# Patient Record
Sex: FEMALE | Race: WHITE | NOT HISPANIC OR LATINO | Employment: OTHER | URBAN - METROPOLITAN AREA
[De-identification: names, ages, dates, MRNs, and addresses within clinical notes are randomized per-mention and may not be internally consistent; named-entity substitution may affect disease eponyms.]

---

## 2017-01-16 DIAGNOSIS — E03.4 HYPOTHYROIDISM DUE TO ACQUIRED ATROPHY OF THYROID: ICD-10-CM

## 2017-01-16 DIAGNOSIS — F42.9 OCD (OBSESSIVE COMPULSIVE DISORDER): Primary | ICD-10-CM

## 2017-01-16 NOTE — TELEPHONE ENCOUNTER
Clomipramine      Last Written Prescription Date:  11-28-16  Last Fill Quantity: 60,   # refills: 1  Last Office Visit with [facility], [facility] or [facility] prescribing provider: 11-28-16 with Dr. Agarwal  Future Office visit:       Routing refill request to provider for review/approval because:  Drug not on the [facility], [facility] or [facility] refill protocol or controlled substance    Levothyroxine     Last Written Prescription Date: unknow  Last Quantity: 0, # refills: 0  Last Office Visit with [facility], [facility] or [facility] prescribing provider: 11-28-16 with        TSH   Date Value Ref Range Status   11/08/2016 2.21 0.40 - 4.00 mU/L Final       Nina Buck [facility]

## 2017-01-17 RX ORDER — CLOMIPRAMINE HYDROCHLORIDE 50 MG/1
50 CAPSULE ORAL 2 TIMES DAILY
Qty: 60 CAPSULE | Refills: 1 | Status: CANCELLED
Start: 2017-01-17

## 2017-01-17 RX ORDER — LEVOTHYROXINE SODIUM 25 UG/1
75 TABLET ORAL AT BEDTIME
Qty: 30 TABLET | Status: CANCELLED
Start: 2017-01-17

## 2017-01-17 NOTE — TELEPHONE ENCOUNTER
Routing refill request to provider for review/approval because:  Clomipramine not on the [facility] refill protocol and levothyroxine is reported historical  Vamsi Zhao RN

## 2017-01-17 NOTE — TELEPHONE ENCOUNTER
Can you please ask why she takes 3, 25 mcg synthroid instead of 1 75 mcg tablet? It is fine to refill the anafranil and the thyroid med. She needs labs unless she is seeing the psychiatrist and they are doing them. Cbc and cmp. Rima Agarwal M.D.

## 2017-01-18 RX ORDER — CLOMIPRAMINE HYDROCHLORIDE 50 MG/1
50 CAPSULE ORAL 2 TIMES DAILY
Qty: 180 CAPSULE | Refills: 1 | Status: SHIPPED | OUTPATIENT
Start: 2017-01-18 | End: 2017-02-10

## 2017-01-18 RX ORDER — LEVOTHYROXINE SODIUM 75 UG/1
75 TABLET ORAL DAILY
Qty: 90 TABLET | Refills: 1 | Status: SHIPPED | OUTPATIENT
Start: 2017-01-18 | End: 2017-06-01 | Stop reason: DRUGHIGH

## 2017-01-18 NOTE — TELEPHONE ENCOUNTER
"Patient said that she does not need refills for a month. She requests that 90 day be sent to [facility]. Refills ordered to [facility]. She said that she is feeling way better. \"It is like a miracle . I have not needed to take anything for my anxiety.\"  Vamsi Zhao RN    "

## 2017-02-10 DIAGNOSIS — F42.9 OCD (OBSESSIVE COMPULSIVE DISORDER): Primary | ICD-10-CM

## 2017-02-10 NOTE — TELEPHONE ENCOUNTER
Clomipramine 50mg      Last Written Prescription Date:  1/18/17  Last Fill Quantity: 180,   # refills: 1  Last Office Visit with [facility], [facility] or  [facility] prescribing provider: 11/28/16  Future Office visit:       Routing refill request to provider for review/approval because:  Drug not on the [facility], [facility] or  [facility] refill protocol or controlled substance

## 2017-02-13 RX ORDER — CLOMIPRAMINE HYDROCHLORIDE 50 MG/1
50 CAPSULE ORAL 2 TIMES DAILY
Qty: 180 CAPSULE | Refills: 1 | Status: SHIPPED | OUTPATIENT
Start: 2017-02-13 | End: 2017-06-06 | Stop reason: DRUGHIGH

## 2017-04-19 DIAGNOSIS — F42.9 OCD (OBSESSIVE COMPULSIVE DISORDER): ICD-10-CM

## 2017-04-19 NOTE — TELEPHONE ENCOUNTER
CLOMIPRAMINE 50MG CAP        Last Written Prescription Date:  2/13/17  Last Fill Quantity: 180,   # refills: 1  Last Office Visit with [facility] or [facility] prescribing provider: 11/28/16  Future Office visit:       Routing refill request to provider for review/approval because:  Drug not on the [facility], [facility] or  [facility] refill protocol or controlled substance

## 2017-04-19 NOTE — TELEPHONE ENCOUNTER
Please call patient /. I have not seen her in a long time and she needs to be seen regularly while on this medication. Rima Agarwal M.D.

## 2017-05-16 RX ORDER — CLOMIPRAMINE HYDROCHLORIDE 50 MG/1
CAPSULE ORAL
Qty: 180 CAPSULE | OUTPATIENT
Start: 2017-05-16

## 2017-05-30 ENCOUNTER — OFFICE VISIT (OUTPATIENT)
Dept: FAMILY MEDICINE | Facility: CLINIC | Age: 67
End: 2017-05-30
Payer: MEDICARE

## 2017-05-30 VITALS
SYSTOLIC BLOOD PRESSURE: 118 MMHG | DIASTOLIC BLOOD PRESSURE: 80 MMHG | WEIGHT: 174 LBS | HEIGHT: 65 IN | BODY MASS INDEX: 28.99 KG/M2 | HEART RATE: 72 BPM | TEMPERATURE: 97.7 F

## 2017-05-30 DIAGNOSIS — Z51.81 ENCOUNTER FOR THERAPEUTIC DRUG MONITORING: Primary | ICD-10-CM

## 2017-05-30 DIAGNOSIS — E03.4 HYPOTHYROIDISM DUE TO ACQUIRED ATROPHY OF THYROID: ICD-10-CM

## 2017-05-30 DIAGNOSIS — K21.9 GASTROESOPHAGEAL REFLUX DISEASE WITHOUT ESOPHAGITIS: ICD-10-CM

## 2017-05-30 DIAGNOSIS — F33.41 MAJOR DEPRESSIVE DISORDER, RECURRENT EPISODE, IN PARTIAL REMISSION (H): ICD-10-CM

## 2017-05-30 DIAGNOSIS — F42.9 OCD (OBSESSIVE COMPULSIVE DISORDER): ICD-10-CM

## 2017-05-30 LAB
ALBUMIN SERPL-MCNC: 3.5 G/DL (ref 3.4–5)
ALP SERPL-CCNC: 71 U/L (ref 40–150)
ALT SERPL W P-5'-P-CCNC: 29 U/L (ref 0–50)
ANION GAP SERPL CALCULATED.3IONS-SCNC: 6 MMOL/L (ref 3–14)
AST SERPL W P-5'-P-CCNC: 27 U/L (ref 0–45)
BILIRUB SERPL-MCNC: 0.2 MG/DL (ref 0.2–1.3)
BUN SERPL-MCNC: 13 MG/DL (ref 7–30)
CALCIUM SERPL-MCNC: 9 MG/DL (ref 8.5–10.1)
CHLORIDE SERPL-SCNC: 99 MMOL/L (ref 94–109)
CO2 SERPL-SCNC: 30 MMOL/L (ref 20–32)
CREAT SERPL-MCNC: 0.67 MG/DL (ref 0.52–1.04)
GFR SERPL CREATININE-BSD FRML MDRD: 88 ML/MIN/1.7M2
GLUCOSE SERPL-MCNC: 83 MG/DL (ref 70–99)
POTASSIUM SERPL-SCNC: 4 MMOL/L (ref 3.4–5.3)
PROT SERPL-MCNC: 7.2 G/DL (ref 6.8–8.8)
SODIUM SERPL-SCNC: 135 MMOL/L (ref 133–144)
T4 FREE SERPL-MCNC: 0.86 NG/DL (ref 0.76–1.46)
TSH SERPL DL<=0.005 MIU/L-ACNC: 4.16 MU/L (ref 0.4–4)

## 2017-05-30 PROCEDURE — 36415 COLL VENOUS BLD VENIPUNCTURE: CPT | Performed by: FAMILY MEDICINE

## 2017-05-30 PROCEDURE — 84443 ASSAY THYROID STIM HORMONE: CPT | Performed by: FAMILY MEDICINE

## 2017-05-30 PROCEDURE — 99214 OFFICE O/P EST MOD 30 MIN: CPT | Performed by: FAMILY MEDICINE

## 2017-05-30 PROCEDURE — 84439 ASSAY OF FREE THYROXINE: CPT | Performed by: FAMILY MEDICINE

## 2017-05-30 PROCEDURE — 80053 COMPREHEN METABOLIC PANEL: CPT | Performed by: FAMILY MEDICINE

## 2017-05-30 PROCEDURE — 93000 ELECTROCARDIOGRAM COMPLETE: CPT | Performed by: FAMILY MEDICINE

## 2017-05-30 RX ORDER — CLOMIPRAMINE HYDROCHLORIDE 25 MG/1
75 CAPSULE ORAL AT BEDTIME
Qty: 90 CAPSULE | Refills: 1 | Status: SHIPPED | OUTPATIENT
Start: 2017-05-30 | End: 2017-06-06 | Stop reason: ALTCHOICE

## 2017-05-30 NOTE — MR AVS SNAPSHOT
After Visit Summary   5/30/2017    Ellie Lewis    MRN: [id number]           Patient Information     Date Of Birth          1950        Visit Information        Provider Department      5/30/2017 3:00 PM Rima Agarwal MD [facility]        Today's Diagnoses     Encounter for therapeutic drug monitoring    -  1    OCD (obsessive compulsive disorder)        Major depressive disorder, recurrent episode, in partial remission (H)        Hypothyroidism due to acquired atrophy of thyroid        Gastroesophageal reflux disease without esophagitis          Care Instructions    Take the omeprazole daily 30-60 minutes before a meal for 4 weeks. After this take zantac 150 mg 2 times per day for 2-4 weeks then decrease to zantac 150 mg daily   If you are not improving over the next 3-4 weeks please let me know and we can order further testing.  Make the appointment with the psychiatric provider   Try decreasing to 75 mg of the clomipramine            Follow-ups after your visit        Additional Services     MENTAL HEALTH REFERRAL       Your provider has referred you to: [facility]: [facility] - [facility]  [phone]   [email or web address]   *Referral from [facility] Primary Care Provider required - Consultation Model - medication management & future refills will be returned to [facility] PCP upon completion of evaluation  *Please call to schedule an appointment.    All scheduling is subject to the client's specific insurance plan & benefits, provider/location availability, and provider clinical specialities.  Please arrive 15 minutes early for your first appointment and bring your completed paperwork.    Please be aware that coverage of these services is subject to the terms and limitations of your health insurance plan.  Call member services at your health plan with any benefit or coverage questions.          "         Who to contact     Normal or non-critical lab and imaging results will be communicated to you by MyChart, letter or phone within 4 business days after the clinic has received the results. If you do not hear from us within 7 days, please contact the clinic through [facility] or phone. If you have a critical or abnormal lab result, we will notify you by phone as soon as possible.  Submit refill requests through [employer] or call your pharmacy and they will forward the refill request to us. Please allow 3 business days for your refill to be completed.          If you need to speak with a  for additional information , please call: [phone]             Additional Information About Your Visit        [facility] Information     [facility] lets you send messages to your doctor, view your test results, renew your prescriptions, schedule appointments and more. To sign up, go to [email or web address] . Click on \"Log in\" on the left side of the screen, which will take you to the Welcome page. Then click on \"Sign up Now\" on the right side of the page.     You will be asked to enter the access code listed below, as well as some personal information. Please follow the directions to create your username and password.     Your access code is: [email or web address]  Expires: 2017  3:51 PM     Your access code will  in 90 days. If you need help or a new code, please call your [place] clinic or [phone].        Care EveryWhere ID     This is your Care EveryWhere ID. This could be used by other organizations to access your [place] medical records  [id number]        Your Vitals Were     Pulse Temperature Height BMI (Body Mass Index)          72 97.7  F (36.5  C) (Tympanic) 5' 5\" (1.651 m) 28.96 kg/m2         Blood Pressure from Last 3 Encounters:   17 118/80   16 99/64   16 121/73    Weight from Last 3 Encounters:   17 174 lb (78.9 kg)   16 143 lb (64.9 kg)   16 134 lb " (60.8 kg)              We Performed the Following     Comprehensive metabolic panel     EKG 12-lead complete w/read - Clinics     MENTAL HEALTH REFERRAL     TSH with free T4 reflex          Today's Medication Changes          These changes are accurate as of: 5/30/17  3:54 PM.  If you have any questions, ask your nurse or doctor.               Start taking these medicines.        Dose/Directions    omeprazole 20 MG CR capsule   Commonly known as:  priLOSEC   Used for:  Gastroesophageal reflux disease without esophagitis        Dose:  20 mg   Take 1 capsule (20 mg) by mouth daily   Quantity:  30 capsule   Refills:  1         These medicines have changed or have updated prescriptions.        Dose/Directions    * clomiPRAMINE 50 MG capsule   Commonly known as:  ANAFRANIL   This may have changed:  Another medication with the same name was added. Make sure you understand how and when to take each.   Used for:  OCD (obsessive compulsive disorder)        Dose:  50 mg   Take 1 capsule (50 mg) by mouth 2 times daily   Quantity:  180 capsule   Refills:  1       * clomiPRAMINE 25 MG capsule   Commonly known as:  ANAFRANIL   This may have changed:  You were already taking a medication with the same name, and this prescription was added. Make sure you understand how and when to take each.   Used for:  OCD (obsessive compulsive disorder), Major depressive disorder, recurrent episode, in partial remission (H)        Dose:  75 mg   Take 3 capsules (75 mg) by mouth At Bedtime   Quantity:  90 capsule   Refills:  1       * Notice:  This list has 2 medication(s) that are the same as other medications prescribed for you. Read the directions carefully, and ask your doctor or other care provider to review them with you.         Where to get your medicines      These medications were sent to [facility] [address] [place], [place] [address] [facility] OF [address] [place] [address]  [address] [facility] [address]      Phone:  [phone]     clomiPRAMINE 25 MG capsule    omeprazole 20 MG CR capsule                Primary Care Provider Office Phone # Fax #    Rima Agarwal [phone]       [facility] [address] [facility] [id number]        Thank you!     Thank you for choosing [facility]  for your care. Our goal is always to provide you with excellent care. Hearing back from our patients is one way we can continue to improve our services. Please take a few minutes to complete the written survey that you may receive in the mail after your visit with us. Thank you!             Your Updated Medication List - Protect others around you: Learn how to safely use, store and throw away your medicines at www.disposemymeds.org.          This list is accurate as of: 5/30/17  3:54 PM.  Always use your most recent med list.                   Brand Name Dispense Instructions for use    calcium carb 1250 mg (500 mg Pala)/vitamin D 200 units 500-200 MG-UNIT per tablet    OSCAL with D     Take 1 tablet by mouth daily       * clomiPRAMINE 50 MG capsule    ANAFRANIL    180 capsule    Take 1 capsule (50 mg) by mouth 2 times daily       * clomiPRAMINE 25 MG capsule    ANAFRANIL    90 capsule    Take 3 capsules (75 mg) by mouth At Bedtime       levothyroxine 75 MCG tablet    SYNTHROID/LEVOTHROID    90 tablet    Take 1 tablet (75 mcg) by mouth daily       omeprazole 20 MG CR capsule    priLOSEC    30 capsule    Take 1 capsule (20 mg) by mouth daily       VITAMIN C PO      Take 500 mg by mouth daily       * Notice:  This list has 2 medication(s) that are the same as other medications prescribed for you. Read the directions carefully, and ask your doctor or other care provider to review them with you.

## 2017-05-30 NOTE — PROGRESS NOTES
SUBJECTIVE:                                                    Ellie Lewis is a 67 year old female who presents to clinic today for the following health issues:    Medication Followup of Clomipramine    Taking Medication as prescribed: yes    Side Effects:  Weight gain, ringing in ears, fatigue     Medication Helping Symptoms:  Yes, they have been, feeling some depression coming back a little.        Problem list and histories reviewed & adjusted, as indicated.  Additional history: she is here for medication monitoriung  She also has been having some trouble with swallowing foods  The capsules sometimes feels like they are getting stuck dry foods , not water   Has had some acid reflux she is not really taking anything for this it has been getting more consistent recently .   She does not eat late at night and has been avoiding spicy foods and those that bother it  As for her mood, the ocd is good but her depression is a bit worse she has slight decrease in interest in doing things, sleep is good, she is less positive in her outlook , is more irritable, she is very frustrated but the excessive weight gain [place] the clomipramine has caused,   This was not to be a long term med.   We discussed that she was to be seeing psychiatry for management of this but she had not ever made a follow up appointment.   At this point she really needs to f/u with psychiatry   She feels like she has some ringing in the ears at times not constant it has been there a while.   No hearing loss       Patient Active Problem List   Diagnosis     Hypothyroidism due to acquired atrophy of thyroid     Recurrent major depression resistant to treatment (H)     OCD (obsessive compulsive disorder)     Depression     Status post total replacement of right hip     No past surgical history on file.    Social History   Substance Use Topics     Smoking status: Never Smoker     Smokeless tobacco: Not on file     Alcohol use No     No family history on  "file.      Wt Readings from Last 5 Encounters:   05/30/17 174 lb (78.9 kg)   11/28/16 143 lb (64.9 kg)   11/13/16 134 lb (60.8 kg)   10/20/16 137 lb (62.1 kg)   09/01/16 137 lb (62.1 kg)       ROS:  Constitutional, HEENT, cardiovascular, pulmonary, GI, , musculoskeletal, neuro, skin, endocrine and psych systems are negative, except as otherwise noted.    OBJECTIVE:                                                    /80 (BP Location: Right arm, Patient Position: Chair, Cuff Size: Adult Regular)  Pulse 72  Temp 97.7  F (36.5  C) (Tympanic)  Ht 5' 5\" (1.651 m)  Wt 174 lb (78.9 kg)  BMI 28.96 kg/m2 Body mass index is 28.96 kg/(m^2).   GENERAL APPEARANCE: healthy, alert and no distress  HENT: ear canals and TM's normal and nose and mouth without ulcers or lesions hearing grossly normal to finger rub  NECK: no adenopathy, no asymmetry, masses, or scars and thyroid normal to palpation  RESP: lungs clear to auscultation - no rales, rhonchi or wheezes  CV: regular rates and rhythm, normal S1 S2, no S3 or S4 and no murmur, click or rub  ABDOMEN: soft, nontender, without hepatosplenomegaly or masses and bowel sounds normal  ekg with normal qtc  MENTAL STATUS EXAM:    1. Clinical observations: Ellie was clean and well-groomed and was well groomed. Ellie's emotional presentation was open and cooperative. She spoke clear and articulate. She maintained good eye contact and she was cooperative in answering questions.   2. She appeared to be well-oriented in all spheres with coherent, logical, goal directed and relevent thinking.   3. Thought content: She denies no abnormal thought process.   4. Affect and mood: Skys affect is described as normal/appropriate and her emotional attitude was open and cooperative. She reports the following sypmtoms: sad feeling or depressed, gain of weight, lack of interest or enjoyment, feeling negative about the future, less energy than usual, feeling fatiqued and fear of becoming " fat.    5. Sensorium and cognition: She was in contact with reality and oriented to time, place and person.  She demonstrated no impairment in immediate, recent, or remote memory. Her insight was adequate and her  intelligence appeared to be average.         ASSESSMENT/PLAN:                                                    1. Encounter for therapeutic drug monitoring  Has been taking the clomipramine for the last 6 + months she had not followed up before she has gained a lot of weight. Will started weaning her slowly off med and refer to psychiatry.   - EKG 12-lead complete w/read - Clinics  - Comprehensive metabolic panel  - T4 free    2. OCD (obsessive compulsive disorder)    - clomiPRAMINE (ANAFRANIL) 25 MG capsule; Take 3 capsules (75 mg) by mouth At Bedtime  Dispense: 90 capsule; Refill: 1  - Comprehensive metabolic panel  - MENTAL HEALTH REFERRAL    3. Major depressive disorder, recurrent episode, in partial remission (H)  Having an increase in depressive symptoms. I discussed with Ellie that I am not comfortable prescribing medications with the clomipramine and that I would like her to see psych. I would however like to check her thyroid and would be willing to augment her clomipramine with thyroid after testing   - clomiPRAMINE (ANAFRANIL) 25 MG capsule; Take 3 capsules (75 mg) by mouth At Bedtime  Dispense: 90 capsule; Refill: 1    4. Hypothyroidism due to acquired atrophy of thyroid  Low today will increase her dosing. Recheck 6 weeks this may also help with her mood.   - TSH with free T4 reflex  - levothyroxine (SYNTHROID/LEVOTHROID) 100 MCG tablet; Take 1 tablet (100 mcg) by mouth daily  Dispense: 90 tablet; Refill: 1    5. Gastroesophageal reflux disease without esophagitis    - omeprazole (PRILOSEC) 20 MG CR capsule; Take 1 capsule (20 mg) by mouth daily  Dispense: 30 capsule; Refill: 1  Patient Instructions   Take the omeprazole daily 30-60 minutes before a meal for 4 weeks. After this take zantac  150 mg 2 times per day for 2-4 weeks then decrease to zantac 150 mg daily   If you are not improving over the next 3-4 weeks please let me know and we can order further testing.  Make the appointment with the psychiatric provider   Try decreasing to 75 mg of the clomipramine      '     reports that she has never smoked. She does not have any smokeless tobacco history on file.      Weight management plan: Discussed healthy diet and exercise guidelines and patient will follow up in 3 months in clinic to re-evaluate.    Rima Agarwal M.D.  [facility]

## 2017-05-30 NOTE — PATIENT INSTRUCTIONS
Take the omeprazole daily 30-60 minutes before a meal for 4 weeks. After this take zantac 150 mg 2 times per day for 2-4 weeks then decrease to zantac 150 mg daily   If you are not improving over the next 3-4 weeks please let me know and we can order further testing.  Make the appointment with the psychiatric provider   Try decreasing to 75 mg of the clomipramine

## 2017-06-01 RX ORDER — LEVOTHYROXINE SODIUM 100 UG/1
100 TABLET ORAL DAILY
Qty: 90 TABLET | Refills: 1 | Status: SHIPPED | OUTPATIENT
Start: 2017-06-01 | End: 2017-08-07

## 2017-06-03 ENCOUNTER — NURSE TRIAGE (OUTPATIENT)
Dept: NURSING | Facility: CLINIC | Age: 67
End: 2017-06-03

## 2017-06-03 NOTE — TELEPHONE ENCOUNTER
"Patient calling to confirm her Synthroid instructions. Reviewed EPIC notes from recent visit. Patient was prescribed new dose of 100mcg daily of Synthroid and  and advised to stop the previous dose   ( 75mcg)  Patient verbalizes understanding.   Yoly Duarte RN   [facility]    Additional Information    Negative: Drug overdose and nurse unable to answer question    Negative: Caller requesting information not related to medicine    Negative: Caller requesting a prescription for Strep throat and has a positive culture result    Negative: Rash while taking a medication or within 3 days of stopping it    Negative: Immunization reaction suspected    Negative: [1] Asthma and [2] having symptoms of asthma (cough, wheezing, etc)    Negative: MORE THAN A DOUBLE DOSE of a prescription or over-the-counter (OTC) drug    Negative: [1] DOUBLE DOSE (an extra dose or lesser amount) of over-the-counter (OTC) drug AND [2] any symptoms (e.g., dizziness, nausea, pain, sleepiness)    Negative: [1] DOUBLE DOSE (an extra dose or lesser amount) of prescription drug AND [2] any symptoms (e.g., dizziness, nausea, pain, sleepiness)    Negative: Took another person's prescription drug    Negative: [1] DOUBLE DOSE (an extra dose or lesser amount) of prescription drug AND [2] NO symptoms (Exception: a double dose of antibiotics)    Negative: Diabetes drug error or overdose (e.g., insulin or extra dose)    Negative: [1] Request for URGENT new prescription or refill of \"essential\" medication (i.e., likelihood of harm to patient if not taken) AND [2] triager unable to fill per unit policy    Negative: [1] Prescription not at pharmacy AND [2] was prescribed today by PCP    Negative: Pharmacy calling with prescription questions and triager unable to answer question    Negative: Caller has URGENT medication question about med that PCP prescribed and triager unable to answer question    Negative: Caller has NON-URGENT medication question " about med that PCP prescribed and triager unable to answer question    Negative: Caller requesting a NON-URGENT new prescription or refill and triager unable to refill per unit policy    Negative: Caller has medication question about med not prescribed by PCP and triager unable to answer question (e.g., compatibility with other med, storage)    Negative: [1] DOUBLE DOSE (an extra dose or lesser amount) of over-the-counter (OTC) drug AND [2] NO symptoms (all triage questions negative)    Negative: [1] DOUBLE DOSE (an extra dose or lesser amount) of antibiotic drug AND [2] NO symptoms (all triage questions negative)    Caller has medication question only, adult not sick, and triager answers question    Protocols used: [facility]ST[facility]

## 2017-06-06 ENCOUNTER — OFFICE VISIT (OUTPATIENT)
Dept: PSYCHIATRY | Facility: CLINIC | Age: 67
End: 2017-06-06
Attending: FAMILY MEDICINE
Payer: MEDICARE

## 2017-06-06 ENCOUNTER — TELEPHONE (OUTPATIENT)
Dept: FAMILY MEDICINE | Facility: CLINIC | Age: 67
End: 2017-06-06

## 2017-06-06 VITALS
SYSTOLIC BLOOD PRESSURE: 113 MMHG | DIASTOLIC BLOOD PRESSURE: 76 MMHG | HEART RATE: 76 BPM | TEMPERATURE: 96.8 F | WEIGHT: 173 LBS | BODY MASS INDEX: 28.79 KG/M2

## 2017-06-06 DIAGNOSIS — F42.9 OCD (OBSESSIVE COMPULSIVE DISORDER): Primary | ICD-10-CM

## 2017-06-06 DIAGNOSIS — E03.4 HYPOTHYROIDISM DUE TO ACQUIRED ATROPHY OF THYROID: Primary | ICD-10-CM

## 2017-06-06 PROCEDURE — 90792 PSYCH DIAG EVAL W/MED SRVCS: CPT | Performed by: CLINICAL NURSE SPECIALIST

## 2017-06-06 ASSESSMENT — ANXIETY QUESTIONNAIRES
1. FEELING NERVOUS, ANXIOUS, OR ON EDGE: SEVERAL DAYS
IF YOU CHECKED OFF ANY PROBLEMS ON THIS QUESTIONNAIRE, HOW DIFFICULT HAVE THESE PROBLEMS MADE IT FOR YOU TO DO YOUR WORK, TAKE CARE OF THINGS AT HOME, OR GET ALONG WITH OTHER PEOPLE: SOMEWHAT DIFFICULT
4. TROUBLE RELAXING: SEVERAL DAYS
5. BEING SO RESTLESS THAT IT IS HARD TO SIT STILL: NOT AT ALL
2. NOT BEING ABLE TO STOP OR CONTROL WORRYING: SEVERAL DAYS
7. FEELING AFRAID AS IF SOMETHING AWFUL MIGHT HAPPEN: NOT AT ALL
3. WORRYING TOO MUCH ABOUT DIFFERENT THINGS: SEVERAL DAYS
GAD7 TOTAL SCORE: 5
6. BECOMING EASILY ANNOYED OR IRRITABLE: SEVERAL DAYS

## 2017-06-06 NOTE — PATIENT INSTRUCTIONS
Treatment Plan:    Begin taper to discontinue the clomipramine (Anafranil) by reducing by 25 mg every three days until discontinued.     When taking clomipramine 25 mg daily, start fluoxetine (Prozac) 20 mg daily.     Follow up in 3 weeks.     Continue individual therapy.     Begin Vitamin D 1000 IU per day.     - Recommend patient discuss medications with their pharmacist. Risks and benefits of medications discussed, including side effect profile.   - Safety plan was reviewed; to the ER as needed or call after hours crisis line; [phone]  - Education and counseling was done regarding use of medications, psychotherapy options  - Call [phone] for appointment or to speak to a nurse.   -Office hours: Monday through Thursday 8:00 am to 4:30 pm; Friday 8:00 am to Noon.   - Patient was given a copy of this Treatment Plan today.

## 2017-06-06 NOTE — PROGRESS NOTES
"                                                         Outpatient Psychiatric Evaluation-Standard    Name: Ellie Lewis  : 1950  Date: 2017    Source of Referral:  Primary Care Physician: Rima Agarwal  Current Psychotherapist: patient cannot recall therapist's name, meeting once per month.     Identifying Data:  Patient is a 67 year old,  female who presents for initial visit with me.  Patient is currently retired, . Patient attended the session alone.   60 minutes were spent on evaluation with 40 minutes CC time.    HPI:  Patient reports OCD symptoms started at age 23 and was diagnosed at age 29. Patient took Luvox for many years which was very effective. Patient reports in  \"everything started going haywire\" because the Luvox stopped working or there was a different  of the Luvox.Patient has not been able to identify previous  even though she has contacted the pharmacy.  Patient reports one day she suddenly felt a flush which started the \"feeling terrible\". Patient was prescribed clomipramine (Anafranil) while hospitalized for OCD in . Patient reports the Anafranil is helpful, but patient has gained 40 pounds. Patient reports she tried a healthy diet and exercise to manage which was ineffective.     Patient reports the OCD started with fear getting of cancer from another person.  If she knew someone who was being treated for cancer or saw someone who may be getting chemotherapy, she would leave the area. Patient reports she would then go home and start excessively cleaning and washing surfaces and clothing. Patient reports she is now focused on leukemia, such as at [facility] if there is an announcement that someone has leukemia or sees someone in public places who may be getting treatment for leukemia, stating \"I just have to stay away\".     We discussed medication options to include continuing with the clomipramine and try to " "manage the weight issues through diet and exercise, rechallenge the Luvox, or try fluoxetine (Prozac) which has indication for OCD as well. After discussing risks and benefits, patient would like to proceed with a trial of fluoxetine (Prozac).     Psychiatric Review of Symptoms:  Depression: Sleep: Increase  Appetite: Increase  Depressed Mood Energy: Decrease    Last PHQ-9 score = No Value exists for the : HP#PHQ9; 9  [place]olis:  No symptoms  Mood Disorder Questionnaire:     Anxiety: Feeling nervous or on edge  Uncontrolled worrying  Trouble relaxing  Easily annoyed or irritable    GAD7 score: 5  Panic:  No symptoms  Agoraphobia:  No  OCD:  cancer worries which has now focused on leukemia which patient thinks she could sprend it to others  Psychosis: No symptoms  ADD / ADHD: No symptoms  Gambling or shoplifting: No  Eating Disorder:  No symptoms  Suicide attempts:  No  Current SI risk:  No patient denies plan or intent. Yarsani beliefs \"I wouldn't do it because I don't want to go to hel. That's scary\". Family.     Significant Losses / Trauma / Abuse / Neglect Issues:  There are no indications or report of: significant losses, trauma, abuse or neglect.    PTSD:  No symptoms    Issues of possible neglect are not present.    A safety and risk management plan has not been developed at this time, however client was given the after-hours number / 911 should there be a change in any of these risk factors.      Psychiatric History:   Hospitalizations: [facility] 2016, due to OCD and problems with medication  Past psychotherapy: counseling and medication(s) from physician / PCP    Past medication trials: (patient was presented with a list to review all currently available antidepressants, mood stabilizers, tranquilizers, hypnotics and antipsychotics)  Older Antidepressants: Anafranil (clomipramine)  New Antidepressants:  Effexor (venlafaxine), Lexapro (escitalopram), Luvox (fluvoxamine) and Zoloft " "(sertraline)  Mood Stabilizers:  Lithobid/Eskalith/Lithium Carbonate   Tranquilizers:  Ativan (lorazepam)      Chemical Use History:  Patient has not received chemical dependency treatment in the past.  Patient reports no problems as a result of their drinking / drug use.  Current use of drugs or alcohol: N/A  CAGE: None of the patient's responses to the CAGE screening were positive / Negative CAGE score   Based on the negative Cage-Aid score and clinical interview there  are not indications of drug or alcohol abuse.  Tobacco use: No  Ready to quit?  No  NRT tried: NA    Past Medical History:  Surgery: History reviewed. No pertinent surgical history.  Allergies:    Allergies   Allergen Reactions     Sulfa Drugs Rash     Primary MD: Rima Agarwal  Seizures or head injury: No  Diet: \"Normal\"  Exercise: moderate regular exercise program which includes walking 1-2 times per week, weight training  Supplements: none    Current Medications:  Current Outpatient Prescriptions   Medication Sig     levothyroxine (SYNTHROID/LEVOTHROID) 100 MCG tablet Take 1 tablet (100 mcg) by mouth daily     clomiPRAMINE (ANAFRANIL) 25 MG capsule Take 3 capsules (75 mg) by mouth At Bedtime     omeprazole (PRILOSEC) 20 MG CR capsule Take 1 capsule (20 mg) by mouth daily     [DISCONTINUED] clomiPRAMINE (ANAFRANIL) 50 MG capsule Take 1 capsule (50 mg) by mouth 2 times daily (Patient not taking: Reported on 6/6/2017)     calcium carb 1250 mg, 500 mg [place],/vitamin D 200 units (OSCAL WITH D) 500-200 MG-UNIT per tablet Take 1 tablet by mouth daily     Ascorbic Acid (VITAMIN C PO) Take 500 mg by mouth daily      No current facility-administered medications for this visit.        Vital Signs:  /76 (BP Location: Left arm, Patient Position: Chair, Cuff Size: Adult Regular)  Pulse 76  Temp 96.8  F (36  C) (Tympanic)  Wt 173 lb (78.5 kg)  BMI 28.79 kg/m2      Review of Systems:  (constitutional, HEENT, Neuro, Cardiac, Pulmonary, GI, , Heme / " "Lymph, Endocrine, Skin / Breast, MSK reviewed)  10 point ROS was negative except for the following: GERD    Family History:   (with focus on psychiatric and substance abuse)  Chemical use problems None  Mental health history: Mother and son - Anxiety  Patient reports family history is not on file.    Social History:   Patient grew up in [place], [place]    Siblings: 2 brothers  Intact family growing up?; Yes  Highest education level was college graduate.   Marital status and living situation: Lives with   three children.   she has not been involved with the legal system.      Mental Status Assessment:     Appearance:  Well groomed      Behavior/relationship to examiner/demeanor:  Cooperative, engaged and pleasant  Motor activity:  Normal  Gait:  Normal   Speech:  Normal in volume, articulation, coherence   Mood (subjective report):  \"Okay\"  Affect (objective appearance):  Mood congruent  Thought Process (Associations):  Logical, linear and goal directed  Thought content:  No evidence of suicidal or homicidal ideation,          no overt psychosis and                    patient does not appear to be responding to internal stimuli  Oriented to person, place, date/time   Attention Span and concentration: Intact   Memory:  Short-term memory intact and Long-term memory; Intact  Language:  Fluent   Fund of Knowledge/Intelligence:  Average  Use of language: Intact   Abstraction:  Normal  Insight:  Adequate  Judgment:  Adequate for safety    DSM5  Diagnosis:    300.3 (F42) Obsessive Compulsive Disorder  Psychosocial & Contextual Factors: recent move from [place]    Strengths and Liabilities:   Patient identified the following strengths or resources that will help her  succeed in counseling: Mosque, enrrique / spirituality, friends / good social support and family support.  Things that may interfere with the patient's success include:denies.    WHODAS 2.0 TOTAL SCORES 6/6/2017   Total Score 22         Impression:  Patient is " relatively stable in regards to OCD symptoms, although she does struggle with ongoing thoughts of acquiring leukemia from other people. Luvox was helpful for many years, but either stopped working or the  changed, the latter being the most likely. Patient has not been able to identify previous  even though she has contacted the pharmacy. Fluoxetine (Prozac) has indication for OCD as well and will try it.     Medication side effects and alternatives reviewed.     Treatment Plan:    Begin taper to discontinue the clomipramine (Anafranil) by reducing by 25 mg every three days until discontinued.     When taking clomipramine 25 mg daily, start fluoxetine (Prozac) 20 mg daily.     Follow up in 3 weeks.     Continue individual therapy.     Begin Vitamin D 1000 IU per day.     - Recommend patient discuss medications with their pharmacist. Risks and benefits of medications discussed, including side effect profile.   - Safety plan was reviewed; to the ER as needed or call after hours crisis line; [phone]  - Education and counseling was done regarding use of medications, psychotherapy options  - Call [phone] for appointment or to speak to a nurse.   -Office hours: Monday through Thursday 8:00 am to 4:30 pm; Friday 8:00 am to Noon.   - Patient was given a copy of this Treatment Plan today.     My Practice Policy was reviewed and signed: YES       Patient will continue to be seen for ongoing consultation and stabilization.      Signed: Nany Payne, RN, MS, APRN                 Psychiatry

## 2017-06-06 NOTE — MR AVS SNAPSHOT
After Visit Summary   6/6/2017    Ellie Lewis    MRN: [id number]           Patient Information     Date Of Birth          1950        Visit Information        Provider Department      6/6/2017 9:45 AM Nany Payne APRN [facility]        Today's Diagnoses     OCD (obsessive compulsive disorder)    -  1      Care Instructions    Treatment Plan:    Begin taper to discontinue the clomipramine (Anafranil) by reducing by 25 mg every three days until discontinued.     When taking clomipramine 25 mg daily, start fluoxetine (Prozac) 20 mg daily.     Follow up in 3 weeks.     Continue individual therapy.     Begin Vitamin D 1000 IU per day.     - Recommend patient discuss medications with their pharmacist. Risks and benefits of medications discussed, including side effect profile.   - Safety plan was reviewed; to the ER as needed or call after hours crisis line; [phone]  - Education and counseling was done regarding use of medications, psychotherapy options  - Call [phone] for appointment or to speak to a nurse.   -Office hours: Monday through Thursday 8:00 am to 4:30 pm; Friday 8:00 am to Noon.   - Patient was given a copy of this Treatment Plan today.           Follow-ups after your visit        Who to contact     If you have questions or need follow up information about today's clinic visit or your schedule please contact [facility] directly at [phone].  Normal or non-critical lab and imaging results will be communicated to you by MyChart, letter or phone within 4 business days after the clinic has received the results. If you do not hear from us within 7 days, please contact the clinic through "[employer]hart or phone. If you have a critical or abnormal lab result, we will notify you by phone as soon as possible.  Submit refill requests through [employer] or call your pharmacy and they will forward the refill request to us. Please allow 3 business  "days for your refill to be completed.          Additional Information About Your Visit        [employer] Information     [employer] lets you send messages to your doctor, view your test results, renew your prescriptions, schedule appointments and more. To sign up, go to [email or web address] . Click on \"Log in\" on the left side of the screen, which will take you to the Welcome page. Then click on \"Sign up Now\" on the right side of the page.     You will be asked to enter the access code listed below, as well as some personal information. Please follow the directions to create your username and password.     Your access code is: [email or web address]  Expires: 2017  3:51 PM     Your access code will  in 90 days. If you need help or a new code, please call your [place] clinic or [phone].        Care EveryWhere ID     This is your Care EveryWhere ID. This could be used by other organizations to access your [facility] medical records  [id number]        Your Vitals Were     Pulse Temperature BMI (Body Mass Index)             76 96.8  F (36  C) (Tympanic) 28.79 kg/m2          Blood Pressure from Last 3 Encounters:   17 113/76   17 118/80   16 99/64    Weight from Last 3 Encounters:   17 173 lb (78.5 kg)   17 174 lb (78.9 kg)   16 143 lb (64.9 kg)              Today, you had the following     No orders found for display         Today's Medication Changes          These changes are accurate as of: 17 11:00 AM.  If you have any questions, ask your nurse or doctor.               Start taking these medicines.        Dose/Directions    FLUoxetine 20 MG capsule   Commonly known as:  PROzac   Used for:  OCD (obsessive compulsive disorder)   Started by:  Nany Payne APRN CNS        Dose:  20 mg   Take 1 capsule (20 mg) by mouth daily   Quantity:  30 capsule   Refills:  1         Stop taking these medicines if you haven't already. Please contact your care team if you have " questions.     clomiPRAMINE 25 MG capsule   Commonly known as:  ANAFRANIL   Stopped by:  Nany Payne APRN CNS           clomiPRAMINE 50 MG capsule   Commonly known as:  ANAFRANIL   Stopped by:  Nany Payne APRN CNS                Where to get your medicines      These medications were sent to [id number] - [place], [place] - [address]  [address], [facility] [address]     Phone:  [phone]     FLUoxetine 20 MG capsule                Primary Care Provider Office Phone # Fax #    Rima Agarwal [phone]       [facility] [address] [facility] [address]        Thank you!     Thank you for choosing [facility]  for your care. Our goal is always to provide you with excellent care. Hearing back from our patients is one way we can continue to improve our services. Please take a few minutes to complete the written survey that you may receive in the mail after your visit with us. Thank you!             Your Updated Medication List - Protect others around you: Learn how to safely use, store and throw away your medicines at www.disposemymeds.org.          This list is accurate as of: 6/6/17 11:00 AM.  Always use your most recent med list.                   Brand Name Dispense Instructions for use    calcium carb 1250 mg (500 mg [place])/vitamin D 200 units 500-200 MG-UNIT per tablet    OSCAL with D     Take 1 tablet by mouth daily       FLUoxetine 20 MG capsule    PROzac    30 capsule    Take 1 capsule (20 mg) by mouth daily       levothyroxine 100 MCG tablet    SYNTHROID/LEVOTHROID    90 tablet    Take 1 tablet (100 mcg) by mouth daily       omeprazole 20 MG CR capsule    priLOSEC    30 capsule    Take 1 capsule (20 mg) by mouth daily       VITAMIN C PO      Take 500 mg by mouth daily

## 2017-06-07 ASSESSMENT — ANXIETY QUESTIONNAIRES: GAD7 TOTAL SCORE: 5

## 2017-06-07 ASSESSMENT — PATIENT HEALTH QUESTIONNAIRE - PHQ9: SUM OF ALL RESPONSES TO PHQ QUESTIONS 1-9: 9

## 2017-06-27 ENCOUNTER — OFFICE VISIT (OUTPATIENT)
Dept: PSYCHIATRY | Facility: CLINIC | Age: 67
End: 2017-06-27
Payer: MEDICARE

## 2017-06-27 VITALS
DIASTOLIC BLOOD PRESSURE: 77 MMHG | HEART RATE: 72 BPM | BODY MASS INDEX: 29.05 KG/M2 | TEMPERATURE: 96.1 F | SYSTOLIC BLOOD PRESSURE: 118 MMHG | WEIGHT: 174.6 LBS

## 2017-06-27 DIAGNOSIS — F42.8 OTHER OBSESSIVE-COMPULSIVE DISORDERS: Primary | ICD-10-CM

## 2017-06-27 PROCEDURE — 99214 OFFICE O/P EST MOD 30 MIN: CPT | Performed by: CLINICAL NURSE SPECIALIST

## 2017-06-27 RX ORDER — FLUOXETINE 10 MG/1
10 CAPSULE ORAL DAILY
Qty: 30 CAPSULE | Refills: 1 | Status: SHIPPED | OUTPATIENT
Start: 2017-06-27 | End: 2017-08-14

## 2017-06-27 ASSESSMENT — PATIENT HEALTH QUESTIONNAIRE - PHQ9: 5. POOR APPETITE OR OVEREATING: NOT AT ALL

## 2017-06-27 ASSESSMENT — ANXIETY QUESTIONNAIRES
6. BECOMING EASILY ANNOYED OR IRRITABLE: NOT AT ALL
7. FEELING AFRAID AS IF SOMETHING AWFUL MIGHT HAPPEN: NOT AT ALL
3. WORRYING TOO MUCH ABOUT DIFFERENT THINGS: SEVERAL DAYS
GAD7 TOTAL SCORE: 2
1. FEELING NERVOUS, ANXIOUS, OR ON EDGE: NOT AT ALL
IF YOU CHECKED OFF ANY PROBLEMS ON THIS QUESTIONNAIRE, HOW DIFFICULT HAVE THESE PROBLEMS MADE IT FOR YOU TO DO YOUR WORK, TAKE CARE OF THINGS AT HOME, OR GET ALONG WITH OTHER PEOPLE: SOMEWHAT DIFFICULT
5. BEING SO RESTLESS THAT IT IS HARD TO SIT STILL: NOT AT ALL
2. NOT BEING ABLE TO STOP OR CONTROL WORRYING: SEVERAL DAYS

## 2017-06-27 NOTE — MR AVS SNAPSHOT
After Visit Summary   6/27/2017    Ellie Lewis    MRN: [id number]           Patient Information     Date Of Birth          1950        Visit Information        Provider Department      6/27/2017 8:45 AM Nany Payne APRN [facility]        Today's Diagnoses     Other obsessive-compulsive disorders    -  1      Care Instructions    Treatment Plan:  Increase to fluoxetine (Prozac) 30 mg daily.     Schedule individual therapy.     Follow up one month.     - Recommend patient discuss medications with their pharmacist. Risks and benefits for medications were discussed including, but not limited to, side effects.   - Safety plan was reviewed; to the ER as needed or call after hours crisis line; [phone]  - Education and counseling was done regarding use of medications, psychotherapy options  - Call [phone] for appointment or to speak to a nurse.    -Office hours: Monday through Thursday 8:00 am to 4:30 pm; Friday 8:00 am to Noon.   - Patient received a copy of this Treatment Plan today.            Follow-ups after your visit        Who to contact     If you have questions or need follow up information about today's clinic visit or your schedule please contact [facility] directly at [phone].  Normal or non-critical lab and imaging results will be communicated to you by [employer], letter or phone within 4 business days after the clinic has received the results. If you do not hear from us within 7 days, please contact the clinic through [employer] or phone. If you have a critical or abnormal lab result, we will notify you by phone as soon as possible.  Submit refill requests through [email or web address] or call your pharmacy and they will forward the refill request to us. Please allow 3 business days for your refill to be completed.          Additional Information About Your Visit        [email or web address] Information     [email or web address] lets you send messages to your doctor, view  "your test results, renew your prescriptions, schedule appointments and more. To sign up, go to [email or web address] . Click on \"Log in\" on the left side of the screen, which will take you to the Welcome page. Then click on \"Sign up Now\" on the right side of the page.     You will be asked to enter the access code listed below, as well as some personal information. Please follow the directions to create your username and password.     Your access code is: [email or web address]  Expires: 2017  3:51 PM     Your access code will  in 90 days. If you need help or a new code, please call your [place] clinic or [phone].        Care EveryWhere ID     This is your Care EveryWhere ID. This could be used by other organizations to access your [place] medical records  [id number]        Your Vitals Were     Pulse Temperature BMI (Body Mass Index)             72 96.1  F (35.6  C) (Tympanic) 29.05 kg/m2          Blood Pressure from Last 3 Encounters:   17 118/77   17 113/76   17 118/80    Weight from Last 3 Encounters:   17 174 lb 9.6 oz (79.2 kg)   17 173 lb (78.5 kg)   17 174 lb (78.9 kg)              Today, you had the following     No orders found for display         Today's Medication Changes          These changes are accurate as of: 17  9:09 AM.  If you have any questions, ask your nurse or doctor.               These medicines have changed or have updated prescriptions.        Dose/Directions    * FLUoxetine 10 MG capsule   Commonly known as:  PROzac   This may have changed:    - medication strength  - how much to take  - additional instructions   Used for:  Other obsessive-compulsive disorders   Changed by:  Nany Payen APRN CNS        Dose:  10 mg   Take 1 capsule (10 mg) by mouth daily Take with 20 mg daily for total 30 mg daily dose.   Quantity:  30 capsule   Refills:  1       * FLUoxetine 20 MG capsule   Commonly known as:  PROzac   This may have " changed:  You were already taking a medication with the same name, and this prescription was added. Make sure you understand how and when to take each.   Used for:  Other obsessive-compulsive disorders   Changed by:  Nany Payne APRN CNS        Dose:  20 mg   Take 1 capsule (20 mg) by mouth daily Take with 10 mg daily for total 30 mg daily dose.   Quantity:  30 capsule   Refills:  1       * Notice:  This list has 2 medication(s) that are the same as other medications prescribed for you. Read the directions carefully, and ask your doctor or other care provider to review them with you.         Where to get your medicines      These medications were sent to [email or web address] [address] [address] AT [address]  [address] [facility] [address]     Phone:  [phone]     FLUoxetine 10 MG capsule    FLUoxetine 20 MG capsule                Primary Care Provider Office Phone # Fax #    Rima Agarwal [phone]       [facility] [address] [facility] [address]        Equal Access to Services     MAGGIE DE LEON AH: Hadii aad ku hadasho Soomaali, waaxda luqadaha, qaybta kaalmada adeegyacarlitos, waxdoretha mina . So [facility] [phone].    ATENCIÓN: Si habla español, tiene a terrazas disposición servicios gratuitos de asistencia lingüística. Llame al [phone].    We comply with applicable federal civil rights laws and [place] laws. We do not discriminate on the basis of race, color, national origin, age, disability sex, sexual orientation or gender identity.            Thank you!     Thank you for choosing [facility]  for your care. Our goal is always to provide you with excellent care. Hearing back from our patients is one way we can continue to improve our services. Please take a few minutes to complete the written survey that you may receive in the mail after your visit with us. Thank you!              Your Updated Medication List - Protect others around you: Learn how to safely use, store and throw away your medicines at www.disposemymeds.org.          This list is accurate as of: 6/27/17  9:09 AM.  Always use your most recent med list.                   Brand Name Dispense Instructions for use Diagnosis    calcium carb 1250 mg (500 mg [place])/vitamin D 200 units 500-200 MG-UNIT per tablet    OSCAL with D     Take 1 tablet by mouth daily        * FLUoxetine 10 MG capsule    PROzac    30 capsule    Take 1 capsule (10 mg) by mouth daily Take with 20 mg daily for total 30 mg daily dose.    Other obsessive-compulsive disorders       * FLUoxetine 20 MG capsule    PROzac    30 capsule    Take 1 capsule (20 mg) by mouth daily Take with 10 mg daily for total 30 mg daily dose.    Other obsessive-compulsive disorders       levothyroxine 100 MCG tablet    SYNTHROID/LEVOTHROID    90 tablet    Take 1 tablet (100 mcg) by mouth daily    Hypothyroidism due to acquired atrophy of thyroid       omeprazole 20 MG CR capsule    priLOSEC    30 capsule    Take 1 capsule (20 mg) by mouth daily    Gastroesophageal reflux disease without esophagitis       VITAMIN C PO      Take 500 mg by mouth daily    Recurrent major depression resistant to treatment (H), OCD (obsessive compulsive disorder), Hypothyroidism due to acquired atrophy of thyroid, Encounter for therapeutic drug monitoring       * Notice:  This list has 2 medication(s) that are the same as other medications prescribed for you. Read the directions carefully, and ask your doctor or other care provider to review them with you.

## 2017-06-27 NOTE — PROGRESS NOTES
"      Psychiatric Progress Note    Name: Ellie Lewis  Date: 6/27/2017  Length of Visit: 30 minutes  MRN: [id number]      Current Outpatient Prescriptions   Medication Sig     FLUoxetine (PROZAC) 20 MG capsule Take 1 capsule (20 mg) by mouth daily     levothyroxine (SYNTHROID/LEVOTHROID) 100 MCG tablet Take 1 tablet (100 mcg) by mouth daily     omeprazole (PRILOSEC) 20 MG CR capsule Take 1 capsule (20 mg) by mouth daily     calcium carb 1250 mg, 500 mg [place],/vitamin D 200 units (OSCAL WITH D) 500-200 MG-UNIT per tablet Take 1 tablet by mouth daily     Ascorbic Acid (VITAMIN C PO) Take 500 mg by mouth daily      No current facility-administered medications for this visit.        Therapist:  Meets with therapist monthly.    PHQ-9:  PHQ-9 score:    PHQ-9 SCORE 6/6/2017   Total Score 9       OZ-7:  OZ-7 SCORE 10/20/2016 11/28/2016 6/6/2017   Total Score 15 6 5           Interim History:  Patient returns for follow up from initial appointment 6-6-2017. Clomipramine (Anadranil) was tapered to discontinue concurrently starting fluoxetine (Prozac) for OCD.     Today, patient reports \"No problems\" with the fluoxetine (Prozac) 20 mg daily. Patient reports the ringing in ears is less and she no longer has muscle twitches have resolved since discontinuing the Anafranil. Patient reports less OCD symptoms related to worry about cancer issues and feels \"more peaceful\". Patient reports some difficulty remains in managing thoughts and would like to see a bit more improvement. Patient is working on weight loss after gaining while taking Anafranil. Patient would like to work with another therapist and information was provided.       History reviewed. No pertinent past medical history.    10 point ROS is negative except for those listed above.     Vital Signs:   /77 (BP Location: Left arm, Patient Position: Chair, Cuff Size: Adult Regular)  Pulse 72  Temp 96.1  F (35.6  C) (Tympanic)  Wt 174 lb 9.6 oz (79.2 kg)  BMI " "29.05 kg/m2      Mental Status Assessment:  Appearance:  Well groomed      Behavior/relationship to examiner/demeanor:  Cooperative, engaged and pleasant  Motor activity:  Normal  Gait:  Normal   Speech:  Normal in volume, articulation, coherence   Mood (subjective report):  \"Good\"  Affect (objective appearance):  Mood congruent  Thought Process (Associations):  Logical, linear and goal directed  Thought content:  No evidence of suicidal or homicidal ideation,          no overt psychosis and                    patient does not appear to be responding to internal stimuli  Oriented to person, place, date/time   Attention Span and concentration: Intact   Memory:  Short-term memory intact and Long-term memory; Intact  Language:  Fluent   Fund of Knowledge/Intelligence:  Average  Use of language: Intact   Abstraction:  Normal  Insight:  Adequate  Judgment:  Adequate for safety    DSM DIAGNOSIS:  300.3 (F42) Obsessive Compulsive Disorder    Assessment:  Patient is doing pretty well, but would like some additional improvement. Increasing fluoxetine (Prozac) is likely to be helpful. Patient will likely return to PCP after next appointment.     Medication side effects and alternatives were reviewed.     Treatment Plan:  Increase to fluoxetine (Prozac) 30 mg daily.     Schedule individual therapy. Information provided for [facility] and scheduling information.     Follow up one month.     - Recommend patient discuss medications with their pharmacist. Risks and benefits for medications were discussed including, but not limited to, side effects.   - Safety plan was reviewed; to the ER as needed or call after hours crisis line; [phone]  - Education and counseling was done regarding use of medications, psychotherapy options  - Call [phone] for appointment or to speak to a nurse.    -Office hours: Monday through Thursday 8:00 am to 4:30 pm; Friday 8:00 am to Noon.   - Patient received a copy of this Treatment Plan " today.    Patient will continue to be seen for ongoing consultation and stabilization.      Signed:  Nany Payne RN, MS, CNS-BC

## 2017-06-27 NOTE — PATIENT INSTRUCTIONS
Treatment Plan:  Increase to fluoxetine (Prozac) 30 mg daily.     Schedule individual therapy.     Follow up one month.     - Recommend patient discuss medications with their pharmacist. Risks and benefits for medications were discussed including, but not limited to, side effects.   - Safety plan was reviewed; to the ER as needed or call after hours crisis line; [phone]  - Education and counseling was done regarding use of medications, psychotherapy options  - Call [phone] for appointment or to speak to a nurse.    -Office hours: Monday through Thursday 8:00 am to 4:30 pm; Friday 8:00 am to Noon.   - Patient received a copy of this Treatment Plan today.

## 2017-06-28 ASSESSMENT — ANXIETY QUESTIONNAIRES: GAD7 TOTAL SCORE: 2

## 2017-06-28 ASSESSMENT — PATIENT HEALTH QUESTIONNAIRE - PHQ9: SUM OF ALL RESPONSES TO PHQ QUESTIONS 1-9: 4

## 2017-08-07 DIAGNOSIS — E03.4 HYPOTHYROIDISM DUE TO ACQUIRED ATROPHY OF THYROID: ICD-10-CM

## 2017-08-07 NOTE — TELEPHONE ENCOUNTER
levothyroxine (SYNTHROID/LEVOTHROID) 100 MCG tablet     Last Written Prescription Date: 6/1/17  Last Quantity: 90, # refills: 1  Last Office Visit with [facility], [facility] or [facility] prescribing provider: 5/30/17   Next 5 appointments (look out 90 days)     Aug 14, 2017  9:30 AM CDT   [facility]    [address] [facility] [address] [phone]                   TSH   Date Value Ref Range Status   05/30/2017 4.16 (H) 0.40 - 4.00 mU/L Final

## 2017-08-09 RX ORDER — LEVOTHYROXINE SODIUM 100 UG/1
100 TABLET ORAL DAILY
Qty: 90 TABLET | Refills: 0 | Status: SHIPPED | OUTPATIENT
Start: 2017-08-09 | End: 2017-12-14

## 2017-08-09 NOTE — TELEPHONE ENCOUNTER
Medication is being filled for 1 time refill only due to:  Patient needs labs thyroid. Future labs ordered yes.   Vamsi Zhao RN

## 2017-08-14 ENCOUNTER — OFFICE VISIT (OUTPATIENT)
Dept: FAMILY MEDICINE | Facility: CLINIC | Age: 67
End: 2017-08-14
Payer: MEDICARE

## 2017-08-14 VITALS
DIASTOLIC BLOOD PRESSURE: 70 MMHG | WEIGHT: 174 LBS | TEMPERATURE: 97 F | HEIGHT: 65 IN | BODY MASS INDEX: 28.99 KG/M2 | SYSTOLIC BLOOD PRESSURE: 117 MMHG | HEART RATE: 58 BPM

## 2017-08-14 DIAGNOSIS — H02.825 CYST OF LEFT LOWER EYELID: ICD-10-CM

## 2017-08-14 DIAGNOSIS — E03.4 HYPOTHYROIDISM DUE TO ACQUIRED ATROPHY OF THYROID: Primary | ICD-10-CM

## 2017-08-14 DIAGNOSIS — F42.8 OTHER OBSESSIVE-COMPULSIVE DISORDERS: ICD-10-CM

## 2017-08-14 LAB
T4 FREE SERPL-MCNC: 1.17 NG/DL (ref 0.76–1.46)
TSH SERPL DL<=0.005 MIU/L-ACNC: 0.71 MU/L (ref 0.4–4)

## 2017-08-14 PROCEDURE — 84439 ASSAY OF FREE THYROXINE: CPT | Performed by: FAMILY MEDICINE

## 2017-08-14 PROCEDURE — 10060 I&D ABSCESS SIMPLE/SINGLE: CPT | Performed by: FAMILY MEDICINE

## 2017-08-14 PROCEDURE — 36415 COLL VENOUS BLD VENIPUNCTURE: CPT | Performed by: FAMILY MEDICINE

## 2017-08-14 PROCEDURE — 99213 OFFICE O/P EST LOW 20 MIN: CPT | Mod: 25 | Performed by: FAMILY MEDICINE

## 2017-08-14 PROCEDURE — 84443 ASSAY THYROID STIM HORMONE: CPT | Performed by: FAMILY MEDICINE

## 2017-08-14 RX ORDER — FLUOXETINE 10 MG/1
10 CAPSULE ORAL DAILY
Qty: 90 CAPSULE | Refills: 1 | Status: SHIPPED | OUTPATIENT
Start: 2017-08-14 | End: 2017-10-27

## 2017-08-14 NOTE — PROGRESS NOTES
"SUBJECTIVE:                                                    Ellie Lewis is a 67 year old female who presents to clinic today for the following health issues:    Patient is here today with concerns of a skin tag on her left eyelid. She would like it removed today if possible.     Problem list and histories reviewed & adjusted, as indicated.  Additional history: she also will not longer be seeing Nany Payne but will be getting her refills through this office. She has been doing very well on the prozac alone she has not had any of the reaction she had the last time she was on the prozac. She has also been able to lose some weight she had gained a lot of weight due to the clomipramine  She has been feeling just a bit more depressed lately. She is due fro her thyroid recheck as she was not fully corrected last time. If this is not high enough this may help improve her mood also     Patient Active Problem List   Diagnosis     Hypothyroidism due to acquired atrophy of thyroid     Recurrent major depression resistant to treatment (H)     OCD (obsessive compulsive disorder)     Depression     Status post total replacement of right hip     History reviewed. No pertinent surgical history.    Social History   Substance Use Topics     Smoking status: Never Smoker     Smokeless tobacco: Never Used     Alcohol use No     History reviewed. No pertinent family history.          ROS:  Constitutional, HEENT, cardiovascular, pulmonary, gi and gu systems are negative, except as otherwise noted.      OBJECTIVE:                                                    /70 (BP Location: Right arm, Patient Position: Chair, Cuff Size: Adult Regular)  Pulse 58  Temp 97  F (36.1  C)  Ht 5' 5\" (1.651 m)  Wt 174 lb (78.9 kg)  BMI 28.96 kg/m2 There is no height or weight on file to calculate BMI.   GENERAL APPEARANCE: healthy, alert and no distress  EYES: PERRL, conjunctivae and sclerae normal and eyelids- cyst left lower medial " edge of lid white 2 mm does not interfere with visual field   HENT: ear canals and TM's normal and nose and mouth without ulcers or lesions  NECK: no adenopathy, no asymmetry, masses, or scars and thyroid normal to palpation  RESP: lungs clear to auscultation - no rales, rhonchi or wheezes  CV: regular rates and rhythm, normal S1 S2, no S3 or S4 and no murmur, click or rub  ABDOMEN: soft, nontender, without hepatosplenomegaly or masses and bowel sounds normal  MENTAL STATUS EXAM:    1. Clinical observations: Ellie was clean and was adequately groomed. Ellie's emotional presentation was open and cooperative. She spoke clear and articulate. She maintained good eye contact and she was cooperative in answering questions.   2. She appeared to be well-oriented in all spheres with coherent, logical, goal directed and relevent thinking.   3. Thought content: She denies no abnormal thought process.   4. Affect and mood: Skys affect is described as normal/appropriate and her emotional attitude was open and cooperative. She reports the following sypmtoms: sad feeling or depressed, lack of interest or enjoyment and less energy than usual.    5. Sensorium and cognition: She was in contact with reality and oriented to time, place and person.  She demonstrated no impairment in immediate, recent, or remote memory. Her insight was adequate and her  intelligence appeared to be average.       Procedure note after informed consent and ensuring that patient really wanted me to try doing this the patient was placed supine the area was carefully cleansed with alcohol a 25 gauge need was used to open the area by puncturing the cyst in 2 directions sterile cotton was used to gently remove the contents for the cyst until serosanguinous fluid was obtained. Slight pressure was held to the area until the bleeding stopped and the cyst resolved. Patient tolerated procedure well .   ASSESSMENT/PLAN:                                                     1. Hypothyroidism due to acquired atrophy of thyroid  Will increase medication if underreplaced  - TSH  - T4, free    2. Other obsessive-compulsive disorders  Cont on the 30 mg for now  - FLUoxetine (PROZAC) 20 MG capsule; Take 1 capsule (20 mg) by mouth daily Take with 10 mg daily for total 30 mg daily dose.  Dispense: 90 capsule; Refill: 1  - FLUoxetine (PROZAC) 10 MG capsule; Take 1 capsule (10 mg) by mouth daily Take with 20 mg daily for total 30 mg daily dose.  Dispense: 90 capsule; Refill: 1          3. Cyst of left lower eyelid  Drained if recurrent or complications please call   - PUNCTURE ASPIRATION ABSCESS/HEMATOMA/CYST     reports that she has never smoked. She does not have any smokeless tobacco history on file.        Weight management plan: Discussed healthy diet and exercise guidelines and patient will follow up in 6 months in clinic to re-evaluate.      Rima Agarwal M.D.    [facility]

## 2017-08-14 NOTE — MR AVS SNAPSHOT
"              After Visit Summary   2017    Ellie Lewis    MRN: [id number]           Patient Information     Date Of Birth          1950        Visit Information        Provider Department      2017 9:30 AM [facility]        Today's Diagnoses     Hypothyroidism due to acquired atrophy of thyroid    -  1    Other obsessive-compulsive disorders           Follow-ups after your visit        Who to contact     Normal or non-critical lab and imaging results will be communicated to you by [employer], letter or phone within 4 business days after the clinic has received the results. If you do not hear from us within 7 days, please contact the clinic through [employer] or phone. If you have a critical or abnormal lab result, we will notify you by phone as soon as possible.  Submit refill requests through [employer] or call your pharmacy and they will forward the refill request to us. Please allow 3 business days for your refill to be completed.          If you need to speak with a  for additional information , please call: [phone]             Additional Information About Your Visit        [employer] Information     [employer] lets you send messages to your doctor, view your test results, renew your prescriptions, schedule appointments and more. To sign up, go to [email or web address] . Click on \"Log in\" on the left side of the screen, which will take you to the Welcome page. Then click on \"Sign up Now\" on the right side of the page.     You will be asked to enter the access code listed below, as well as some personal information. Please follow the directions to create your username and password.     Your access code is: [email or web address]  Expires: 2017  3:51 PM     Your access code will  in 90 days. If you need help or a new code, please call your [place] clinic or [phone].        Care EveryWhere ID     This is your Care EveryWhere ID. This could be used " "by other organizations to access your [facility] medical records  [id number]        Your Vitals Were     Pulse Temperature Height BMI (Body Mass Index)          58 97  F (36.1  C) 5' 5\" (1.651 m) 28.96 kg/m2         Blood Pressure from Last 3 Encounters:   08/14/17 117/70   06/27/17 118/77   06/06/17 113/76    Weight from Last 3 Encounters:   08/14/17 174 lb (78.9 kg)   06/27/17 174 lb 9.6 oz (79.2 kg)   06/06/17 173 lb (78.5 kg)              We Performed the Following     T4, free     TSH          Where to get your medicines      These medications were sent to [facility] - [address], [facility] [address]     Phone:  [phone]     FLUoxetine 10 MG capsule    FLUoxetine 20 MG capsule          Primary Care Provider Office Phone # Fax #    Rima Agarwal [phone] [address] [facility] [address]        Equal Access to Services     [facility]: Hadii aad ku hadasho Soomaali, waaxda luqadaha, qaybta kaalmada adeegyada, chico mina . So [facility] [phone].    ATENCIÓN: Si habla español, tiene a terrazas disposición servicios gratuitos de asistencia lingüística. [facility] [phone].    We comply with applicable federal civil rights laws and [place] laws. We do not discriminate on the basis of race, color, national origin, age, disability sex, sexual orientation or gender identity.            Thank you!     Thank you for choosing [facility]  for your care. Our goal is always to provide you with excellent care. Hearing back from our patients is one way we can continue to improve our services. Please take a few minutes to complete the written survey that you may receive in the mail after your visit with us. Thank you!             Your Updated Medication List - Protect others around you: Learn how to safely use, store and throw away your medicines at www.disposemymeds.org.        "   This list is accurate as of: 8/14/17 11:00 AM.  Always use your most recent med list.                   Brand Name Dispense Instructions for use Diagnosis    calcium carb 1250 mg (500 mg [place])/vitamin D 200 units 500-200 MG-UNIT per tablet    OSCAL with D     Take 1 tablet by mouth daily        * FLUoxetine 20 MG capsule    PROzac    90 capsule    Take 1 capsule (20 mg) by mouth daily Take with 10 mg daily for total 30 mg daily dose.    Other obsessive-compulsive disorders       * FLUoxetine 10 MG capsule    PROzac    90 capsule    Take 1 capsule (10 mg) by mouth daily Take with 20 mg daily for total 30 mg daily dose.    Other obsessive-compulsive disorders       levothyroxine 100 MCG tablet    SYNTHROID/LEVOTHROID    90 tablet    Take 1 tablet (100 mcg) by mouth daily    Hypothyroidism due to acquired atrophy of thyroid       omeprazole 20 MG CR capsule    priLOSEC    30 capsule    Take 1 capsule (20 mg) by mouth daily    Gastroesophageal reflux disease without esophagitis       VITAMIN C PO      Take 500 mg by mouth daily    Recurrent major depression resistant to treatment (H), OCD (obsessive compulsive disorder), Hypothyroidism due to acquired atrophy of thyroid, Encounter for therapeutic drug monitoring       * Notice:  This list has 2 medication(s) that are the same as other medications prescribed for you. Read the directions carefully, and ask your doctor or other care provider to review them with you.

## 2017-08-14 NOTE — NURSING NOTE
"Chief Complaint   Patient presents with     Growth on Eyelid       Initial /70 (BP Location: Right arm, Patient Position: Chair, Cuff Size: Adult Regular)  Pulse 58  Ht 5' 5\" (1.651 m)  Wt 174 lb (78.9 kg)  BMI 28.96 kg/m2 Estimated body mass index is 28.96 kg/(m^2) as calculated from the following:    Height as of this encounter: 5' 5\" (1.651 m).    Weight as of this encounter: 174 lb (78.9 kg).  Medication Reconciliation: complete     Jelani Avendaño CMA    "

## 2017-10-27 DIAGNOSIS — F42.8 OTHER OBSESSIVE-COMPULSIVE DISORDERS: ICD-10-CM

## 2017-10-30 RX ORDER — FLUOXETINE 10 MG/1
CAPSULE ORAL
Qty: 90 CAPSULE | Refills: 2 | Status: SHIPPED | OUTPATIENT
Start: 2017-10-30 | End: 2017-11-21 | Stop reason: DRUGHIGH

## 2017-10-30 NOTE — TELEPHONE ENCOUNTER
Prescription approved per [facility] Refill Protocol.  Vamsi Zhao RN

## 2017-11-21 ENCOUNTER — RADIANT APPOINTMENT (OUTPATIENT)
Dept: GENERAL RADIOLOGY | Facility: CLINIC | Age: 67
End: 2017-11-21
Attending: FAMILY MEDICINE
Payer: MEDICARE

## 2017-11-21 ENCOUNTER — OFFICE VISIT (OUTPATIENT)
Dept: FAMILY MEDICINE | Facility: CLINIC | Age: 67
End: 2017-11-21
Payer: MEDICARE

## 2017-11-21 VITALS
OXYGEN SATURATION: 96 % | HEIGHT: 65 IN | WEIGHT: 174.3 LBS | DIASTOLIC BLOOD PRESSURE: 66 MMHG | HEART RATE: 64 BPM | BODY MASS INDEX: 29.04 KG/M2 | TEMPERATURE: 98 F | SYSTOLIC BLOOD PRESSURE: 114 MMHG

## 2017-11-21 DIAGNOSIS — Z23 NEED FOR TETANUS BOOSTER: ICD-10-CM

## 2017-11-21 DIAGNOSIS — M54.50 LEFT-SIDED LOW BACK PAIN WITHOUT SCIATICA, UNSPECIFIED CHRONICITY: ICD-10-CM

## 2017-11-21 DIAGNOSIS — F33.41 RECURRENT MAJOR DEPRESSIVE DISORDER, IN PARTIAL REMISSION (H): ICD-10-CM

## 2017-11-21 DIAGNOSIS — E03.9 ACQUIRED HYPOTHYROIDISM: ICD-10-CM

## 2017-11-21 DIAGNOSIS — Z00.00 MEDICARE ANNUAL WELLNESS VISIT, SUBSEQUENT: Primary | ICD-10-CM

## 2017-11-21 DIAGNOSIS — F33.9 RECURRENT MAJOR DEPRESSION RESISTANT TO TREATMENT (H): ICD-10-CM

## 2017-11-21 DIAGNOSIS — M17.12 ARTHRITIS OF LEFT KNEE: ICD-10-CM

## 2017-11-21 DIAGNOSIS — G56.03 BILATERAL CARPAL TUNNEL SYNDROME: ICD-10-CM

## 2017-11-21 DIAGNOSIS — Z23 NEED FOR VACCINATION: ICD-10-CM

## 2017-11-21 DIAGNOSIS — Z12.11 SPECIAL SCREENING FOR MALIGNANT NEOPLASMS, COLON: ICD-10-CM

## 2017-11-21 PROBLEM — Z71.89 ADVANCED CARE PLANNING/COUNSELING DISCUSSION: Status: ACTIVE | Noted: 2017-11-21

## 2017-11-21 PROCEDURE — 99213 OFFICE O/P EST LOW 20 MIN: CPT | Mod: 25 | Performed by: FAMILY MEDICINE

## 2017-11-21 PROCEDURE — 90714 TD VACC NO PRESV 7 YRS+ IM: CPT | Performed by: FAMILY MEDICINE

## 2017-11-21 PROCEDURE — G0009 ADMIN PNEUMOCOCCAL VACCINE: HCPCS | Mod: 59 | Performed by: FAMILY MEDICINE

## 2017-11-21 PROCEDURE — 72100 X-RAY EXAM L-S SPINE 2/3 VWS: CPT

## 2017-11-21 PROCEDURE — 90471 IMMUNIZATION ADMIN: CPT | Performed by: FAMILY MEDICINE

## 2017-11-21 PROCEDURE — 90670 PCV13 VACCINE IM: CPT | Performed by: FAMILY MEDICINE

## 2017-11-21 PROCEDURE — G0439 PPPS, SUBSEQ VISIT: HCPCS | Performed by: FAMILY MEDICINE

## 2017-11-21 RX ORDER — LEVOTHYROXINE SODIUM 100 UG/1
100 TABLET ORAL DAILY
Qty: 90 TABLET | Refills: 1 | Status: SHIPPED | OUTPATIENT
Start: 2017-11-21 | End: 2018-04-06

## 2017-11-21 RX ORDER — FLUOXETINE 40 MG/1
40 CAPSULE ORAL DAILY
Qty: 90 CAPSULE | Refills: 1 | Status: SHIPPED | OUTPATIENT
Start: 2017-11-21 | End: 2018-04-30

## 2017-11-21 ASSESSMENT — PATIENT HEALTH QUESTIONNAIRE - PHQ9: SUM OF ALL RESPONSES TO PHQ QUESTIONS 1-9: 12

## 2017-11-21 NOTE — PROGRESS NOTES
SUBJECTIVE:   Ellie Lewis is a 67 year old female who presents for Preventive Visit.  Are you in the first 12 months of your Medicare Part B coverage?  No    Healthy Habits:    Do you get at least three servings of calcium containing foods daily (dairy, green leafy vegetables, etc.)? yes    Amount of exercise or daily activities, outside of work: 1 1.5 miles 2x weekly     Problems taking medications regularly No    Medication side effects: No    Have you had an eye exam in the past two years? yes    Do you see a dentist twice per year? yes    Do you have sleep apnea, excessive snoring or daytime drowsiness?no    COGNITIVE SCREEN  1) Repeat 3 items (Banana, Sunrise, Chair)    2) Clock draw: NORMAL  3) 3 item recall: Recalls 2 objects   Results: NORMAL clock, 1-2 items recalled: COGNITIVE IMPAIRMENT LESS LIKELY    Mini-CogTM Copyright S Anu. Licensed by the author for use in [facility]; reprinted with permission (kiki@[email or web address]). All rights reserved.      Reviewed and updated as needed this visit by clinical staff  Tobacco  Allergies  Med Hx  Surg Hx  Fam Hx  Soc Hx        Reviewed and updated as needed this visit by Provider        Social History   Substance Use Topics     Smoking status: Never Smoker     Smokeless tobacco: Never Used     Alcohol use No       The patient does not drink >3 drinks per day nor >7 drinks per week.     Today's PHQ-2 Score:   PHQ-2 ( 1999 [employer]) 11/21/2017 10/20/2016   Q1: Little interest or pleasure in doing things 2 3   Q2: Feeling down, depressed or hopeless 3 3   PHQ-2 Score 5 6         Do you feel safe in your environment - Yes    Do you have a Health Care Directive?: No: Advance care planning reviewed with patient; information given to patient to review.      Current providers sharing in care for this patient include: Patient Care Team:  Rima Agarwal MD as PCP - General (Family Practice)      Hearing impairment: No    Ability to successfully perform  activities of daily living: Yes, no assistance needed     Fall risk:         Home safety:  lack of grab bars in the bathroom      The following health maintenance items are reviewed in Epic and correct as of today:Health Maintenance   Topic Date Due     TETANUS IMMUNIZATION (SYSTEM ASSIGNED)  02/10/1967     COLON CANCER SCREEN (SYSTEM ASSIGNED)  02/10/1999     ADVANCE DIRECTIVE PLANNING Q5 YRS  02/10/2004     DEXA SCAN SCREENING (SYSTEM ASSIGNED)  02/10/2014     PNEUMOCOCCAL (2 of 2 - PCV13) 11/03/2016     INFLUENZA VACCINE (SYSTEM ASSIGNED)  09/01/2017     FALL RISK ASSESSMENT  09/01/2017     OZ QUESTIONNAIRE 6 MONTHS  12/27/2017     PHQ-9 Q6 MONTHS  12/27/2017     MAMMO SCREEN Q2 YR (SYSTEM ASSIGNED)  12/05/2018     LIPID SCREEN Q5 YR FEMALE (SYSTEM ASSIGNED)  10/03/2021     HEPATITIS C SCREENING  Completed     BP Readings from Last 3 Encounters:   11/21/17 114/66   08/14/17 117/70   06/27/17 118/77    Wt Readings from Last 3 Encounters:   11/21/17 174 lb 4.8 oz (79.1 kg)   08/14/17 174 lb (78.9 kg)   06/27/17 174 lb 9.6 oz (79.2 kg)                  Patient Active Problem List   Diagnosis     Hypothyroidism due to acquired atrophy of thyroid     Recurrent major depression resistant to treatment (H)     OCD (obsessive compulsive disorder)     Depression     Status post total replacement of right hip     Advanced care planning/counseling discussion     History reviewed. No pertinent surgical history.    Social History   Substance Use Topics     Smoking status: Never Smoker     Smokeless tobacco: Never Used     Alcohol use No     History reviewed. No pertinent family history.      Has had some lower back pain constant   Feels better lying down   More uncomfortable sitting pain in the left knee   Walking is ok   Pain with transition s  Getting in and out of the car   Has been bad for 3 month s no injury   Has not had in the past   Left lateral back pain can be better with postional change   Uses heat has not tried  "ibuprofen or tylenol   Has gone to the gym and is walking no weakness has left knee pain also       She also has numbness in the hands at night when she changes positions it gets better   She shakes out the hands and it is better   Hasn't tried any braces          Pneumonia Vaccine:Adults age 65+ who have not received previous Pneumovax (PPSV23) or PCV13 as an adult: Should first be given PCV13 AND then should be given PPSV23 6-12 months after PCV13  Mammogram Screening: Mammo discussed, not appropriate for or declined by this patient.    ROS:  Constitutional, HEENT, cardiovascular, pulmonary, gi and gu systems are negative, except as otherwise noted.      OBJECTIVE:   /66  Pulse 64  Temp 98  F (36.7  C) (Tympanic)  Ht 5' 5\" (1.651 m)  Wt 174 lb 4.8 oz (79.1 kg)  SpO2 96%  BMI 29.01 kg/m2 Estimated body mass index is 29.01 kg/(m^2) as calculated from the following:    Height as of this encounter: 5' 5\" (1.651 m).    Weight as of this encounter: 174 lb 4.8 oz (79.1 kg).  EXAM:   GENERAL APPEARANCE: healthy, alert and no distress  EYES: Eyes grossly normal to inspection, PERRL and conjunctivae and sclerae normal  HENT: ear canals and TM's normal, nose and mouth without ulcers or lesions, oropharynx clear and oral mucous membranes moist  NECK: no adenopathy, no asymmetry, masses, or scars and thyroid normal to palpation  RESP: lungs clear to auscultation - no rales, rhonchi or wheezes  BREAST: normal without masses, tenderness or nipple discharge and no palpable axillary masses or adenopathy  CV: regular rate and rhythm, normal S1 S2, no S3 or S4, no murmur, click or rub, no peripheral edema and peripheral pulses strong  ABDOMEN: soft, nontender, no hepatosplenomegaly, no masses and bowel sounds normal  MS: gait is age appropriate without ataxia, LLE exam reveals joint mobility unable to completely extend crepitance with flexion and extension mild effusion medial left knee , spine/back exam reveals ROM is " normal and tender left pars with some muscle triggering and also over let paraspinal muscle lumbar and some bony tenderness over l3-s1  Bilateral hands normal muscle mass no wasting normal  strength neg tinel   SKIN: no suspicious lesions or rashes  NEURO: Normal strength and tone, sensory exam grossly normal, mentation intact and speech normal  PSYCH: mentation appears normal and affect normal/bright    ASSESSMENT / PLAN:   1. Medicare annual wellness visit, subsequent      2. Recurrent major depression resistant to treatment (H)      3. Recurrent major depressive disorder, in partial remission (H)  Will increase to 40 mg daily she will send an update in 3-4 weeks   - FLUoxetine (PROZAC) 40 MG capsule; Take 1 capsule (40 mg) by mouth daily  Dispense: 90 capsule; Refill: 1    4. Bilateral carpal tunnel syndrome  Wear splints at night     5. Left-sided low back pain without sciatica, unspecified chronicity  thre is spondylo and extensive arthritis with loss of disc height and ? Old fracture await radiology   - XR Lumbar Spine 2/3 Views; Future    6. Need for vaccination    - PNEUMOCOCCAL CONJ VACCINE 13 VALENT IM    7. Arthritis of left knee  Ice and elevate     8. Special screening for malignant neoplasms, colon    - Fecal colorectal cancer screen (FIT); Future    9. Need for tetanus booster    - TD (ADULT, 7+) PRESERVE FREE    10. Acquired hypothyroidism  Checked recently   - levothyroxine (SYNTHROID/LEVOTHROID) 100 MCG tablet; Take 1 tablet (100 mcg) by mouth daily  Dispense: 90 tablet; Refill: 1    End of Life Planning:  Patient currently has an advanced directive: No.  I have verified the patient's ablity to prepare an advanced directive/make health care decisions.  Literature was provided to assist patient in preparing an advanced directive.    COUNSELING:  Reviewed preventive health counseling, as reflected in patient instructions        Estimated body mass index is 28.96 kg/(m^2) as calculated from the  "following:    Height as of 8/14/17: 5' 5\" (1.651 m).    Weight as of 8/14/17: 174 lb (78.9 kg).     reports that she has never smoked. She has never used smokeless tobacco.        Appropriate preventive services were discussed with this patient, including applicable screening as appropriate for cardiovascular disease, diabetes, osteopenia/osteoporosis, and glaucoma.  As appropriate for age/gender, discussed screening for colorectal cancer, prostate cancer, breast cancer, and cervical cancer. Checklist reviewing preventive services available has been given to the patient.    Reviewed patients plan of care and provided an AVS. The Intermediate Care Plan ( asthma action plan, low back pain action plan, and migraine action plan) for Ellie meets the Care Plan requirement. This Care Plan has been established and reviewed with the Patient.    Counseling Resources:  ATP IV Guidelines  Pooled Cohorts Equation Calculator  Breast Cancer Risk Calculator  FRAX Risk Assessment  ICSI Preventive Guidelines  Dietary Guidelines for Americans, 2010  [employer]'s MyPlate  ASA Prophylaxis  Lung CA Screening    Rima Agarwal MD  [facility]  "

## 2017-11-21 NOTE — MR AVS SNAPSHOT
After Visit Summary   11/21/2017    Ellie Lewis    MRN: [id number]           Patient Information     Date Of Birth          1950        Visit Information        Provider Department      11/21/2017 9:00 AM Rima Agarwal MD [facility]        Today's Diagnoses     Need for vaccination    -  1    Special screening for malignant neoplasms, colon        Need for tetanus booster        Recurrent major depression resistant to treatment (H)        Recurrent major depressive disorder, in partial remission (H)        Acquired hypothyroidism        Left-sided low back pain without sciatica, unspecified chronicity        Arthritis of left knee        Bilateral carpal tunnel syndrome          Care Instructions      Preventive Health Recommendations    Female Ages 65 +    Yearly exam:     See your health care provider every year in order to  o Review health changes.   o Discuss preventive care.    o Review your medicines if your doctor has prescribed any.      You no longer need a yearly Pap test unless you've had an abnormal Pap test in the past 10 years. If you have vaginal symptoms, such as bleeding or discharge, be sure to talk with your provider about a Pap test.      Every 1 to 2 years, have a mammogram.  If you are over 69, talk with your health care provider about whether or not you want to continue having screening mammograms.      Every 10 years, have a colonoscopy. Or, have a yearly FIT test (stool test). These exams will check for colon cancer.       Have a cholesterol test every 5 years, or more often if your doctor advises it.       Have a diabetes test (fasting glucose) every three years. If you are at risk for diabetes, you should have this test more often.       At age 65, have a bone density scan (DEXA) to check for osteoporosis (brittle bone disease).    Shots:    Get a flu shot each year.    Get a tetanus shot every 10 years.    Talk to your doctor about your pneumonia  vaccines. There are now two you should receive - Pneumovax (PPSV 23) and Prevnar (PCV 13).    Talk to your doctor about the shingles vaccine.    Talk to your doctor about the hepatitis B vaccine.    Nutrition:     Eat at least 5 servings of fruits and vegetables each day.      Eat whole-grain bread, whole-wheat pasta and brown rice instead of white grains and rice.      Talk to your provider about Calcium and Vitamin D.     Lifestyle    Exercise at least 150 minutes a week (30 minutes a day, 5 days a week). This will help you control your weight and prevent disease.      Limit alcohol to one drink per day.      No smoking.       Wear sunscreen to prevent skin cancer.       See your dentist twice a year for an exam and cleaning.      See your eye doctor every 1 to 2 years to screen for conditions such as glaucoma, macular degeneration and cataracts.        3m futuro energizing wrist splints              Low Back Pain            What is low back pain?   Low back pain is pain and stiffness in the lower back. It is one of the most common reasons people miss work.   How does it occur?   Your lower back is called your lumbar spine. It is made up of 5 bones called lumbar vertebrae. In between the vertebrae are shock absorbers called disks. Back pain can occur from an injury to the vertebrae or when a disk bulges or herniates.   Low back pain is usually caused when a ligament or muscle holding a vertebra in its proper position is strained. Vertebrae are bones that make up the spinal column through which the spinal cord passes. When these muscles or ligaments become weak or strained, the spine loses its stability, resulting in pain.   Low back pain can occur if your job involves lifting and carrying heavy objects, or if you spend a lot of time sitting or standing in one position or bending over. It can be caused by a fall or by unusually strenuous exercise. It can be brought on by the tension and stress that cause headaches  in some people. It can even be brought on by violent sneezing or coughing.   People who are overweight may have low back pain because of the added stress on their back.   Back pain may occur when the muscles, joints, bones, and connective tissues of the back become inflamed as a result of an infection or an immune system problem. Arthritic disorders as well as some congenital and degenerative conditions may cause back pain.   Back pain accompanied by loss of bladder or bowel control, trouble moving your legs, or numbness or tingling in your arms or legs requires immediate medical treatment.   What are the symptoms?   Symptoms include:   pain in the back or legs   stiffness, spasm, or limited motion   The pain may be constant or may happen only in certain positions. It may get worse when you cough, sneeze, bend, twist, or strain during a bowel movement. The pain may be in only one spot or may spread to other areas, most commonly down the buttocks and into the back of the thigh.   A low back strain typically does not produce pain past the knee into the calf or foot. Tingling or numbness in the calf or foot may indicate a herniated disk or pinched nerve.   Be sure to see your healthcare provider if:   You have weakness in your leg, especially if you cannot lift your foot, because this may be a sign of nerve damage.   You have new bowel or bladder problems as well as back pain, which may be a sign of severe injury to your spinal cord.   You have pain that gets worse despite treatment.   How is it diagnosed?   Your healthcare provider will review your medical history and examine you. You may have X-rays, an MRI, CT scan, or a bone scan.   How is it treated?   To treat this condition:   Put an ice pack, gel pack, or package of frozen vegetables, wrapped in a cloth on the area every 3 to 4 hours, for up to 20 minutes at a time for the first 2 or 3 days.   Use a heating pad or hot water bottle. Don't let the heating pad get  too hot, and don't fall asleep with it. You could get a burn.   Rest in bed on a firm mattress. Often it helps to lie on your back with your knees raised on a pillow. However, some people prefer to lie on their side with their knees bent. It's best to try to stay active, so try not to rest in bed longer than 1 to 2 days.   Take muscle relaxants as recommended by your healthcare provider.   Take an anti-inflammatory such as ibuprofen, or other medicine as directed by your provider. Nonsteroidal anti-inflammatory medicines (NSAIDs) may cause stomach bleeding and other problems. These risks increase with age. Read the label and take as directed. Unless recommended by your healthcare provider, do not take for more than 10 days.   Get a back massage by a trained person.   Wear a belt or corset to support your back.   Do the exercises recommended by your provider. Your provider may also prescribe physical therapy.   Talk with a counselor, if your back pain is related to tension caused by emotional problems.   When the pain is gone, ask your healthcare provider about starting an exercise program such as the following:   Exercise moderately every day, using stretching and warm-up exercises suggested by your provider or physical therapist.   Exercise vigorously for about 30 minutes 3 times a week by walking, swimming, using a stationary bicycle, or doing low-impact aerobics.   Exercising regularly will not only help your back, it will also help keep you healthier overall.   How long will the effects last?   The effects of back pain last as long as the cause exists or until your body recovers from the strain, usually a day or two but sometimes weeks.   How can I take care of myself?   In addition to the treatment described above, keep in mind these suggestions:   Practice good posture. Stand with your head up, shoulders straight, chest forward, weight balanced evenly on both feet, and pelvis tucked in.   Lose weight if you  are overweight   Keep your core muscles strong. These are your abdominal and back muscles.   Sleep without a pillow under your head.   Pain is the best way to  the pace you should set in increasing your activity and exercise. Minor discomfort, stiffness, soreness, and mild aches need not interfere with activity. However, limit your activities temporarily if:   Your symptoms return.   The pain increases when you are more active.   The pain increases within 24 hours after a new or higher level of activity.   When can I return to my normal activities?   Everyone recovers from an injury at a different rate. Return to your activities depends on how soon your back recovers, not by how many days or weeks it has been since your injury has occurred. In general, the longer you have symptoms before you start treatment, the longer it will take to get better. The goal is to return to your normal activities as soon as is safely possible. If you return too soon you may worsen your injury.   It is important that you have fully recovered from your low back pain before you return to any strenuous activity. You must be able to have the same range of motion that you had before your injury. You must be able to walk and twist without pain.   What can I do to help prevent low back pain?   You can reduce the strain on your back by doing the following:   Don't push with your arms when you move a heavy object. Turn around and push backwards so the strain is taken by your legs.   Whenever you sit, sit in a straight-backed chair and hold your spine against the back of the chair.   Bend your knees and hips and keep your back straight when you lift a heavy object.   Avoid lifting heavy objects higher than your waist.   Hold packages you carry close to your body, with your arms bent.   Use a footrest for one foot when you stand or sit in one spot for a long time. This keeps your back straight.   Bend your knees when you bend over.   Sit  close to the pedals when you drive and use your seat belt and a hard backrest or pillow.   Lie on your side with your knees bent when you sleep or rest. It may help to put a pillow between your knees.   Put a pillow under your knees when you sleep on your back.   Raise the foot of the bed 8 inches to discourage sleeping on your stomach unless you have other problems that require that you keep your head elevated.   To rest your back, hold each of these positions for 5?minutes or longer:   Lie on your back, bend your knees, and put pillows under your knees.   Lie on your back on the floor with a pillow under your neck. Bend your knees to a 90-degree angle, and put your lower legs and feet on a chair.   Lie on your back, bend your knees, and bring one knee up to your chest and hold it there. Repeat with the other knee, then bring both knees to your chest. When holding your knee to your chest, grab your thigh rather than your lower leg to avoid over flexing your knee.     Published by [employer].  This content is reviewed periodically and is subject to change as new health information becomes available. The information is intended to inform and educate and is not a replacement for medical evaluation, advice, diagnosis or treatment by a healthcare professional.   Developed by Chyna Meade RN, [place], and [employer] [facility].   ? 2010 [employer] [facility] and/or its affiliates. All Rights Reserved.           Low Back Pain Exercise          Standing hamstring stretch: Put the heel of one leg on a stool about 15 inches high. Keep your leg straight. Lean forward, bending at the hips until you feel a mild stretch in the back of your thigh. Make sure you do not roll your shoulders or bend at the waist when doing this. You want to stretch your leg, not your lower back. Hold the stretch for 15 to 30 seconds. Repeat with each leg 3 times.   Cat and camel: Get down on your hands and knees. Let your stomach sag, allowing your back to curve  downward. Hold this position for 5 seconds. Then arch your back and hold for 5 seconds. Do 3 sets of 10.   Quadruped arm and leg raise: Get down on your hands and knees. Pull in your belly button and tighten your abdominal muscles to stiffen your spine. While keeping your abdominals tight, raise one arm and the opposite leg away from you. Hold this position for 5 seconds. Lower your arm and leg slowly and change sides. Do this 10 times on each side.   Pelvic tilt: Lie on your back with your knees bent and your feet flat on the floor. Tighten your abdominal muscles and push your lower back into the floor. Hold this position for 5 seconds, then relax. Do 3 sets of 10.   Partial curl: Lie on your back with your knees bent and your feet flat on the floor. Tighten your stomach muscles. Tuck your chin to your chest. With your hands stretched out in front of you, curl your upper body forward until your shoulders clear the floor. Hold this position for 3 seconds. Don't hold your breath. It helps to breathe out as you lift your shoulders up. Relax back to the floor. Repeat 10 times. Build to 3 sets of 10. To challenge yourself, clasp your hands behind your head and keep your elbows out to the side.   Gluteal stretch: Lie on your back with both knees bent. Rest the ankle of one leg over the knee of your other leg. Grasp the thigh of the bottom leg and pull toward your chest. You will feel a stretch along the buttocks and possibly along the outside of your hip. Hold the stretch for 15 to 30 seconds. Repeat 3 times with each leg.   Extension exercise:   0. Lie face down on the floor for 5 minutes. If this hurts too much, lie face down with a pillow under your stomach. This should relieve your leg or back pain. When you can lie on your stomach for 5 minutes without a pillow, you can continue with Part B of this exercise.   0. After lying on your stomach for 5 minutes, prop yourself up on your elbows for another 5 minutes. If you  can do this without having more leg or buttock pain, you can start doing part C of this exercise.   0. Lie on your stomach with your hands under your shoulders. Then press down on your hands and extend your elbows while keeping your hips flat on the floor. Hold for 1 second and lower yourself to the floor. Do 3 to 5 sets of 10 repetitions. Rest for 1 minute between sets. You should have no pain in your legs when you do this, but it is normal to feel some pain in your lower back.   Do this exercise several times a day.   Side plank: Lie on your side with your legs, hips, and shoulders in a straight line. Prop yourself up onto your forearm so your elbow is directly under your shoulder. Lift your hips off the floor and balance on your forearm and the outside of your foot. Try to hold this position for 15 seconds, then slowly lower your hip to the ground. Switch sides and repeat. Work up to holding for 1 minute or longer. This exercise can be made easier by starting with your knees and hips flexed toward your chest.   Published by [employer].  This content is reviewed periodically and is subject to change as new health information becomes available. The information is intended to inform and educate and is not a replacement for medical evaluation, advice, diagnosis or treatment by a healthcare professional.   Written by Neela Fletcher MS, PT, and Chyna Chairez PT, [facility], [facility], for [employer]   ? 2010 [facility] and/or its affiliates. All Rights Reserved.         Copyright   [employer] 2011              Exercises to Strengthen Your Lower Back  Strong lower back and abdominal muscles work together to support your spine. The exercises below will help strengthen the lower back. It is important that you begin exercising slowly and increase levels gradually.  Always begin any exercise program with stretching. If you feel pain while doing any of these exercises, stop and talk to your doctor about a more  specific exercise program that better suits your condition.   Low back stretch  The point of stretching is to make you more flexible and increase your range of motion. Stretch only as much as you are able. Stretch slowly. Do not push your stretch to the limit. If at any point you feel pain while stretching, this is your (temporary) limit.    Lie on your back with your knees bent and both feet on the ground.    Slowly raise your left knee to your chest as you flatten your lower back against the floor. Hold for 5 seconds.    Relax and repeat the exercise with your right knee.    Do 10 of these exercises for each leg.    Repeat hugging both knees to your chest at the same time.  Building lower back strength  Start your exercise routine with 10 to 30 minutes a day, 1 to 3 times a day.  Initial exercises  Lying on your back:  1. Ankle pumps: Move your foot up and down, towards your head, and then away. Repeat 10 times with each foot.  2. Heel slides: Slowly bend your knee, drawing the heel of your foot towards you. Then slide your heel/foot from you, straightening your knee. Do not lift your foot off the floor (this is not a leg lift).  3. Abdominal contraction: Bend your knees and put your hands on your stomach. Tighten your stomach muscles. Hold for 5 seconds, then relax. Repeat 10 times.  4. Straight leg raise: Bend one leg at the knee and keep the other leg straight. Tighten your stomach muscles. Slowly lift your straight leg 6 to 12 inches off the floor and hold for up to 5 seconds. Repeat 10 times on each side.  Standin. Wall squats: Stand with your back against the wall. Move your feet about 12 inches away from the wall. Tighten your stomach muscles, and slowly bend your knees until they are at about a 45 degree angle. Do not go down too far. Hold about 5 seconds. Then slowly return to your starting position. Repeat 10 times.  2. Heel raises: Stand facing the wall. Slowly raise the heels of your feet up and  down, while keeping your toes on the floor. If you have trouble balancing, you can touch the wall with your hands. Repeat 10 times.  More advanced exercises  When you feel comfortable enough, try these exercises.  1. Kneeling lumbar extension: Begin on your hands and knees. At the same time, raise and straighten your right arm and left leg until they are parallel to the ground. Hold for 2 seconds and come back slowly to a starting position. Repeat with left arm and right leg, alternating 10 times.  2. Prone lumbar extension: Lie face down, arms extended overhead, palms on the floor. At the same time, raise your right arm and left leg as high as comfortably possible. Hold for 10 seconds and slowly return to start. Repeat with left arm and right leg, alternating 10 times. Gradually build up to 20 times. (Advanced: Repeat this exercise raising both arms and both legs a few inches off the floor at the same time. Hold for 5 seconds and release.)  3. Pelvic tilt: Lie on the floor on your back with your knees bent at 90 degrees. Your feet should be flat on the floor. Inhale, exhale, then slowly contract your abdominal muscles bringing your navel toward your spine. Let your pelvis rock back until your lower back is flat on the floor. Hold for 10 seconds while breathing smoothly.  4. Abdominal crunch: Perform a pelvic tilt (above) flattening your lower back against the floor. Holding the tension in your abdominal muscles, take another breath and raise your shoulder blades off the ground (this is not a full sit-up). Keep your head in line with your body (don t bend your neck forward). Hold for 2 seconds, then slowly lower.  Date Last Reviewed: 6/1/2016 2000-2017 [employer]. [address] [place] [address], [place] [address]. All rights reserved. This information is not intended as a substitute for professional medical care. Always follow your healthcare professional's instructions.                Follow-ups after  "your visit        Future tests that were ordered for you today     Open Future Orders        Priority Expected Expires Ordered    Fecal colorectal cancer screen (FIT) Routine 2017            Who to contact     Normal or non-critical lab and imaging results will be communicated to you by [employer], letter or phone within 4 business days after the clinic has received the results. If you do not hear from us within 7 days, please contact the clinic through MyChart or phone. If you have a critical or abnormal lab result, we will notify you by phone as soon as possible.  Submit refill requests through [employer] or call your pharmacy and they will forward the refill request to us. Please allow 3 business days for your refill to be completed.          If you need to speak with a  for additional information , please call: [phone]             Additional Information About Your Visit        [employer] Information     [employer] lets you send messages to your doctor, view your test results, renew your prescriptions, schedule appointments and more. To sign up, go to [email or web address] . Click on \"Log in\" on the left side of the screen, which will take you to the Welcome page. Then click on \"Sign up Now\" on the right side of the page.     You will be asked to enter the access code listed below, as well as some personal information. Please follow the directions to create your username and password.     Your access code is: [email or web address]  Expires: 2018 10:26 AM     Your access code will  in 90 days. If you need help or a new code, please call your [facility] [place] clinic or [phone].        Care EveryWhere ID     This is your Care EveryWhere ID. This could be used by other organizations to access your [facility] medical records  [id number]        Your Vitals Were     Pulse Temperature Height Pulse Oximetry BMI (Body Mass Index)       64 98  F (36.7  C) (Tympanic) 5' 5\" (1.651 m) 96% " 29.01 kg/m2        Blood Pressure from Last 3 Encounters:   11/21/17 114/66   08/14/17 117/70   06/27/17 118/77    Weight from Last 3 Encounters:   11/21/17 174 lb 4.8 oz (79.1 kg)   08/14/17 174 lb (78.9 kg)   06/27/17 174 lb 9.6 oz (79.2 kg)              We Performed the Following     PNEUMOCOCCAL CONJ VACCINE 13 VALENT IM     TD (ADULT, 7+) PRESERVE FREE          Today's Medication Changes          These changes are accurate as of: 11/21/17 10:30 AM.  If you have any questions, ask your nurse or doctor.               These medicines have changed or have updated prescriptions.        Dose/Directions    FLUoxetine 40 MG capsule   Commonly known as:  PROzac   This may have changed:  See the new instructions.   Used for:  Recurrent major depressive disorder, in partial remission (H)   Changed by:  Rima Agarwal MD        Dose:  40 mg   Take 1 capsule (40 mg) by mouth daily   Quantity:  90 capsule   Refills:  1            Where to get your medicines      These medications were sent to [facility] - [address], [facility] [address]     Phone:  [phone]     FLUoxetine 40 MG capsule    levothyroxine 100 MCG tablet                Primary Care Provider Office Phone # Fax #    Rima Agarwal [phone] [address] [facility] [address]        Equal Access to Services     [facility]: Hadii leticia ku hadasho Soomaali, waaxda luqadaha, qaybta kaalmada adeegyada, chico mina . So [facility] [phone].    ATENCIÓN: Si habla español, tiene a terrazas disposición servicios gratuitos de asistencia lingüística. Llame al [phone].    We comply with applicable federal civil rights laws and [place] laws. We do not discriminate on the basis of race, color, national origin, age, disability, sex, sexual orientation, or gender identity.            Thank you!     Thank you for choosing [facility]   for your care. Our goal is always to provide you with excellent care. Hearing back from our patients is one way we can continue to improve our services. Please take a few minutes to complete the written survey that you may receive in the mail after your visit with us. Thank you!             Your Updated Medication List - Protect others around you: Learn how to safely use, store and throw away your medicines at www.disposemymeds.org.          This list is accurate as of: 11/21/17 10:30 AM.  Always use your most recent med list.                   Brand Name Dispense Instructions for use Diagnosis    Calcium carb-Vitamin D 500 mg Ramona-200 units 500-200 MG-UNIT per tablet    OSCAL with D;Oyster Shell Calcium     Take 1 tablet by mouth daily        FLUoxetine 40 MG capsule    PROzac    90 capsule    Take 1 capsule (40 mg) by mouth daily    Recurrent major depressive disorder, in partial remission (H)       * levothyroxine 100 MCG tablet    SYNTHROID/LEVOTHROID    90 tablet    Take 1 tablet (100 mcg) by mouth daily    Hypothyroidism due to acquired atrophy of thyroid       * levothyroxine 100 MCG tablet    SYNTHROID/LEVOTHROID    90 tablet    Take 1 tablet (100 mcg) by mouth daily    Acquired hypothyroidism       omeprazole 20 MG CR capsule    priLOSEC    30 capsule    Take 1 capsule (20 mg) by mouth daily    Gastroesophageal reflux disease without esophagitis       VITAMIN C PO      Take 500 mg by mouth daily    Recurrent major depression resistant to treatment (H), OCD (obsessive compulsive disorder), Hypothyroidism due to acquired atrophy of thyroid, Encounter for therapeutic drug monitoring       * Notice:  This list has 2 medication(s) that are the same as other medications prescribed for you. Read the directions carefully, and ask your doctor or other care provider to review them with you.

## 2017-11-21 NOTE — PATIENT INSTRUCTIONS
Preventive Health Recommendations    Female Ages 65 +    Yearly exam:     See your health care provider every year in order to  o Review health changes.   o Discuss preventive care.    o Review your medicines if your doctor has prescribed any.      You no longer need a yearly Pap test unless you've had an abnormal Pap test in the past 10 years. If you have vaginal symptoms, such as bleeding or discharge, be sure to talk with your provider about a Pap test.      Every 1 to 2 years, have a mammogram.  If you are over 69, talk with your health care provider about whether or not you want to continue having screening mammograms.      Every 10 years, have a colonoscopy. Or, have a yearly FIT test (stool test). These exams will check for colon cancer.       Have a cholesterol test every 5 years, or more often if your doctor advises it.       Have a diabetes test (fasting glucose) every three years. If you are at risk for diabetes, you should have this test more often.       At age 65, have a bone density scan (DEXA) to check for osteoporosis (brittle bone disease).    Shots:    Get a flu shot each year.    Get a tetanus shot every 10 years.    Talk to your doctor about your pneumonia vaccines. There are now two you should receive - Pneumovax (PPSV 23) and Prevnar (PCV 13).    Talk to your doctor about the shingles vaccine.    Talk to your doctor about the hepatitis B vaccine.    Nutrition:     Eat at least 5 servings of fruits and vegetables each day.      Eat whole-grain bread, whole-wheat pasta and brown rice instead of white grains and rice.      Talk to your provider about Calcium and Vitamin D.     Lifestyle    Exercise at least 150 minutes a week (30 minutes a day, 5 days a week). This will help you control your weight and prevent disease.      Limit alcohol to one drink per day.      No smoking.       Wear sunscreen to prevent skin cancer.       See your dentist twice a year for an exam and cleaning.      See your  eye doctor every 1 to 2 years to screen for conditions such as glaucoma, macular degeneration and cataracts.        3m futuro energizing wrist splints              Low Back Pain            What is low back pain?   Low back pain is pain and stiffness in the lower back. It is one of the most common reasons people miss work.   How does it occur?   Your lower back is called your lumbar spine. It is made up of 5 bones called lumbar vertebrae. In between the vertebrae are shock absorbers called disks. Back pain can occur from an injury to the vertebrae or when a disk bulges or herniates.   Low back pain is usually caused when a ligament or muscle holding a vertebra in its proper position is strained. Vertebrae are bones that make up the spinal column through which the spinal cord passes. When these muscles or ligaments become weak or strained, the spine loses its stability, resulting in pain.   Low back pain can occur if your job involves lifting and carrying heavy objects, or if you spend a lot of time sitting or standing in one position or bending over. It can be caused by a fall or by unusually strenuous exercise. It can be brought on by the tension and stress that cause headaches in some people. It can even be brought on by violent sneezing or coughing.   People who are overweight may have low back pain because of the added stress on their back.   Back pain may occur when the muscles, joints, bones, and connective tissues of the back become inflamed as a result of an infection or an immune system problem. Arthritic disorders as well as some congenital and degenerative conditions may cause back pain.   Back pain accompanied by loss of bladder or bowel control, trouble moving your legs, or numbness or tingling in your arms or legs requires immediate medical treatment.   What are the symptoms?   Symptoms include:   pain in the back or legs   stiffness, spasm, or limited motion   The pain may be constant or may happen  only in certain positions. It may get worse when you cough, sneeze, bend, twist, or strain during a bowel movement. The pain may be in only one spot or may spread to other areas, most commonly down the buttocks and into the back of the thigh.   A low back strain typically does not produce pain past the knee into the calf or foot. Tingling or numbness in the calf or foot may indicate a herniated disk or pinched nerve.   Be sure to see your healthcare provider if:   You have weakness in your leg, especially if you cannot lift your foot, because this may be a sign of nerve damage.   You have new bowel or bladder problems as well as back pain, which may be a sign of severe injury to your spinal cord.   You have pain that gets worse despite treatment.   How is it diagnosed?   Your healthcare provider will review your medical history and examine you. You may have X-rays, an MRI, CT scan, or a bone scan.   How is it treated?   To treat this condition:   Put an ice pack, gel pack, or package of frozen vegetables, wrapped in a cloth on the area every 3 to 4 hours, for up to 20 minutes at a time for the first 2 or 3 days.   Use a heating pad or hot water bottle. Don't let the heating pad get too hot, and don't fall asleep with it. You could get a burn.   Rest in bed on a firm mattress. Often it helps to lie on your back with your knees raised on a pillow. However, some people prefer to lie on their side with their knees bent. It's best to try to stay active, so try not to rest in bed longer than 1 to 2 days.   Take muscle relaxants as recommended by your healthcare provider.   Take an anti-inflammatory such as ibuprofen, or other medicine as directed by your provider. Nonsteroidal anti-inflammatory medicines (NSAIDs) may cause stomach bleeding and other problems. These risks increase with age. Read the label and take as directed. Unless recommended by your healthcare provider, do not take for more than 10 days.   Get a back  massage by a trained person.   Wear a belt or corset to support your back.   Do the exercises recommended by your provider. Your provider may also prescribe physical therapy.   Talk with a counselor, if your back pain is related to tension caused by emotional problems.   When the pain is gone, ask your healthcare provider about starting an exercise program such as the following:   Exercise moderately every day, using stretching and warm-up exercises suggested by your provider or physical therapist.   Exercise vigorously for about 30 minutes 3 times a week by walking, swimming, using a stationary bicycle, or doing low-impact aerobics.   Exercising regularly will not only help your back, it will also help keep you healthier overall.   How long will the effects last?   The effects of back pain last as long as the cause exists or until your body recovers from the strain, usually a day or two but sometimes weeks.   How can I take care of myself?   In addition to the treatment described above, keep in mind these suggestions:   Practice good posture. Stand with your head up, shoulders straight, chest forward, weight balanced evenly on both feet, and pelvis tucked in.   Lose weight if you are overweight   Keep your core muscles strong. These are your abdominal and back muscles.   Sleep without a pillow under your head.   Pain is the best way to  the pace you should set in increasing your activity and exercise. Minor discomfort, stiffness, soreness, and mild aches need not interfere with activity. However, limit your activities temporarily if:   Your symptoms return.   The pain increases when you are more active.   The pain increases within 24 hours after a new or higher level of activity.   When can I return to my normal activities?   Everyone recovers from an injury at a different rate. Return to your activities depends on how soon your back recovers, not by how many days or weeks it has been since your injury has  occurred. In general, the longer you have symptoms before you start treatment, the longer it will take to get better. The goal is to return to your normal activities as soon as is safely possible. If you return too soon you may worsen your injury.   It is important that you have fully recovered from your low back pain before you return to any strenuous activity. You must be able to have the same range of motion that you had before your injury. You must be able to walk and twist without pain.   What can I do to help prevent low back pain?   You can reduce the strain on your back by doing the following:   Don't push with your arms when you move a heavy object. Turn around and push backwards so the strain is taken by your legs.   Whenever you sit, sit in a straight-backed chair and hold your spine against the back of the chair.   Bend your knees and hips and keep your back straight when you lift a heavy object.   Avoid lifting heavy objects higher than your waist.   Hold packages you carry close to your body, with your arms bent.   Use a footrest for one foot when you stand or sit in one spot for a long time. This keeps your back straight.   Bend your knees when you bend over.   Sit close to the pedals when you drive and use your seat belt and a hard backrest or pillow.   Lie on your side with your knees bent when you sleep or rest. It may help to put a pillow between your knees.   Put a pillow under your knees when you sleep on your back.   Raise the foot of the bed 8 inches to discourage sleeping on your stomach unless you have other problems that require that you keep your head elevated.   To rest your back, hold each of these positions for 5?minutes or longer:   Lie on your back, bend your knees, and put pillows under your knees.   Lie on your back on the floor with a pillow under your neck. Bend your knees to a 90-degree angle, and put your lower legs and feet on a chair.   Lie on your back, bend your knees, and  bring one knee up to your chest and hold it there. Repeat with the other knee, then bring both knees to your chest. When holding your knee to your chest, grab your thigh rather than your lower leg to avoid over flexing your knee.     Published by [facility].  This content is reviewed periodically and is subject to change as new health information becomes available. The information is intended to inform and educate and is not a replacement for medical evaluation, advice, diagnosis or treatment by a healthcare professional.   Developed by Chyna Meade RN, [place], and [facility].   ? 2010 [facility] and/or its affiliates. All Rights Reserved.           Low Back Pain Exercise          Standing hamstring stretch: Put the heel of one leg on a stool about 15 inches high. Keep your leg straight. Lean forward, bending at the hips until you feel a mild stretch in the back of your thigh. Make sure you do not roll your shoulders or bend at the waist when doing this. You want to stretch your leg, not your lower back. Hold the stretch for 15 to 30 seconds. Repeat with each leg 3 times.   Cat and camel: Get down on your hands and knees. Let your stomach sag, allowing your back to curve downward. Hold this position for 5 seconds. Then arch your back and hold for 5 seconds. Do 3 sets of 10.   Quadruped arm and leg raise: Get down on your hands and knees. Pull in your belly button and tighten your abdominal muscles to stiffen your spine. While keeping your abdominals tight, raise one arm and the opposite leg away from you. Hold this position for 5 seconds. Lower your arm and leg slowly and change sides. Do this 10 times on each side.   Pelvic tilt: Lie on your back with your knees bent and your feet flat on the floor. Tighten your abdominal muscles and push your lower back into the floor. Hold this position for 5 seconds, then relax. Do 3 sets of 10.   Partial curl: Lie on your back with your knees bent and your feet flat on the  floor. Tighten your stomach muscles. Tuck your chin to your chest. With your hands stretched out in front of you, curl your upper body forward until your shoulders clear the floor. Hold this position for 3 seconds. Don't hold your breath. It helps to breathe out as you lift your shoulders up. Relax back to the floor. Repeat 10 times. Build to 3 sets of 10. To challenge yourself, clasp your hands behind your head and keep your elbows out to the side.   Gluteal stretch: Lie on your back with both knees bent. Rest the ankle of one leg over the knee of your other leg. Grasp the thigh of the bottom leg and pull toward your chest. You will feel a stretch along the buttocks and possibly along the outside of your hip. Hold the stretch for 15 to 30 seconds. Repeat 3 times with each leg.   Extension exercise:   0. Lie face down on the floor for 5 minutes. If this hurts too much, lie face down with a pillow under your stomach. This should relieve your leg or back pain. When you can lie on your stomach for 5 minutes without a pillow, you can continue with Part B of this exercise.   0. After lying on your stomach for 5 minutes, prop yourself up on your elbows for another 5 minutes. If you can do this without having more leg or buttock pain, you can start doing part C of this exercise.   0. Lie on your stomach with your hands under your shoulders. Then press down on your hands and extend your elbows while keeping your hips flat on the floor. Hold for 1 second and lower yourself to the floor. Do 3 to 5 sets of 10 repetitions. Rest for 1 minute between sets. You should have no pain in your legs when you do this, but it is normal to feel some pain in your lower back.   Do this exercise several times a day.   Side plank: Lie on your side with your legs, hips, and shoulders in a straight line. Prop yourself up onto your forearm so your elbow is directly under your shoulder. Lift your hips off the floor and balance on your forearm and  the outside of your foot. Try to hold this position for 15 seconds, then slowly lower your hip to the ground. Switch sides and repeat. Work up to holding for 1 minute or longer. This exercise can be made easier by starting with your knees and hips flexed toward your chest.   Published by [employer].  This content is reviewed periodically and is subject to change as new health information becomes available. The information is intended to inform and educate and is not a replacement for medical evaluation, advice, diagnosis or treatment by a healthcare professional.   Written by Neela Fletcher, MS, PT, and Chyna Chairez, PT, [facility], [facility], for [facility]   ? 2010 [facility] and/or its affiliates. All Rights Reserved.         Copyright   [employer] 2011              Exercises to Strengthen Your Lower Back  Strong lower back and abdominal muscles work together to support your spine. The exercises below will help strengthen the lower back. It is important that you begin exercising slowly and increase levels gradually.  Always begin any exercise program with stretching. If you feel pain while doing any of these exercises, stop and talk to your doctor about a more specific exercise program that better suits your condition.   Low back stretch  The point of stretching is to make you more flexible and increase your range of motion. Stretch only as much as you are able. Stretch slowly. Do not push your stretch to the limit. If at any point you feel pain while stretching, this is your (temporary) limit.    Lie on your back with your knees bent and both feet on the ground.    Slowly raise your left knee to your chest as you flatten your lower back against the floor. Hold for 5 seconds.    Relax and repeat the exercise with your right knee.    Do 10 of these exercises for each leg.    Repeat hugging both knees to your chest at the same time.  Building lower back strength  Start your exercise routine with 10 to 30  minutes a day, 1 to 3 times a day.  Initial exercises  Lying on your back:  1. Ankle pumps: Move your foot up and down, towards your head, and then away. Repeat 10 times with each foot.  2. Heel slides: Slowly bend your knee, drawing the heel of your foot towards you. Then slide your heel/foot from you, straightening your knee. Do not lift your foot off the floor (this is not a leg lift).  3. Abdominal contraction: Bend your knees and put your hands on your stomach. Tighten your stomach muscles. Hold for 5 seconds, then relax. Repeat 10 times.  4. Straight leg raise: Bend one leg at the knee and keep the other leg straight. Tighten your stomach muscles. Slowly lift your straight leg 6 to 12 inches off the floor and hold for up to 5 seconds. Repeat 10 times on each side.  Standin. Wall squats: Stand with your back against the wall. Move your feet about 12 inches away from the wall. Tighten your stomach muscles, and slowly bend your knees until they are at about a 45 degree angle. Do not go down too far. Hold about 5 seconds. Then slowly return to your starting position. Repeat 10 times.  2. Heel raises: Stand facing the wall. Slowly raise the heels of your feet up and down, while keeping your toes on the floor. If you have trouble balancing, you can touch the wall with your hands. Repeat 10 times.  More advanced exercises  When you feel comfortable enough, try these exercises.  1. Kneeling lumbar extension: Begin on your hands and knees. At the same time, raise and straighten your right arm and left leg until they are parallel to the ground. Hold for 2 seconds and come back slowly to a starting position. Repeat with left arm and right leg, alternating 10 times.  2. Prone lumbar extension: Lie face down, arms extended overhead, palms on the floor. At the same time, raise your right arm and left leg as high as comfortably possible. Hold for 10 seconds and slowly return to start. Repeat with left arm and right leg,  alternating 10 times. Gradually build up to 20 times. (Advanced: Repeat this exercise raising both arms and both legs a few inches off the floor at the same time. Hold for 5 seconds and release.)  3. Pelvic tilt: Lie on the floor on your back with your knees bent at 90 degrees. Your feet should be flat on the floor. Inhale, exhale, then slowly contract your abdominal muscles bringing your navel toward your spine. Let your pelvis rock back until your lower back is flat on the floor. Hold for 10 seconds while breathing smoothly.  4. Abdominal crunch: Perform a pelvic tilt (above) flattening your lower back against the floor. Holding the tension in your abdominal muscles, take another breath and raise your shoulder blades off the ground (this is not a full sit-up). Keep your head in line with your body (don t bend your neck forward). Hold for 2 seconds, then slowly lower.  Date Last Reviewed: 6/1/2016 2000-2017 [employer]. [address] [place] [address], [place] [address]. All rights reserved. This information is not intended as a substitute for professional medical care. Always follow your healthcare professional's instructions.

## 2017-11-21 NOTE — NURSING NOTE
"Chief Complaint   Patient presents with     Physical       Initial LMP 02/24/2017 (Exact Date) Estimated body mass index is 23.49 kg/(m^2) as calculated from the following:    Height as of 4/4/17: 5' 7\" (1.702 m).    Weight as of 4/4/17: 150 lb (68 kg).  Medication Reconciliation: complete     Falguni Santamaria CMA   "

## 2017-12-01 ENCOUNTER — THERAPY VISIT (OUTPATIENT)
Dept: PHYSICAL THERAPY | Facility: CLINIC | Age: 67
End: 2017-12-01
Payer: MEDICARE

## 2017-12-01 DIAGNOSIS — M54.9 BACK PAIN: Primary | ICD-10-CM

## 2017-12-01 PROCEDURE — 97140 MANUAL THERAPY 1/> REGIONS: CPT | Mod: GP | Performed by: PHYSICAL THERAPIST

## 2017-12-01 PROCEDURE — G8979 MOBILITY GOAL STATUS: HCPCS | Mod: GP | Performed by: PHYSICAL THERAPIST

## 2017-12-01 PROCEDURE — G8978 MOBILITY CURRENT STATUS: HCPCS | Mod: GP | Performed by: PHYSICAL THERAPIST

## 2017-12-01 PROCEDURE — 97161 PT EVAL LOW COMPLEX 20 MIN: CPT | Mod: GP | Performed by: PHYSICAL THERAPIST

## 2017-12-01 PROCEDURE — 97110 THERAPEUTIC EXERCISES: CPT | Mod: GP | Performed by: PHYSICAL THERAPIST

## 2017-12-01 NOTE — LETTER
DEPARTMENT OF HEALTH AND HUMAN SERVICES  CENTERS FOR MEDICARE & MEDICAID SERVICES    PLAN/UPDATED PLAN OF PROGRESS FOR OUTPATIENT REHABILITATION    PATIENTS NAME:  Ellie Lewis     : 1950    PROVIDER NUMBER:    [id number]    [id number]      PROVIDER NAME: [facility]    MEDICAL RECORD NUMBER: [id number]     START OF CARE DATE:    2017  TYPE:  PT    PRIMARY/TREATMENT DIAGNOSIS: (Pertinent Medical Diagnosis)  Back pain    VISITS FROM START OF CARE:  Rxs Used: 1     Subjective:  Patient is a 67 year old female presenting with rehab back hpi. The history is provided by the patient. No  was used.   Ellie Lewis is a 67 year old female with a lumbar condition.  Condition occurred with:  Insidious onset.  Condition occurred: for unknown reasons.  This is a new condition  Several months.    Patient reports pain:  Upper lumbar spine.    Pain is described as aching  and reported as 3/10 and 8/10.  Associated symptoms:  Loss of motion and loss of motion/stiffness (urgency and some incontinence related to age). Pain is worse in the P.M..  Symptoms are exacerbated by twisting, lifting, carrying, standing and sitting (prolonged standing and sitting increase pain) and relieved by heat.  Since onset symptoms are gradually worsening.  Special tests:  X-ray (arthritis).      General health as reported by patient is good.  Pertinent medical history includes:  Depression and mental illness.  Medical allergies: yes (sulfa).  Other surgeries include:  Orthopedic surgery (r NATASHA).    Current occupation is retired.  Employment status: retired.  Barriers include:  None as reported by the patient  Red flags:  None as reported by the patient.     Objective:  Standing Alignment:    Lumbar:  Lordosis decr  Pelvic:  Iliac crest high L, PSIS high L and ASIS high R (pt is flexed at waist)       Lumbar/SI Evaluation  ROM:    AROM Lumbar:   Flexion:          75%  Ext:                     15%   Side Bend:        Left:  50%    Right:  50%  Rotation:           Left:     Right:   Side Glide:        Left:     Right:   Strength: weak transverse abdominals  PATIENTS NAME:  Ellie Lewis     : 1950      Lumbar Myotomes:  not assessed  Lumbar DTR's:  normal  Cord Signs:  not assessed    Lumbar Dermtomes:  not assessed  Neural Tension/Mobility:  Lumbar:  Normal    Lumbar Palpation:    Tenderness present at Left:    Quadratus Lumborum and Erector Spinae  Tenderness present at Right: Quadratus Lumborum and Erector Spinae      Spinal Segmental Conclusions:   Level: FRS right noted at L1 and L2      SI joint/Sacrum:      Left positive at:    Forward bend standing  Sacral conclusion left:  Anterior inominate       Assessment/Plan:      Patient is a 67 year old female with lumbar complaints.    Patient has the following significant findings with corresponding treatment plan.                Diagnosis 1:  Low back pain  Pain -  self management  Decreased ROM/flexibility - manual therapy and therapeutic exercise  Decreased strength - therapeutic exercise and therapeutic activities    Therapy Evaluation Codes:   1) History comprised of:   Personal factors that impact the plan of care:      Age.    Comorbidity factors that impact the plan of care are:      Osteoarthritis.     Medications impacting care: Anti-depressant.  2) Examination of Body Systems comprised of:   Body structures and functions that impact the plan of care:      Lumbar spine.   Activity limitations that impact the plan of care are:      Sitting and Walking.  3) Clinical presentation characteristics are:   Stable/Uncomplicated.  4) Decision-Making    Low complexity using standardized patient assessment instrument and/or measureable assessment of functional outcome.  Cumulative Therapy Evaluation is: Low complexity.    Previous and current functional limitations:  (See Goal Flow Sheet for this information)    Short term and Long term  "goals: (See Goal Flow Sheet for this information)   PATIENTS NAME:  Ellie Lewis     : 1950        Communication ability:  Patient appears to be able to clearly communicate and understand verbal and written communication and follow directions correctly.  Treatment Explanation - The following has been discussed with the patient:   RX ordered/plan of care  Anticipated outcomes  Possible risks and side effects  This patient would benefit from PT intervention to resume normal activities.   Rehab potential is excellent.    Frequency:  1 X week, once daily  Duration:  for 6 weeks  Discharge Plan:  Achieve all LTG.  Independent in home treatment program.  Reach maximal therapeutic benefit.               Caregiver Signature/Credentials _____________________________ Date ________       Treating Provider: Lilibeth Sanchez, PT   I have reviewed and certified the need for these services and plan of treatment while under my care.        PHYSICIAN'S SIGNATURE:   ______________________________________ Date___________     Rima Agarwal MD    Certification period: Beginning of Cert date period 2017  to   2018 to End of Cert period date     Functional Level Progress Report: Please see attached \"Goal Flow sheet for Functional level.\"    ____X____ Continue Services or       ________ DC Services                Service dates: From    date to present                         "

## 2017-12-01 NOTE — PROGRESS NOTES
Subjective:    Patient is a 67 year old female presenting with rehab back hpi. The history is provided by the patient. No  was used.   Ellie Lewis is a 67 year old female with a lumbar condition.  Condition occurred with:  Insidious onset.  Condition occurred: for unknown reasons.  This is a new condition  Several months.    Patient reports pain:  Upper lumbar spine.    Pain is described as aching  and reported as 3/10 and 8/10.  Associated symptoms:  Loss of motion and loss of motion/stiffness (urgency and some incontinence related to age). Pain is worse in the P.M..  Symptoms are exacerbated by twisting, lifting, carrying, standing and sitting (prolonged standing and sitting increase pain) and relieved by heat.  Since onset symptoms are gradually worsening.  Special tests:  X-ray (arthritis).      General health as reported by patient is good.  Pertinent medical history includes:  Depression and mental illness.  Medical allergies: yes (sulfa).  Other surgeries include:  Orthopedic surgery (r NATASHA).    Current occupation is retired.  Employment status: retired.      Barriers include:  None as reported by the patient.    Red flags:  None as reported by the patient.                        Objective:    Standing Alignment:        Lumbar:  Lordosis decr  Pelvic:  Iliac crest high L, PSIS high L and ASIS high R (pt is flexed at waist)                         Lumbar/SI Evaluation  ROM:    AROM Lumbar:   Flexion:          75%  Ext:                    15%   Side Bend:        Left:  50%    Right:  50%  Rotation:           Left:     Right:   Side Glide:        Left:     Right:         Strength: weak transverse abdominals  Lumbar Myotomes:  not assessed            Lumbar DTR's:  normal      Cord Signs:  not assessed    Lumbar Dermtomes:  not assessed                Neural Tension/Mobility:  Lumbar:  Normal        Lumbar Palpation:    Tenderness present at Left:    Quadratus Lumborum and Erector  Spinae  Tenderness present at Right: Quadratus Lumborum and Erector Spinae      Spinal Segmental Conclusions:     Level: FRS right noted at L1 and L2      SI joint/Sacrum:        Left positive at:    Forward bend standing    Sacral conclusion left:  Anterior inominate                                               General     ROS    Assessment/Plan:      Patient is a 67 year old female with lumbar complaints.    Patient has the following significant findings with corresponding treatment plan.                Diagnosis 1:  Low back pain  Pain -  self management  Decreased ROM/flexibility - manual therapy and therapeutic exercise  Decreased strength - therapeutic exercise and therapeutic activities    Therapy Evaluation Codes:   1) History comprised of:   Personal factors that impact the plan of care:      Age.    Comorbidity factors that impact the plan of care are:      Osteoarthritis.     Medications impacting care: Anti-depressant.  2) Examination of Body Systems comprised of:   Body structures and functions that impact the plan of care:      Lumbar spine.   Activity limitations that impact the plan of care are:      Sitting and Walking.  3) Clinical presentation characteristics are:   Stable/Uncomplicated.  4) Decision-Making    Low complexity using standardized patient assessment instrument and/or measureable assessment of functional outcome.  Cumulative Therapy Evaluation is: Low complexity.    Previous and current functional limitations:  (See Goal Flow Sheet for this information)    Short term and Long term goals: (See Goal Flow Sheet for this information)     Communication ability:  Patient appears to be able to clearly communicate and understand verbal and written communication and follow directions correctly.  Treatment Explanation - The following has been discussed with the patient:   RX ordered/plan of care  Anticipated outcomes  Possible risks and side effects  This patient would benefit from PT  intervention to resume normal activities.   Rehab potential is excellent.    Frequency:  1 X week, once daily  Duration:  for 6 weeks  Discharge Plan:  Achieve all LTG.  Independent in home treatment program.  Reach maximal therapeutic benefit.    Please refer to the daily flowsheet for treatment today, total treatment time and time spent performing 1:1 timed codes.

## 2017-12-01 NOTE — MR AVS SNAPSHOT
After Visit Summary   12/1/2017    Ellie Lewis    MRN: [id number]           Patient Information     Date Of Birth          1950        Visit Information        Provider Department      12/1/2017 9:30 AM Lilibeth Sanchez, PT [facility]        Today's Diagnoses     Back pain    -  1       Follow-ups after your visit        Your next 10 appointments already scheduled     Dec 08, 2017 10:10 AM CST   RAD[facility] Spine with Ceci Pressley [facility] ([facility])    [address]  [facility] [address] [phone]            Dec 15, 2017 10:10 AM CST   RAD[facility] Spine with Ceci Pressley [facility] ([facility])    [address]  [facility] [address] [phone]            Dec 22, 2017 10:10 AM CST   RAD[facility] Spine with Ceci Pressley [facility] ([facility])    [address]  [facility] [address] [phone]            Dec 29, 2017 10:10 AM CST   RAD[facility] Spine with Ceci Pressley [facility] ([facility])    [address]  [facility] [address] [phone]              Who to contact     If you have questions or need follow up information about today's clinic visit or your schedule please contact [facility] directly at [phone].  Normal or non-critical lab and imaging results will be communicated to you by MyChart, letter or phone within 4 business days after the clinic has received the results. If you do not hear from us within 7 days, please contact the clinic through [employer] or phone. If you have a critical or abnormal lab result, we will notify you by phone as soon as possible.  Submit refill requests through [employer] or call your pharmacy and they will forward the refill request to us. Please allow 3 business days for your refill to be completed.          Additional Information About Your Visit        [employer] Information     [employer] lets you send  "messages to your doctor, view your test results, renew your prescriptions, schedule appointments and more. To sign up, go to [email or web address] . Click on \"Log in\" on the left side of the screen, which will take you to the Welcome page. Then click on \"Sign up Now\" on the right side of the page.     You will be asked to enter the access code listed below, as well as some personal information. Please follow the directions to create your username and password.     Your access code is: [email or web address]  Expires: 2018 10:26 AM     Your access code will  in 90 days. If you need help or a new code, please call your [place] clinic or [phone].        Care EveryWhere ID     This is your Care EveryWhere ID. This could be used by other organizations to access your [place] medical records  [id number]         Blood Pressure from Last 3 Encounters:   17 114/66   17 117/70   17 118/77    Weight from Last 3 Encounters:   17 79.1 kg (174 lb 4.8 oz)   17 78.9 kg (174 lb)   17 79.2 kg (174 lb 9.6 oz)              We Performed the Following     HC PT EVAL, LOW COMPLEXITY     RADHA INITIAL EVAL REPORT     MANUAL THER TECH,1+REGIONS,EA 15 MIN     THERAPEUTIC EXERCISES        Primary Care Provider Office Phone # Fax #    Rima Agarwal [phone] [address] [facility] [address]        Equal Access to Services     [facility] AH: Hadii leticia ku hadasho Soomaali, waaxda luqadaha, qaybta kaalmada anna marie, chico salazar. So [facility] [phone].    ATENCIÓN: Si habla español, tiene a terrazas disposición servicios gratuitos de asistencia lingüística. Llame al [phone].    We comply with applicable federal civil rights laws and [place] laws. We do not discriminate on the basis of race, color, national origin, age, disability, sex, sexual orientation, or gender identity.            Thank you!     Thank you for choosing [facility]  for your care. Our goal is always to provide you with excellent care. Hearing back from our patients is one way we can continue to improve our services. Please take a few minutes to complete the written survey that you may receive in the mail after your visit with us. Thank you!             Your Updated Medication List - Protect others around you: Learn how to safely use, store and throw away your medicines at www.disposemymeds.org.          This list is accurate as of: 12/1/17  1:55 PM.  Always use your most recent med list.                   Brand Name Dispense Instructions for use Diagnosis    Calcium carb-Vitamin D 500 mg [place]-200 units 500-200 MG-UNIT per tablet    OSCAL with D;Oyster Shell Calcium     Take 1 tablet by mouth daily        FLUoxetine 40 MG capsule    PROzac    90 capsule    Take 1 capsule (40 mg) by mouth daily    Recurrent major depressive disorder, in partial remission (H)       * levothyroxine 100 MCG tablet    SYNTHROID/LEVOTHROID    90 tablet    Take 1 tablet (100 mcg) by mouth daily    Hypothyroidism due to acquired atrophy of thyroid       * levothyroxine 100 MCG tablet    SYNTHROID/LEVOTHROID    90 tablet    Take 1 tablet (100 mcg) by mouth daily    Acquired hypothyroidism       omeprazole 20 MG CR capsule    priLOSEC    30 capsule    Take 1 capsule (20 mg) by mouth daily    Gastroesophageal reflux disease without esophagitis       VITAMIN C PO      Take 500 mg by mouth daily    Recurrent major depression resistant to treatment (H), OCD (obsessive compulsive disorder), Hypothyroidism due to acquired atrophy of thyroid, Encounter for therapeutic drug monitoring       * Notice:  This list has 2 medication(s) that are the same as other medications prescribed for you. Read the directions carefully, and ask your doctor or other care provider to review them with you.

## 2017-12-14 ENCOUNTER — OFFICE VISIT (OUTPATIENT)
Dept: FAMILY MEDICINE | Facility: CLINIC | Age: 67
End: 2017-12-14
Payer: MEDICARE

## 2017-12-14 VITALS
DIASTOLIC BLOOD PRESSURE: 70 MMHG | TEMPERATURE: 98.6 F | BODY MASS INDEX: 29.49 KG/M2 | OXYGEN SATURATION: 97 % | HEIGHT: 65 IN | WEIGHT: 177 LBS | HEART RATE: 81 BPM | SYSTOLIC BLOOD PRESSURE: 118 MMHG

## 2017-12-14 DIAGNOSIS — J01.01 ACUTE RECURRENT MAXILLARY SINUSITIS: Primary | ICD-10-CM

## 2017-12-14 PROCEDURE — 99213 OFFICE O/P EST LOW 20 MIN: CPT | Performed by: FAMILY MEDICINE

## 2017-12-14 NOTE — MR AVS SNAPSHOT
After Visit Summary   12/14/2017    Ellie Lewis    MRN: [id number]           Patient Information     Date Of Birth          1950        Visit Information        Provider Department      12/14/2017 11:00 AM Rima Agarwal MD [facility]        Today's Diagnoses     Acute recurrent maxillary sinusitis    -  1      Care Instructions    Sinus pressure is primarily a problem of drainage.  You can best help your body clear the sinus secretions and pressure by opening up the natural passageways which are often blocked by viral colds and allergies.      Short courses of a nasal decongestant spray (Afrin or Neosinephrine) are one of the most effective tools in opening sinus drainage passageways.  Their use should be restricted to 3 days though due to the high risk of nasal addiction.  Pseudoephedrine or phenylephrine (Sudafed) is often helpful but it can cause elevations in blood pressure and insomnia.     Sometimes a nasal saline spray will help rinse out the nasal passages and feel good.     Guaifenesin (Robitussin or Mucinex) helps loosen secretions and often help make the mucous more liquid and easier to clear.    For pain and fevers, acetaminophen (Tylenol) is most appropriate.  Ibuprofen (Advil) or naproxen (Aleve) are useful too and last longer but they can cause elevation of blood pressure or stomach problems.    Antihistamines (Benadryl, Dimetapp, etc.) cause sedation, confusion, bowel and urinary abnormalities and are of little use for infectious causes of cough and nasal congestion.  Their use should be reserved for allergic symptoms.    The body needs to be treated well in order to help heal itself.  Rest as needed.  It is ok to reduce food intake if appetite is poor but it is quite important to maintain/increase fluid intake.  Sinus pressure and infections usually go away on their own with appropriate care.  If symptoms worsen or persist beyond 10 days, an antibiotic might be  "worth trying to treat a possible bacterial component.            Follow-ups after your visit        Your next 10 appointments already scheduled     Dec 22, 2017 10:10 AM CST   RADHA Spine with Ceci Pressley, PT   [facility] ([facility])    [address]  [facility] [id number] [phone]            Dec 29, 2017 10:10 AM CST   RADHA Spine with Ceci Pressley PT   [facility] ([facility])    [address]  [facility] [address] [phone]              Who to contact     Normal or non-critical lab and imaging results will be communicated to you by [employer], letter or phone within 4 business days after the clinic has received the results. If you do not hear from us within 7 days, please contact the clinic through [employer] or phone. If you have a critical or abnormal lab result, we will notify you by phone as soon as possible.  Submit refill requests through [employer] or call your pharmacy and they will forward the refill request to us. Please allow 3 business days for your refill to be completed.          If you need to speak with a  for additional information , please call: [phone]             Additional Information About Your Visit        [employer] Information     [employer] lets you send messages to your doctor, view your test results, renew your prescriptions, schedule appointments and more. To sign up, go to [email or web address] . Click on \"Log in\" on the left side of the screen, which will take you to the Welcome page. Then click on \"Sign up Now\" on the right side of the page.     You will be asked to enter the access code listed below, as well as some personal information. Please follow the directions to create your username and password.     Your access code is: [email or web address]  Expires: 2018 10:26 AM     Your access code will  in 90 days. If you need help or a new code, please call your [place] clinic or [phone].        Care " "EveryWhere ID     This is your Care EveryWhere ID. This could be used by other organizations to access your [place] medical records  [id number]        Your Vitals Were     Pulse Temperature Height Pulse Oximetry BMI (Body Mass Index)       81 98.6  F (37  C) (Tympanic) 5' 5\" (1.651 m) 97% 29.45 kg/m2        Blood Pressure from Last 3 Encounters:   12/14/17 118/70   11/21/17 114/66   08/14/17 117/70    Weight from Last 3 Encounters:   12/14/17 177 lb (80.3 kg)   11/21/17 174 lb 4.8 oz (79.1 kg)   08/14/17 174 lb (78.9 kg)              Today, you had the following     No orders found for display         Today's Medication Changes          These changes are accurate as of: 12/14/17 11:30 AM.  If you have any questions, ask your nurse or doctor.               Start taking these medicines.        Dose/Directions    amoxicillin-clavulanate 875-125 MG per tablet   Commonly known as:  AUGMENTIN   Used for:  Acute recurrent maxillary sinusitis   Started by:  Rima Agarwal MD        Dose:  1 tablet   Take 1 tablet by mouth 2 times daily   Quantity:  20 tablet   Refills:  0            Where to get your medicines      Some of these will need a paper prescription and others can be bought over the counter.  Ask your nurse if you have questions.     Bring a paper prescription for each of these medications     amoxicillin-clavulanate 875-125 MG per tablet                Primary Care Provider Office Phone # Fax #    Rima Agarwal [phone] [address] [facility] [address]        Equal Access to Services     [facility] AH: Hadii leticia lucaso Sogui, waaxda luqadaha, qaybta kaalmada adeegyada, waxdoretha idishirley salazar. So [facility] [phone].    ATENCIÓN: Si habla español, tiene a terrazas disposición servicios gratuitos de asistencia lingüística. Llame al [phone].    We comply with applicable federal civil rights laws and [place] laws. We do not discriminate on the basis of " race, color, national origin, age, disability, sex, sexual orientation, or gender identity.            Thank you!     Thank you for choosing [facility]  for your care. Our goal is always to provide you with excellent care. Hearing back from our patients is one way we can continue to improve our services. Please take a few minutes to complete the written survey that you may receive in the mail after your visit with us. Thank you!             Your Updated Medication List - Protect others around you: Learn how to safely use, store and throw away your medicines at www.disposemymeds.org.          This list is accurate as of: 12/14/17 11:30 AM.  Always use your most recent med list.                   Brand Name Dispense Instructions for use Diagnosis    amoxicillin-clavulanate 875-125 MG per tablet    AUGMENTIN    20 tablet    Take 1 tablet by mouth 2 times daily    Acute recurrent maxillary sinusitis       FLUoxetine 40 MG capsule    PROzac    90 capsule    Take 1 capsule (40 mg) by mouth daily    Recurrent major depressive disorder, in partial remission (H)       levothyroxine 100 MCG tablet    SYNTHROID/LEVOTHROID    90 tablet    Take 1 tablet (100 mcg) by mouth daily    Acquired hypothyroidism

## 2017-12-14 NOTE — PROGRESS NOTES
"SUBJECTIVE:  Ellie Lewis is a 67 year old female who presents with the following concerns;              Symptoms: cc Present Absent Comment   Fever/Chills  x     Fatigue  x     Headache x      Muscle or Body  Aches   x    Eye Irritation  x  tired   Sneezing       Nasal Emeterio/Drg x      Sinus Pressure/Pain   x    Dental pain  x     Sore Throat   x    Swollen Glands   x    Ear Pain/Fullness   x    Cough  x     Wheeze   x    Chest Discomfort   x    Shortness of breath   x    Abdominal pain   x    Emesis    x    Diarrhea   x    Other   x      Symptom duration:  1 week   Symptom severity:     Treatments tried:  no   Contacts:  yes   As above   Cough the drainage mucous and worse instead of better   Stuck in the head causing pain   A little bit of bloody discharge   No high fever at home feeling more of the chills   Just hasn't gone away   Started with the nasal congestion   PMH  Patient Active Problem List   Diagnosis     Hypothyroidism due to acquired atrophy of thyroid     Recurrent major depression resistant to treatment (H)     OCD (obsessive compulsive disorder)     Depression     Status post total replacement of right hip     Advanced care planning/counseling discussion     Recurrent major depressive disorder, in partial remission (H)     Back pain     ROS: Constitutional, HEENT, cardiovascular, respiratory, GI, , and skin are otherwise negative except as noted above.    PHYSICAL EXAM:    /70 (BP Location: Right arm, Patient Position: Chair, Cuff Size: Adult Regular)  Pulse 81  Temp 98.6  F (37  C) (Tympanic)  Ht 5' 5\" (1.651 m)  Wt 177 lb (80.3 kg)  SpO2 97%  BMI 29.45 kg/m2 rr 14   GENERAL: Active, alert and no distress.  EYES: PERRL/EOMI.  Bilateral sclera/conjunctiva clear.  HEENT: Audible congestion with purulent post  nasal discharge.  TMs gray and translucent.  Oral mucosa moist and pink.  Posterior pharynx without increased erythema. Uvula midline. Right sided frontal sinus tenderness "   NECK: Supple with full range of motion.  Bilateral shotty anterior cervical nodes.  CV: Regular rate and rhythm without murmur.  LUNGS: Clear to auscultation.  ABD: Soft, nontender, nondistended. No HSM or masses palpated.  SKIN:  No rash. Warm, pink. Capillary refill less than 2 seconds.  1. Acute recurrent maxillary sinusitis  See patient info   - amoxicillin-clavulanate (AUGMENTIN) 875-125 MG per tablet; Take 1 tablet by mouth 2 times daily  Dispense: 20 tablet; Refill: 0    Rima Agarwal M.D.  [facility]

## 2017-12-29 ENCOUNTER — THERAPY VISIT (OUTPATIENT)
Dept: PHYSICAL THERAPY | Facility: CLINIC | Age: 67
End: 2017-12-29
Payer: MEDICARE

## 2017-12-29 DIAGNOSIS — M54.9 BACK PAIN: ICD-10-CM

## 2017-12-29 PROCEDURE — 97110 THERAPEUTIC EXERCISES: CPT | Mod: GP | Performed by: PHYSICAL THERAPIST

## 2017-12-29 PROCEDURE — 97140 MANUAL THERAPY 1/> REGIONS: CPT | Mod: GP | Performed by: PHYSICAL THERAPIST

## 2018-01-15 PROBLEM — M54.9 BACK PAIN: Status: RESOLVED | Noted: 2017-12-01 | Resolved: 2018-01-15

## 2018-01-22 ENCOUNTER — TELEPHONE (OUTPATIENT)
Dept: FAMILY MEDICINE | Facility: CLINIC | Age: 68
End: 2018-01-22

## 2018-01-22 NOTE — TELEPHONE ENCOUNTER
Patient called and needs a referral for an ophthalmologist for a hemorrage in her eye.    Holly Llanos, [facility]

## 2018-01-22 NOTE — TELEPHONE ENCOUNTER
Please call patient. Does she just have some blood in the white part of her eye? Or did she go to the emergency room and was she diagnosed with a retinal hemorrhage. She will need to see an ophthalmologist if she has a retinal hemorrhage and if she has just the superficial redness in the white part of her eye she can be seen here or at an optometrist. Rima Agarwal M.D.

## 2018-01-22 NOTE — TELEPHONE ENCOUNTER
Is there someone particular that she is seeing or wanted to see or is she willing to go anywhere?   Edith

## 2018-01-23 NOTE — TELEPHONE ENCOUNTER
Patient reports that it is clearing up. Denies pain. Said that the white part was all red but the redness is going away. Reports her vision is good. Does not want referral as she is getting better.  Vamsi Zhao RN

## 2018-04-06 DIAGNOSIS — E03.9 ACQUIRED HYPOTHYROIDISM: ICD-10-CM

## 2018-04-06 RX ORDER — LEVOTHYROXINE SODIUM 100 UG/1
TABLET ORAL
Qty: 90 TABLET | Refills: 0 | Status: SHIPPED | OUTPATIENT
Start: 2018-04-06 | End: 2018-06-25

## 2018-04-06 NOTE — TELEPHONE ENCOUNTER
"Requested Prescriptions   Pending Prescriptions Disp Refills     levothyroxine (SYNTHROID/LEVOTHROID) 100 MCG tablet [Pharmacy Med Name: LEVOTHYROXINE  0.1MG  TAB] 90 tablet     Last Written Prescription Date:  11/21/17  Last Fill Quantity: 90,  # refills: 1   Last office visit: 12/14/2017 with prescribing provider:  12/14/17   Future Office Visit:     Sig: TAKE 1 TABLET BY MOUTH  DAILY    Thyroid Protocol Passed    4/6/2018  2:29 PM       Passed - Patient is 12 years or older       Passed - Recent (12 mo) or future (30 days) visit within the authorizing provider's specialty    Patient had office visit in the last 12 months or has a visit in the next 30 days with authorizing provider or within the authorizing provider's specialty.  See \"Patient Info\" tab in inbasket, or \"Choose Columns\" in Meds & Orders section of the refill encounter.           Passed - Normal TSH on file in past 12 months    Recent Labs   Lab Test  08/14/17   1015   TSH  0.71             Passed - No active pregnancy on record    If patient is pregnant or has had a positive pregnancy test, please check TSH.         Passed - No positive pregnancy test in past 12 months    If patient is pregnant or has had a positive pregnancy test, please check TSH.            "

## 2018-04-06 NOTE — TELEPHONE ENCOUNTER
Prescription for levothyroxine approved per [facility] Refill Protocol.    Miriam YOUNGER RN

## 2018-04-30 DIAGNOSIS — F33.41 RECURRENT MAJOR DEPRESSIVE DISORDER, IN PARTIAL REMISSION (H): ICD-10-CM

## 2018-04-30 NOTE — TELEPHONE ENCOUNTER
"FLUOXETINE 40MG CAP        Last Written Prescription Date:  11/21/17  Last Fill Quantity: 90,   # refills: 1  Last Office Visit: 12/14/17  Future Office visit:       Requested Prescriptions   Pending Prescriptions Disp Refills     FLUoxetine (PROZAC) 40 MG capsule [Pharmacy Med Name: FLUOXETINE  40MG  CAP] 90 capsule      Sig: TAKE 1 CAPSULE BY MOUTH  DAILY    SSRIs Protocol Failed    4/30/2018 10:37 AM       Failed - PHQ-9 score less than 5 in past 6 months    Please review last PHQ-9 score.   PHQ-9 SCORE 6/6/2017 6/27/2017 11/21/2017   Total Score 9 4 12            Passed - Patient is age 18 or older       Passed - No active pregnancy on record       Passed - No positive pregnancy test in last 12 months       Passed - Recent (6 mo) or future (30 days) visit within the authorizing provider's specialty    Patient had office visit in the last 6 months or has a visit in the next 30 days with authorizing provider or within the authorizing provider's specialty.  See \"Patient Info\" tab in inbasket, or \"Choose Columns\" in Meds & Orders section of the refill encounter.              "

## 2018-05-01 RX ORDER — FLUOXETINE 40 MG/1
CAPSULE ORAL
Qty: 90 CAPSULE | Refills: 0 | Status: SHIPPED | OUTPATIENT
Start: 2018-05-01 | End: 2018-06-25

## 2018-05-01 NOTE — TELEPHONE ENCOUNTER
Medication is being filled for 1 time refill only due to:  Patient needs to be seen because neds follow up appt and phq-9.   Vamsi Zhao RN

## 2018-05-14 ENCOUNTER — TELEPHONE (OUTPATIENT)
Dept: FAMILY MEDICINE | Facility: CLINIC | Age: 68
End: 2018-05-14

## 2018-05-14 NOTE — TELEPHONE ENCOUNTER
Panel Management Review      Patient has the following on her problem list:     Depression / Dysthymia review    Measure:  Needs PHQ-9 score of 4 or less during index window.  Administer PHQ-9 and if score is 5 or more, send encounter to provider for next steps.    5 - 7 month window range: 4/21/18 to 6/21/18    PHQ-9 SCORE 6/6/2017 6/27/2017 11/21/2017   Total Score 9 4 12     OZ-7 SCORE 11/28/2016 6/6/2017 6/27/2017   Total Score 6 5 2     If PHQ-9 recheck is 5 or more, route to provider for next steps.    Patient is due for:  PHQ9, DAP and OZ      Composite cancer screening  Chart review shows that this patient is due/due soon for the following None  Summary:    Patient is due/failing the following:   DAP, FOLLOW UP and PHQ9    Action needed:   Patient needs office visit for Med follow up. and Patient needs to do PHQ9.    Type of outreach:    Phone, spoke to patient.  appt made    Questions for provider review:    None                                                                                                                                    Carmen Laws CMA

## 2018-05-15 ENCOUNTER — OFFICE VISIT (OUTPATIENT)
Dept: FAMILY MEDICINE | Facility: CLINIC | Age: 68
End: 2018-05-15
Payer: MEDICARE

## 2018-05-15 VITALS
BODY MASS INDEX: 29.16 KG/M2 | DIASTOLIC BLOOD PRESSURE: 80 MMHG | SYSTOLIC BLOOD PRESSURE: 128 MMHG | HEART RATE: 63 BPM | HEIGHT: 65 IN | WEIGHT: 175 LBS

## 2018-05-15 DIAGNOSIS — L98.9 CHANGING SKIN LESION: ICD-10-CM

## 2018-05-15 DIAGNOSIS — Z78.0 ASYMPTOMATIC MENOPAUSAL STATE: ICD-10-CM

## 2018-05-15 DIAGNOSIS — F33.9 RECURRENT MAJOR DEPRESSION RESISTANT TO TREATMENT (H): Primary | ICD-10-CM

## 2018-05-15 DIAGNOSIS — F42.9 OBSESSIVE-COMPULSIVE DISORDER, UNSPECIFIED TYPE: ICD-10-CM

## 2018-05-15 PROCEDURE — 99213 OFFICE O/P EST LOW 20 MIN: CPT | Performed by: FAMILY MEDICINE

## 2018-05-15 RX ORDER — BUPROPION HYDROCHLORIDE 100 MG/1
100 TABLET, EXTENDED RELEASE ORAL 2 TIMES DAILY
Qty: 180 TABLET | Refills: 1 | Status: SHIPPED | OUTPATIENT
Start: 2018-05-15 | End: 2018-09-21

## 2018-05-15 ASSESSMENT — ANXIETY QUESTIONNAIRES
GAD7 TOTAL SCORE: 0
2. NOT BEING ABLE TO STOP OR CONTROL WORRYING: NOT AT ALL
6. BECOMING EASILY ANNOYED OR IRRITABLE: NOT AT ALL
3. WORRYING TOO MUCH ABOUT DIFFERENT THINGS: NOT AT ALL
5. BEING SO RESTLESS THAT IT IS HARD TO SIT STILL: NOT AT ALL
1. FEELING NERVOUS, ANXIOUS, OR ON EDGE: NOT AT ALL
7. FEELING AFRAID AS IF SOMETHING AWFUL MIGHT HAPPEN: NOT AT ALL

## 2018-05-15 ASSESSMENT — PATIENT HEALTH QUESTIONNAIRE - PHQ9: 5. POOR APPETITE OR OVEREATING: NOT AT ALL

## 2018-05-15 NOTE — PROGRESS NOTES
SUBJECTIVE:                                                    Ellie Lewis is a 68 year old female who presents to clinic today for the following health issues:    Depression Followup    Status since last visit: Stable     See PHQ-9 for current symptoms.  Other associated symptoms: None    Complicating factors:   Significant life event:  No   Current substance abuse:  None  Anxiety or Panic symptoms:  No    PHQ-9 6/27/2017 11/21/2017 5/15/2018   Total Score 4 12 7   Q9: Suicide Ideation Not at all Several days Several days     OZ-7 SCORE 6/6/2017 6/27/2017 5/15/2018   Total Score 5 2 0     In the past two weeks have you had thoughts of suicide or self-harm?  No.    Do you have concerns about your personal safety or the safety of others?   No  PHQ-9  English  PHQ-9   Any Language  Suicide Assessment Five-step Evaluation and Treatment (SAFE-T)      Hypothyroidism Follow-up    Since last visit, patient describes the following symptoms: Weight stable, no hair loss, no skin changes, no constipation, no loose stools        Problem list and histories reviewed & adjusted, as indicated.  Additional history: anxiety is stable at present but she is just feeling depressed she is not feeling very motivated to do things the Prozac has been working well for her obsession thoughts and she does not really have compulsions at this point she is just feeling more down like she does not want to get up and do anything not very interested in participating in things although she is she has no suicidal thoughts on no heart thoughts of hurting anyone else.  She also has some perianal skin lesions that she was supposed to have removed these have now been enlarging and bothering her she was told that they would need to be removed by someone in the specialty clinic I will refer her for those.    Patient Active Problem List   Diagnosis     Hypothyroidism due to acquired atrophy of thyroid     Recurrent major depression resistant to  "treatment (H)     OCD (obsessive compulsive disorder)     Depression     Status post total replacement of right hip     Advanced care planning/counseling discussion     Recurrent major depressive disorder, in partial remission (H)     History reviewed. No pertinent surgical history.    Social History   Substance Use Topics     Smoking status: Never Smoker     Smokeless tobacco: Never Used     Alcohol use No     History reviewed. No pertinent family history.        ROS:  Constitutional, HEENT, cardiovascular, pulmonary, gi and gu systems are negative, except as otherwise noted.    OBJECTIVE:                                                    /80  Pulse 63  Ht 5' 5\" (1.651 m)  Wt 175 lb (79.4 kg)  BMI 29.12 kg/m2 Body mass index is 29.12 kg/(m^2).   GENERAL APPEARANCE: healthy, alert and no distress  SKIN: no suspicious lesions or rashes  MENTAL STATUS EXAM:    1. Clinical observations: Ellie was clean and was adequately groomed. Ellie's emotional presentation was open and cooperative. She spoke clear and articulate. She maintained good eye contact and she was cooperative in answering questions.   2. She appeared to be well-oriented in all spheres with coherent, logical, goal directed and relevent thinking.   3. Thought content: She denies no abnormal thought process.   4. Affect and mood: Skys affect is described as normal/appropriate and her emotional attitude was open and cooperative. She reports the following sypmtoms: sad feeling or depressed, drawing away from people, lack of interest or enjoyment, feeling negative about the future, less energy than usual and feeling fatiqued.    5. Sensorium and cognition: She was in contact with reality and oriented to time, place and person.  She demonstrated no impairment in immediate, recent, or remote memory. Her insight was adequate and her  intelligence appeared to be average.         ASSESSMENT/PLAN:                                                  "     1. Recurrent major depression resistant to treatment (H)  Will add lower dose wellbutrin checkin in 3 weeks   - DX Hip/Pelvis/Spine; Future  - buPROPion (WELLBUTRIN SR) 100 MG 12 hr tablet; Take 1 tablet (100 mg) by mouth 2 times daily  Dispense: 180 tablet; Refill: 1    2. Obsessive-compulsive disorder, unspecified type  controleld     3. Asymptomatic menopausal state   Needs dexa follow   - DX Hip/Pelvis/Spine; Future    4. Changing skin lesion  Will refer for skin check   - DERMATOLOGY REFERRAL     reports that she has never smoked. She has never used smokeless tobacco.          Rima Agarwal M.D.  [facility]

## 2018-05-15 NOTE — MR AVS SNAPSHOT
After Visit Summary   5/15/2018    Ellie Lewis    MRN: [id number]           Patient Information     Date Of Birth          1950        Visit Information        Provider Department      5/15/2018 11:30 AM Rima Agarwal MD [facility]        Today's Diagnoses     Recurrent major depression resistant to treatment (H)    -  1    Obsessive-compulsive disorder, unspecified type        Asymptomatic menopausal state         Changing skin lesion           Follow-ups after your visit        Additional Services     DERMATOLOGY REFERRAL       Your provider has referred you to: [facility]: [facility] [phone]   [email or web address]    Please be aware that coverage of these services is subject to the terms and limitations of your health insurance plan.  Call member services at your health plan with any benefit or coverage questions.      Please bring the following with you to your appointment:    (1) Any X-Rays, CTs or MRIs which have been performed.  Contact the facility where they were done to arrange for  prior to your scheduled appointment.    (2) List of current medications  (3) This referral request   (4) Any documents/labs given to you for this referral                  Your next 10 appointments already scheduled     Jun 04, 2018  9:15 AM CDT   [facility] Ellis [facility]   [facility]    [address] [address] [phone]              Future tests that were ordered for you today     Open Future Orders        Priority Expected Expires Ordered    DX Hip/Pelvis/Spine Routine  5/15/2019 5/15/2018            Who to contact     Normal or non-critical lab and imaging results will be communicated to you by MyChart, letter or phone within 4 business days after the clinic has received the results. If you do not hear from us within 7 days, please  "contact the clinic through [employer] or phone. If you have a critical or abnormal lab result, we will notify you by phone as soon as possible.  Submit refill requests through [employer] or call your pharmacy and they will forward the refill request to us. Please allow 3 business days for your refill to be completed.          If you need to speak with a  for additional information , please call: [phone]             Additional Information About Your Visit        [employer] Information     [employer] lets you send messages to your doctor, view your test results, renew your prescriptions, schedule appointments and more. To sign up, go to [email or web address] . Click on \"Log in\" on the left side of the screen, which will take you to the Welcome page. Then click on \"Sign up Now\" on the right side of the page.     You will be asked to enter the access code listed below, as well as some personal information. Please follow the directions to create your username and password.     Your access code is: [email or web address]  Expires: 2018 12:07 PM     Your access code will  in 90 days. If you need help or a new code, please call your [facility] clinic or [phone].        Care EveryWhere ID     This is your Care EveryWhere ID. This could be used by other organizations to access your [facility] medical records  [id number]        Your Vitals Were     Pulse Height BMI (Body Mass Index)             63 5' 5\" (1.651 m) 29.12 kg/m2          Blood Pressure from Last 3 Encounters:   05/15/18 128/80   17 118/70   17 114/66    Weight from Last 3 Encounters:   05/15/18 175 lb (79.4 kg)   17 177 lb (80.3 kg)   17 174 lb 4.8 oz (79.1 kg)              We Performed the Following     DEPRESSION ACTION PLAN (DAP)     DERMATOLOGY REFERRAL          Today's Medication Changes          These changes are accurate as of 5/15/18 12:07 PM.  If you have any questions, ask your nurse or doctor.               Start " taking these medicines.        Dose/Directions    buPROPion 100 MG 12 hr tablet   Commonly known as:  WELLBUTRIN SR   Used for:  Recurrent major depression resistant to treatment (H)   Started by:  Rima Agarwal MD        Dose:  100 mg   Take 1 tablet (100 mg) by mouth 2 times daily   Quantity:  180 tablet   Refills:  1            Where to get your medicines      These medications were sent to [facility] - [address], [facility] [[email or web address]] [id number]     Phone:  [phone]     buPROPion 100 MG 12 hr tablet                Primary Care Provider Office Phone # Fax #    Rima Agarwal [phone] [address] [facility] [address]        Equal Access to Services     [facility]: Hadii aad ku hadasho Soomaali, waaxda luqadaha, qaybta kaalmada adeegyada, waxay elbain maki mina . So [facility] [phone].    ATENCIÓN: Si habla español, tiene a terrazas disposición servicios gratuitos de asistencia lingüística. [facility] [phone].    We comply with applicable federal civil rights laws and [place] laws. We do not discriminate on the basis of race, color, national origin, age, disability, sex, sexual orientation, or gender identity.            Thank you!     Thank you for choosing [facility]  for your care. Our goal is always to provide you with excellent care. Hearing back from our patients is one way we can continue to improve our services. Please take a few minutes to complete the written survey that you may receive in the mail after your visit with us. Thank you!             Your Updated Medication List - Protect others around you: Learn how to safely use, store and throw away your medicines at www.disposemymeds.org.          This list is accurate as of 5/15/18 12:07 PM.  Always use your most recent med list.                   Brand Name Dispense Instructions for use Diagnosis    buPROPion 100 MG 12 hr  tablet    WELLBUTRIN SR    180 tablet    Take 1 tablet (100 mg) by mouth 2 times daily    Recurrent major depression resistant to treatment (H)       FLUoxetine 40 MG capsule    PROzac    90 capsule    TAKE 1 CAPSULE BY MOUTH  DAILY    Recurrent major depressive disorder, in partial remission (H)       levothyroxine 100 MCG tablet    SYNTHROID/LEVOTHROID    90 tablet    TAKE 1 TABLET BY MOUTH  DAILY    Acquired hypothyroidism

## 2018-05-16 ASSESSMENT — PATIENT HEALTH QUESTIONNAIRE - PHQ9: SUM OF ALL RESPONSES TO PHQ QUESTIONS 1-9: 7

## 2018-05-16 ASSESSMENT — ANXIETY QUESTIONNAIRES: GAD7 TOTAL SCORE: 0

## 2018-06-25 DIAGNOSIS — F33.41 RECURRENT MAJOR DEPRESSIVE DISORDER, IN PARTIAL REMISSION (H): ICD-10-CM

## 2018-06-25 DIAGNOSIS — E03.9 ACQUIRED HYPOTHYROIDISM: ICD-10-CM

## 2018-06-25 NOTE — TELEPHONE ENCOUNTER
"levothyroxine (SYNTHROID/LEVOTHROID) 100 MCG tablet        Last Written Prescription Date:  4/6/18  Last Fill Quantity: 90,   # refills: 0  Last Office Visit: 5/15/18  Future Office visit:       FLUoxetine (PROZAC) 40 MG capsule      Last Written Prescription Date:  5/1/18  Last Fill Quantity: 90,   # refills: 0  Last Office Visit: 5/15/18  Future Office visit:         Requested Prescriptions   Pending Prescriptions Disp Refills     levothyroxine (SYNTHROID/LEVOTHROID) 100 MCG tablet 90 tablet 0    Thyroid Protocol Passed    6/25/2018  3:15 PM       Passed - Patient is 12 years or older       Passed - Recent (12 mo) or future (30 days) visit within the authorizing provider's specialty    Patient had office visit in the last 12 months or has a visit in the next 30 days with authorizing provider or within the authorizing provider's specialty.  See \"Patient Info\" tab in inbasket, or \"Choose Columns\" in Meds & Orders section of the refill encounter.           Passed - Normal TSH on file in past 12 months    Recent Labs   Lab Test  08/14/17   1015   TSH  0.71             Passed - No active pregnancy on record    If patient is pregnant or has had a positive pregnancy test, please check TSH.         Passed - No positive pregnancy test in past 12 months    If patient is pregnant or has had a positive pregnancy test, please check TSH.          FLUoxetine (PROZAC) 40 MG capsule 90 capsule 0    SSRIs Protocol Failed    6/25/2018  3:15 PM       Failed - PHQ-9 score less than 5 in past 6 months    Please review last PHQ-9 score.          Passed - Patient is age 18 or older       Passed - No active pregnancy on record       Passed - No positive pregnancy test in last 12 months       Passed - Recent (6 mo) or future (30 days) visit within the authorizing provider's specialty    Patient had office visit in the last 6 months or has a visit in the next 30 days with authorizing provider or within the authorizing provider's specialty.  " "See \"Patient Info\" tab in inbasket, or \"Choose Columns\" in Meds & Orders section of the refill encounter.              "

## 2018-06-26 RX ORDER — LEVOTHYROXINE SODIUM 100 UG/1
TABLET ORAL
Qty: 90 TABLET | Refills: 0 | Status: SHIPPED | OUTPATIENT
Start: 2018-06-26 | End: 2018-08-31

## 2018-06-26 RX ORDER — FLUOXETINE 40 MG/1
CAPSULE ORAL
Qty: 90 CAPSULE | Refills: 0 | Status: SHIPPED | OUTPATIENT
Start: 2018-06-26 | End: 2018-08-31

## 2018-09-21 DIAGNOSIS — F33.9 RECURRENT MAJOR DEPRESSION RESISTANT TO TREATMENT (H): ICD-10-CM

## 2018-09-21 NOTE — TELEPHONE ENCOUNTER
"Requested Prescriptions   Pending Prescriptions Disp Refills     buPROPion (WELLBUTRIN SR) 100 MG 12 hr tablet [Pharmacy Med Name: BUPROPION  100MG  TAB  SR 12HR] 180 tablet     Last Written Prescription Date:  5/15/18  Last Fill Quantity: 180,  # refills: 1   Last office visit: 5/15/2018 with prescribing provider:  wendy Hines Office Visit:     Sig: TAKE 1 TABLET BY MOUTH TWO  TIMES DAILY    SSRIs Protocol Failed    9/21/2018  4:52 AM       Failed - PHQ-9 score less than 5 in past 6 months    Please review last PHQ-9 score.          Passed - Medication is Bupropion    If the medication is Bupropion (Wellbutrin), and the patient is taking for smoking cessation; OK to refill.         Passed - Patient is age 18 or older       Passed - No active pregnancy on record       Passed - No positive pregnancy test in last 12 months       Passed - Recent (6 mo) or future (30 days) visit within the authorizing provider's specialty    Patient had office visit in the last 6 months or has a visit in the next 30 days with authorizing provider or within the authorizing provider's specialty.  See \"Patient Info\" tab in inbasket, or \"Choose Columns\" in Meds & Orders section of the refill encounter.              "

## 2018-09-24 RX ORDER — BUPROPION HYDROCHLORIDE 100 MG/1
TABLET, EXTENDED RELEASE ORAL
Qty: 180 TABLET | Refills: 0 | Status: SHIPPED | OUTPATIENT
Start: 2018-09-24 | End: 2018-11-06

## 2018-09-24 NOTE — TELEPHONE ENCOUNTER
Please call she is overdue for her thyroid labs. She will need an office visit in Nov also.I will refill this today. Rima Agarwal M.D.;

## 2018-09-24 NOTE — TELEPHONE ENCOUNTER
**This refill requires provider completion and is not appropriate for RN review per RN refill guidelines.**  PH-Q9 needs to be less than 5 to approve medication on RN Refill protocol pt's score is 7.  Marzena Powell RN

## 2018-10-16 ENCOUNTER — DOCUMENTATION ONLY (OUTPATIENT)
Dept: LAB | Facility: CLINIC | Age: 68
End: 2018-10-16

## 2018-11-06 ENCOUNTER — OFFICE VISIT (OUTPATIENT)
Dept: FAMILY MEDICINE | Facility: CLINIC | Age: 68
End: 2018-11-06
Payer: MEDICARE

## 2018-11-06 VITALS
BODY MASS INDEX: 28.32 KG/M2 | HEIGHT: 65 IN | HEART RATE: 67 BPM | DIASTOLIC BLOOD PRESSURE: 77 MMHG | WEIGHT: 170 LBS | SYSTOLIC BLOOD PRESSURE: 119 MMHG

## 2018-11-06 DIAGNOSIS — F33.9 RECURRENT MAJOR DEPRESSION RESISTANT TO TREATMENT (H): ICD-10-CM

## 2018-11-06 DIAGNOSIS — F42.2 MIXED OBSESSIONAL THOUGHTS AND ACTS: ICD-10-CM

## 2018-11-06 DIAGNOSIS — E03.9 ACQUIRED HYPOTHYROIDISM: ICD-10-CM

## 2018-11-06 DIAGNOSIS — F33.41 RECURRENT MAJOR DEPRESSIVE DISORDER, IN PARTIAL REMISSION (H): ICD-10-CM

## 2018-11-06 DIAGNOSIS — E03.4 HYPOTHYROIDISM DUE TO ACQUIRED ATROPHY OF THYROID: Primary | ICD-10-CM

## 2018-11-06 DIAGNOSIS — D48.5 NEOPLASM OF UNCERTAIN BEHAVIOR OF SKIN OF BUTTOCK: ICD-10-CM

## 2018-11-06 LAB — TSH SERPL DL<=0.005 MIU/L-ACNC: 1.79 MU/L (ref 0.4–4)

## 2018-11-06 PROCEDURE — 99214 OFFICE O/P EST MOD 30 MIN: CPT | Performed by: FAMILY MEDICINE

## 2018-11-06 PROCEDURE — 36415 COLL VENOUS BLD VENIPUNCTURE: CPT | Performed by: FAMILY MEDICINE

## 2018-11-06 PROCEDURE — 84443 ASSAY THYROID STIM HORMONE: CPT | Performed by: FAMILY MEDICINE

## 2018-11-06 RX ORDER — BUPROPION HYDROCHLORIDE 100 MG/1
TABLET, EXTENDED RELEASE ORAL
Qty: 180 TABLET | Refills: 1 | Status: SHIPPED | OUTPATIENT
Start: 2018-11-06 | End: 2019-01-22

## 2018-11-06 RX ORDER — LEVOTHYROXINE SODIUM 100 UG/1
TABLET ORAL
Qty: 90 TABLET | Refills: 3 | Status: SHIPPED | OUTPATIENT
Start: 2018-11-06 | End: 2019-09-06

## 2018-11-06 RX ORDER — FLUOXETINE 40 MG/1
CAPSULE ORAL
Qty: 90 CAPSULE | Refills: 1 | Status: SHIPPED | OUTPATIENT
Start: 2018-11-06 | End: 2019-01-22

## 2018-11-06 ASSESSMENT — ANXIETY QUESTIONNAIRES
6. BECOMING EASILY ANNOYED OR IRRITABLE: NOT AT ALL
7. FEELING AFRAID AS IF SOMETHING AWFUL MIGHT HAPPEN: NOT AT ALL
1. FEELING NERVOUS, ANXIOUS, OR ON EDGE: NOT AT ALL
5. BEING SO RESTLESS THAT IT IS HARD TO SIT STILL: NOT AT ALL
2. NOT BEING ABLE TO STOP OR CONTROL WORRYING: NOT AT ALL
3. WORRYING TOO MUCH ABOUT DIFFERENT THINGS: NOT AT ALL
GAD7 TOTAL SCORE: 0

## 2018-11-06 ASSESSMENT — PATIENT HEALTH QUESTIONNAIRE - PHQ9
5. POOR APPETITE OR OVEREATING: NOT AT ALL
SUM OF ALL RESPONSES TO PHQ QUESTIONS 1-9: 5

## 2018-11-06 NOTE — PROGRESS NOTES
"  SUBJECTIVE:   Ellie Lewis is a 68 year old female who presents to clinic today for the following health issues:    Hypothyroidism Follow-up      Since last visit, patient describes the following symptoms: Weight stable, no hair loss, no skin changes, no constipation, no loose stools      Amount of exercise or physical activity: 2-3 days/week for an average of 15-30 minutes    Problems taking medications regularly: No    Medication side effects: none    Diet: regular (no restrictions)    * growth inside rectum    Doing well on the wellbutrin   She is using the sr lower dose and it is working without anxiety   prozac is controlling     She also has a growth that has been there for about 1 year in the buttocks for about 1 more year  Non painful  Maybe changed a bit   Not bleeding   Just noted when she was wiping   No history of  Skin cancer         Problem list and histories reviewed & adjusted, as indicated.  Additional history:     Patient Active Problem List   Diagnosis     Hypothyroidism due to acquired atrophy of thyroid     Recurrent major depression resistant to treatment (H)     OCD (obsessive compulsive disorder)     Depression     Status post total replacement of right hip     Advanced care planning/counseling discussion     Recurrent major depressive disorder, in partial remission (H)     History reviewed. No pertinent surgical history.    Social History   Substance Use Topics     Smoking status: Never Smoker     Smokeless tobacco: Never Used     Alcohol use No     Family History   Problem Relation Age of Onset     Heart Murmur Mother      Alzheimer Disease Father            Reviewed and updated as needed this visit by clinical staff       Reviewed and updated as needed this visit by Provider         ROS:  Constitutional, HEENT, cardiovascular, pulmonary, gi and gu systems are negative, except as otherwise noted.    OBJECTIVE:     /77  Pulse 67  Ht 5' 5\" (1.651 m)  Wt 170 lb (77.1 kg)  " "Breastfeeding? No  BMI 28.29 kg/m2  Body mass index is 28.29 kg/(m^2).  GENERAL: healthy, alert and no distress  NECK: no adenopathy, no asymmetry, masses, or scars and thyroid normal to palpation  RESP: lungs clear to auscultation - no rales, rhonchi or wheezes  CV: regular rate and rhythm, normal S1 S2, no S3 or S4, no murmur, click or rub, no peripheral edema and peripheral pulses strong  ABDOMEN: soft, nontender, no hepatosplenomegaly, no masses and bowel sounds normal  MS: no gross musculoskeletal defects noted, no edema  SKIN: macule - on right buttock by anus  and group of pigmented lesion on left buttocks with nodular surface ? Verrucous     Diagnostic Test Results:  No results found for this or any previous visit (from the past 24 hour(s)).    ASSESSMENT/PLAN:         BMI:   Estimated body mass index is 28.29 kg/(m^2) as calculated from the following:    Height as of this encounter: 5' 5\" (1.651 m).    Weight as of this encounter: 170 lb (77.1 kg).         1. Hypothyroidism due to acquired atrophy of thyroid  Check to day   - TSH with free T4 reflex    2. Recurrent major depression resistant to treatment (H)    - buPROPion (WELLBUTRIN SR) 100 MG 12 hr tablet; TAKE 1 TABLET BY MOUTH TWO  TIMES DAILY  Dispense: 180 tablet; Refill: 1    3. Mixed obsessional thoughts and acts      4. 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 Will refer to derm for further assessment   - DERMATOLOGY REFERRAL    5. Recurrent major depressive disorder, in partial remission (H)  Doing well.   - FLUoxetine (PROZAC) 40 MG capsule; TAKE 1 CAPSULE BY MOUTH  DAILY  Dispense: 90 capsule; Refill: 1    6. Acquired hypothyroidism    - levothyroxine (SYNTHROID/LEVOTHROID) 100 MCG tablet; TAKE 1 TABLET BY MOUTH  DAILY  Dispense: 90 tablet; Refill: 3    FUTURE APPOINTMENTS:       - Follow-up for annual visit or as needed    Rima Agarwal MD  [place] [facility]

## 2018-11-06 NOTE — NURSING NOTE
"Initial /77  Pulse 67  Ht 5' 5\" (1.651 m)  Wt 170 lb (77.1 kg)  Breastfeeding? No  BMI 28.29 kg/m2 Estimated body mass index is 28.29 kg/(m^2) as calculated from the following:    Height as of this encounter: 5' 5\" (1.651 m).    Weight as of this encounter: 170 lb (77.1 kg). .      "

## 2018-11-06 NOTE — PROGRESS NOTES
Please notify patient that the labs are normal/stable. No change in medication Rima Agarwal M.D.

## 2018-11-06 NOTE — PATIENT INSTRUCTIONS
Please make the appointment with the dermatologist.   I will send you the labs when they are back.

## 2018-11-06 NOTE — MR AVS SNAPSHOT
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   I will send you the labs when they are back.           Follow-ups after your visit        Additional Services     DERMATOLOGY REFERRAL       Your provider has referred you to: [facility]: [facility] [phone]   [email or web address]    Please be aware that coverage of these services is subject to the terms and limitations of your health insurance plan.  Call member services at your health plan with any benefit or coverage questions.      Please bring the following with you to your appointment:    (1) Any X-Rays, CTs or MRIs which have been performed.  Contact the facility where they were done to arrange for  prior to your scheduled appointment.    (2) List of current medications  (3) This referral request   (4) Any documents/labs given to you for this referral                  Who to contact     Normal or non-critical lab and imaging results will be communicated to you by MyChart, letter or phone within 4 business days after the clinic has received the results. If you do not hear from us within 7 days, please contact the clinic through MyChart or phone. If you have a critical or abnormal lab result, we will notify you by phone as soon as possible.  Submit refill requests through [employer] or call your pharmacy and they will forward the refill request to us. Please allow 3 business  "days for your refill to be completed.          If you need to speak with a  for additional information , please call: [phone]             Additional Information About Your Visit        [employer] Information     [employer] lets you send messages to your doctor, view your test results, renew your prescriptions, schedule appointments and more. To sign up, go to [email or web address] . Click on \"Log in\" on the left side of the screen, which will take you to the Welcome page. Then click on \"Sign up Now\" on the right side of the page.     You will be asked to enter the access code listed below, as well as some personal information. Please follow the directions to create your username and password.     Your access code is: [email or web address]  Expires: 2019  9:01 AM     Your access code will  in 90 days. If you need help or a new code, please call your [place] clinic or [phone].        Care EveryWhere ID     This is your Care EveryWhere ID. This could be used by other organizations to access your [place] medical records  [id number]        Your Vitals Were     Pulse Height Breastfeeding? BMI (Body Mass Index)          67 5' 5\" (1.651 m) No 28.29 kg/m2         Blood Pressure from Last 3 Encounters:   18 119/77   05/15/18 128/80   17 118/70    Weight from Last 3 Encounters:   18 170 lb (77.1 kg)   05/15/18 175 lb (79.4 kg)   17 177 lb (80.3 kg)              We Performed the Following     DERMATOLOGY REFERRAL     TSH with free T4 reflex        Primary Care Provider Office Phone # Fax #    Rima Agarwal [phone] [address] [address]        Equal Access to Services     [facility] AH: Hadii leticia Reyes, wagloria gtz, qaybta kaalmacarlitos thrasher, chico salazar. So [facility] [phone].    ATENCIÓN: Si habla español, tiene a terrazas disposición servicios gratuitos de asistencia lingüística. Llame al " [phone].    We comply with applicable federal civil rights laws and [place] laws. We do not discriminate on the basis of race, color, national origin, age, disability, sex, sexual orientation, or gender identity.            Thank you!     Thank you for choosing [facility]  for your care. Our goal is always to provide you with excellent care. Hearing back from our patients is one way we can continue to improve our services. Please take a few minutes to complete the written survey that you may receive in the mail after your visit with us. Thank you!             Your Updated Medication List - Protect others around you: Learn how to safely use, store and throw away your medicines at www.disposemymeds.org.          This list is accurate as of 11/6/18  9:01 AM.  Always use your most recent med list.                   Brand Name Dispense Instructions for use Diagnosis    buPROPion 100 MG 12 hr tablet    WELLBUTRIN SR    180 tablet    TAKE 1 TABLET BY MOUTH TWO  TIMES DAILY    Recurrent major depression resistant to treatment (H)       FLUoxetine 40 MG capsule    PROzac    90 capsule    TAKE 1 CAPSULE BY MOUTH  DAILY    Recurrent major depressive disorder, in partial remission (H)       levothyroxine 100 MCG tablet    SYNTHROID/LEVOTHROID    90 tablet    TAKE 1 TABLET BY MOUTH  DAILY    Acquired hypothyroidism

## 2018-11-07 ASSESSMENT — ANXIETY QUESTIONNAIRES: GAD7 TOTAL SCORE: 0

## 2018-12-08 ENCOUNTER — TELEPHONE (OUTPATIENT)
Dept: FAMILY MEDICINE | Facility: CLINIC | Age: 68
End: 2018-12-08

## 2018-12-14 ENCOUNTER — OFFICE VISIT (OUTPATIENT)
Dept: DERMATOLOGY | Facility: CLINIC | Age: 68
End: 2018-12-14
Payer: MEDICARE

## 2018-12-14 VITALS — HEART RATE: 65 BPM | DIASTOLIC BLOOD PRESSURE: 79 MMHG | OXYGEN SATURATION: 95 % | SYSTOLIC BLOOD PRESSURE: 135 MMHG

## 2018-12-14 DIAGNOSIS — A63.0 ANAL CONDYLOMA: Primary | ICD-10-CM

## 2018-12-14 DIAGNOSIS — D48.5 NEOPLASM OF UNCERTAIN BEHAVIOR OF SKIN: ICD-10-CM

## 2018-12-14 PROCEDURE — 17110 DESTRUCTION B9 LES UP TO 14: CPT | Mod: 51 | Performed by: PHYSICIAN ASSISTANT

## 2018-12-14 PROCEDURE — 88305 TISSUE EXAM BY PATHOLOGIST: CPT | Mod: TC | Performed by: PHYSICIAN ASSISTANT

## 2018-12-14 PROCEDURE — 11100 HC BIOPSY SKIN/SUBQ/MUC MEM, SINGLE LESION: CPT | Mod: 59 | Performed by: PHYSICIAN ASSISTANT

## 2018-12-14 NOTE — LETTER
12/14/2018         RE: Ellie Lewis  [address] [facility] [place] [place] [address]        Dear Colleague,    Thank you for referring your patient, Ellie Lewis, to the [facility]. Please see a copy of my visit note below.    Ellie Lewis is a 68 year old year old female patient here today for spots on buttocks.  Patient states this has been present for 2-3 years.  Patient reports the following symptoms:  Growing in size.  Patient reports the following previous treatments none.  Patient has no other skin complaints today.  Remainder of the HPI, Meds, PMH, Allergies, FH, and SH was reviewed in chart.    History reviewed. No pertinent past medical history.    History reviewed. No pertinent surgical history.     Family History   Problem Relation Age of Onset     Heart Murmur Mother      Alzheimer Disease Father        Social History     Socioeconomic History     Marital status:      Spouse name: Not on file     Number of children: Not on file     Years of education: Not on file     Highest education level: Not on file   Social Needs     Financial resource strain: Not on file     Food insecurity - worry: Not on file     Food insecurity - inability: Not on file     Transportation needs - medical: Not on file     Transportation needs - non-medical: Not on file   Occupational History     Not on file   Tobacco Use     Smoking status: Never Smoker     Smokeless tobacco: Never Used   Substance and Sexual Activity     Alcohol use: No     Drug use: No     Sexual activity: Yes     Partners: Male   Other Topics Concern     Parent/sibling w/ CABG, MI or angioplasty before 65F 55M? No   Social History Narrative     Not on file       Outpatient Encounter Medications as of 12/14/2018   Medication Sig Dispense Refill     buPROPion (WELLBUTRIN SR) 100 MG 12 hr tablet TAKE 1 TABLET BY MOUTH TWO  TIMES DAILY 180 tablet 1     FLUoxetine (PROZAC) 40 MG capsule TAKE 1 CAPSULE BY MOUTH  DAILY 90 capsule 1      levothyroxine (SYNTHROID/LEVOTHROID) 100 MCG tablet TAKE 1 TABLET BY MOUTH  DAILY 90 tablet 3     No facility-administered encounter medications on file as of 12/14/2018.              Review Of Systems  Skin: As above  Eyes: negative  Ears/Nose/Throat: negative  Respiratory: No shortness of breath, dyspnea on exertion, cough, or hemoptysis  Cardiovascular: negative  Gastrointestinal: negative  Genitourinary: negative  Musculoskeletal: negative  Neurologic: negative  Psychiatric: negative  Hematologic/Lymphatic/Immunologic: negative  Endocrine: negative      O:   NAD, WDWN, Alert & Oriented, Mood & Affect wnl, Vitals stable   Here today alone   /79   Pulse 65   SpO2 95%    General appearance normal   Vitals stable   Alert, oriented and in no acute distress     Brown verrucous papules x 4 perirectal   1.5 cm thin brown plaque on right buttock       Eyes: Conjunctivae/lids:Normal     ENT: Lips: normal    MSK:Normal    Cardiovascular: peripheral edema none    Pulm: Breathing Normal    Neuro/Psych: Orientation:Normal; Mood/Affect:Normal  A/P:  1. Genital warts x 4 buttocks   LN2:  Treated with LN2 for 5s for 1-2 cycles. Warned risks of blistering, pain, pigment change, scarring, and incomplete resolution.  Advised patient to return if lesions do not completely resolve.  Wound care sheet given.  2. R/O genital wart vs scis on right buttock   TANGENTIAL BIOPSY SENT OUT:  After consent, anesthesia with LEC and prep, tangential excision performed and specimen sent out for permanent section histology.  No complications and routine wound care. Patient told to call our office in 1-2 weeks for result.      Return to clinic one month or sooner if needed.     Again, thank you for allowing me to participate in the care of your patient.        Sincerely,        Aurea Rivera PA-C

## 2018-12-14 NOTE — PROGRESS NOTES
Ellie Lewis is a 68 year old year old female patient here today for spots on buttocks.  Patient states this has been present for 2-3 years.  Patient reports the following symptoms:  Growing in size.  Patient reports the following previous treatments none.  Patient has no other skin complaints today.  Remainder of the HPI, Meds, PMH, Allergies, FH, and SH was reviewed in chart.    History reviewed. No pertinent past medical history.    History reviewed. No pertinent surgical history.     Family History   Problem Relation Age of Onset     Heart Murmur Mother      Alzheimer Disease Father        Social History     Socioeconomic History     Marital status:      Spouse name: Not on file     Number of children: Not on file     Years of education: Not on file     Highest education level: Not on file   Social Needs     Financial resource strain: Not on file     Food insecurity - worry: Not on file     Food insecurity - inability: Not on file     Transportation needs - medical: Not on file     Transportation needs - non-medical: Not on file   Occupational History     Not on file   Tobacco Use     Smoking status: Never Smoker     Smokeless tobacco: Never Used   Substance and Sexual Activity     Alcohol use: No     Drug use: No     Sexual activity: Yes     Partners: Male   Other Topics Concern     Parent/sibling w/ CABG, MI or angioplasty before 65F 55M? No   Social History Narrative     Not on file       Outpatient Encounter Medications as of 12/14/2018   Medication Sig Dispense Refill     buPROPion (WELLBUTRIN SR) 100 MG 12 hr tablet TAKE 1 TABLET BY MOUTH TWO  TIMES DAILY 180 tablet 1     FLUoxetine (PROZAC) 40 MG capsule TAKE 1 CAPSULE BY MOUTH  DAILY 90 capsule 1     levothyroxine (SYNTHROID/LEVOTHROID) 100 MCG tablet TAKE 1 TABLET BY MOUTH  DAILY 90 tablet 3     No facility-administered encounter medications on file as of 12/14/2018.              Review Of Systems  Skin: As above  Eyes:  negative  Ears/Nose/Throat: negative  Respiratory: No shortness of breath, dyspnea on exertion, cough, or hemoptysis  Cardiovascular: negative  Gastrointestinal: negative  Genitourinary: negative  Musculoskeletal: negative  Neurologic: negative  Psychiatric: negative  Hematologic/Lymphatic/Immunologic: negative  Endocrine: negative      O:   NAD, WDWN, Alert & Oriented, Mood & Affect wnl, Vitals stable   Here today alone   /79   Pulse 65   SpO2 95%    General appearance normal   Vitals stable   Alert, oriented and in no acute distress     Brown verrucous papules x 4 perirectal   1.5 cm thin brown plaque on right buttock       Eyes: Conjunctivae/lids:Normal     ENT: Lips: normal    MSK:Normal    Cardiovascular: peripheral edema none    Pulm: Breathing Normal    Neuro/Psych: Orientation:Normal; Mood/Affect:Normal  A/P:  1. Genital warts x 4 buttocks   LN2:  Treated with LN2 for 5s for 1-2 cycles. Warned risks of blistering, pain, pigment change, scarring, and incomplete resolution.  Advised patient to return if lesions do not completely resolve.  Wound care sheet given.  2. R/O genital wart vs scis on right buttock   TANGENTIAL BIOPSY SENT OUT:  After consent, anesthesia with LEC and prep, tangential excision performed and specimen sent out for permanent section histology.  No complications and routine wound care. Patient told to call our office in 1-2 weeks for result.      Return to clinic one month or sooner if needed.

## 2018-12-14 NOTE — NURSING NOTE
Chief Complaint   Patient presents with     Derm Problem       Vitals:    12/14/18 1322   BP: 135/79   Pulse: 65   SpO2: 95%     Wt Readings from Last 1 Encounters:   11/06/18 77.1 kg (170 lb)       Vivi Guerra LPN.................12/14/2018

## 2018-12-14 NOTE — PATIENT INSTRUCTIONS
Wound Care Instructions     FOR SUPERFICIAL WOUNDS     [facility] [phone]    [facility] [phone]    Right buttock                   AFTER 24 HOURS YOU SHOULD REMOVE THE BANDAGE AND BEGIN DAILY DRESSING CHANGES AS FOLLOWS:     1) Remove Dressing.     2) Clean and dry the area with tap water using a Q-tip or sterile gauze pad.     3) Apply Vaseline, Aquaphor, Polysporin ointment or Bacitracin ointment over entire wound.  Do NOT use Neosporin ointment.     4) Cover the wound with a band-aid, or a sterile non-stick gauze pad and micropore paper tape      REPEAT THESE INSTRUCTIONS AT LEAST ONCE A DAY UNTIL THE WOUND HAS COMPLETELY HEALED.    It is an old wives tale that a wound heals better when it is exposed to air and allowed to dry out. The wound will heal faster with a better cosmetic result if it is kept moist with ointment and covered with a bandage.    **Do not let the wound dry out.**      Supplies Needed:      *Cotton tipped applicators (Q-tips)    *Polysporin Ointment or Bacitracin Ointment (NOT NEOSPORIN)    *Band-aids or non-stick gauze pads and micropore paper tape.      PATIENT INFORMATION:    During the healing process you will notice a number of changes. All wounds develop a small halo of redness surrounding the wound.  This means healing is occurring. Severe itching with extensive redness usually indicates sensitivity to the ointment or bandage tape used to dress the wound.  You should call our office if this develops.      Swelling  and/or discoloration around your surgical site is common, particularly when performed around the eye.    All wounds normally drain.  The larger the wound the more drainage there will be.  After 7-10 days, you will notice the wound beginning to shrink and new skin will begin to grow.  The wound is healed when you can see skin has formed over the entire area.  A healed wound has a healthy, shiny look to the surface and is red to dark  pink in color to normalize.  Wounds may take approximately 4-6 weeks to heal.  Larger wounds may take 6-8 weeks.  After the wound is healed you may discontinue dressing changes.    You may experience a sensation of tightness as your wound heals. This is normal and will gradually subside.    Your healed wound may be sensitive to temperature changes. This sensitivity improves with time, but if you re having a lot of discomfort, try to avoid temperature extremes.    Patients frequently experience itching after their wound appears to have healed because of the continue healing under the skin.  Plain Vaseline will help relieve the itching.        POSSIBLE COMPLICATIONS    BLEEDIN. Leave the bandage in place.  2. Use tightly rolled up gauze or a cloth to apply direct pressure over the bandage for 30  minutes.  3. Reapply pressure for an additional 30 minutes if necessary  4. Use additional gauze and tape to maintain pressure once the bleeding has stopped.    WOUND CARE INSTRUCTIONS   FOR CRYOSURGERY   This area treated with liquid nitrogen should form a blister (areas treated may or may not blister-skin may just turn dark and slough off). You do not need to bandage the area unless a blister forms and breaks (which may be a few days). When the blister breaks, begin daily dressing changes as follows:  1) Clean and dry the area with tap water using clean Q-tip or sterile gauze pad.   2) Apply Polysporin ointment or Bacitracin ointment over entire wound. Do NOT use Neosporin ointment.   3) Cover the wound with a band-aid or sterile non-stick gauze pad and micropore paper tape.   REPEAT THESE INSTRUCTIONS AT LEAST ONCE A DAY UNTIL THE WOUND HAS COMPLETELY HEALED.   It is an old wives tale that a wound heals better when it is exposed to air and allowed to dry out. The wound will heal faster with a better cosmetic result if it is kept moist with ointment and covered with a bandage.   Do not let the wound dry out.    IMPORTANT INFORMATION ON REVERSE SIDE   Supplies Needed:   *Cotton tipped applicators (Q-tips)   *Polysporin ointment or Bacitracin ointment (NOT NEOSPORIN)   *Band-aids, or non stick gauze pads and micropore paper tape   PATIENT INFORMATION   During the healing process you will notice a number of changes. All wounds develop a small halo of redness surrounding the wound. This means healing is occurring. Severe itching with extensive redness usually indicates sensitivity to the ointment or bandage tape used to dress the wound. You should call our office if this develops.   Swelling and/or discoloration around your surgical site is common, particularly when performed around the eye.   All wounds normally drain. The larger the wound the more drainage there will be. After 7-10 days, you will notice the wound beginning to shrink and new skin will begin to grow. The wound is healed when you can see skin has formed over the entire area. A healed wound has a healthy, shiny look to the surface and is red to dark pink in color to normalize. Wounds may take approximately 4-6 weeks to heal. Larger wounds may take 6-8 weeks. After the wound is healed you may discontinue dressing changes.   You may experience a sensation of tightness as your wound heals. This is normal and will gradually subside.   Your healed wound may be sensitive to temperature changes. This sensitivity improves with time, but if you re having a lot of discomfort, try to avoid temperature extremes.   Patients frequently experience itching after their wound appears to have healed because of the continue healing under the skin. Plain Vaseline will help relieve the itching.

## 2018-12-19 NOTE — TELEPHONE ENCOUNTER
12/19/2018    Attempt 2    Contacted patient in regards to scheduling VIP mammogram  Message answered and hung up    Patient is also due for -     Comments:       Outreach   Malu Pinto

## 2018-12-20 LAB — COPATH REPORT: NORMAL

## 2019-01-22 DIAGNOSIS — F33.41 RECURRENT MAJOR DEPRESSIVE DISORDER, IN PARTIAL REMISSION (H): ICD-10-CM

## 2019-01-22 DIAGNOSIS — F33.9 RECURRENT MAJOR DEPRESSION RESISTANT TO TREATMENT (H): ICD-10-CM

## 2019-01-22 NOTE — TELEPHONE ENCOUNTER
"BUPROPION  100MG  TAB  SR 12HR      Last Written Prescription Date:  11/6/18  Last Fill Quantity: 180,   # refills: 1  Last Office Visit: 11/6/18  Future Office visit:    Next 5 appointments (look out 90 days)    Jan 29, 2019  9:40 AM CST  [facility]a Bed[facility] [address] [address]  [phone]           FLUoxetine (PROZAC) 40 MG capsule      Last Written Prescription Date:  11/6/18  Last Fill Quantity: 90,   # refills: 1  Last Office Visit: 11/6/18  Future Office visit:    Next 5 appointments (look out 90 days)    Jan 29, 2019  9:40 AM CST  [facility] Visit with [facility]urea Bed[facility]-C  [facility] [address] [address]  [phone]           Requested Prescriptions   Pending Prescriptions Disp Refills     buPROPion (WELLBUTRIN SR) 100 MG 12 hr tablet [Pharmacy Med Name: BUPROPION  100MG  TAB  SR 12HR] 180 tablet 1     Sig: TAKE 1 TABLET BY MOUTH TWO  TIMES DAILY    SSRIs Protocol Failed - 1/22/2019  9:44 AM       Failed - PHQ-9 score less than 5 in past 6 months    Please review last PHQ-9 score.          Passed - Medication is Bupropion    If the medication is Bupropion (Wellbutrin), and the patient is taking for smoking cessation; OK to refill.         Passed - Medication is active on med list       Passed - Patient is age 18 or older       Passed - No active pregnancy on record       Passed - No positive pregnancy test in last 12 months       Passed - Recent (6 mo) or future (30 days) visit within the authorizing provider's specialty    Patient had office visit in the last 6 months or has a visit in the next 30 days with authorizing provider or within the authorizing provider's specialty.  See \"Patient Info\" tab in inbasket, or \"Choose Columns\" in Meds & Orders section of the refill encounter.            FLUoxetine (PROZAC) 40 MG capsule " "[Pharmacy Med Name: FLUOXETINE  40MG  CAP] 90 capsule 1     Sig: TAKE 1 CAPSULE BY MOUTH  DAILY    SSRIs Protocol Failed - 1/22/2019  9:44 AM       Failed - PHQ-9 score less than 5 in past 6 months    Please review last PHQ-9 score.          Passed - Medication is Bupropion    If the medication is Bupropion (Wellbutrin), and the patient is taking for smoking cessation; OK to refill.         Passed - Medication is active on med list       Passed - Patient is age 18 or older       Passed - No active pregnancy on record       Passed - No positive pregnancy test in last 12 months       Passed - Recent (6 mo) or future (30 days) visit within the authorizing provider's specialty    Patient had office visit in the last 6 months or has a visit in the next 30 days with authorizing provider or within the authorizing provider's specialty.  See \"Patient Info\" tab in inbasket, or \"Choose Columns\" in Meds & Orders section of the refill encounter.              "

## 2019-01-23 RX ORDER — BUPROPION HYDROCHLORIDE 100 MG/1
TABLET, EXTENDED RELEASE ORAL
Qty: 180 TABLET | Refills: 2 | Status: SHIPPED | OUTPATIENT
Start: 2019-01-23 | End: 2019-11-12

## 2019-01-23 RX ORDER — FLUOXETINE 40 MG/1
CAPSULE ORAL
Qty: 90 CAPSULE | Refills: 2 | Status: SHIPPED | OUTPATIENT
Start: 2019-01-23 | End: 2019-11-12

## 2019-01-23 NOTE — TELEPHONE ENCOUNTER
Pres[place] approved per [facility] [place] [facility].  Vamsi Zhao RN

## 2019-02-12 ENCOUNTER — TELEPHONE (OUTPATIENT)
Dept: FAMILY MEDICINE | Facility: CLINIC | Age: 69
End: 2019-02-12

## 2019-02-12 NOTE — TELEPHONE ENCOUNTER
Patient states she has been having some left hip pain ongoing for several months that is getting slightly worse. States she gets shooting pains in it at times with certain movement. Scheduled her to see Dr. Massey on March 5th per her request to wait as she has company coming in from out of town.    Marzena Powell RN

## 2019-02-12 NOTE — TELEPHONE ENCOUNTER
"Reason for call:  Patient reporting a symptom    Symptom or request: Ellie is calling for a referral for \"some ortho place in [place]\" for her hip.  She has not seen Dr. Agarwal for this previously, but it recently just developed.  She believes that she may need a hip replacement. Asked her to let us know where she her insurance will pay for to go or who to see in that area.   Please call and get more information.  ..Leny Appiah    Duration (how long have symptoms been present): several months    Have you been treated for this before? no    Phone Number patient can be reached at:  Home number on file [phone] (home)    Best Time:  Any time    Can we leave a detailed message on this number:  YES you can leave message.  Thank you..Leny Appiah      Call taken on 2/12/2019 at 11:14 AM by Leny Appiah    "

## 2019-02-27 ENCOUNTER — OFFICE VISIT (OUTPATIENT)
Dept: DERMATOLOGY | Facility: CLINIC | Age: 69
End: 2019-02-27
Payer: MEDICARE

## 2019-02-27 VITALS — SYSTOLIC BLOOD PRESSURE: 130 MMHG | HEART RATE: 66 BPM | OXYGEN SATURATION: 98 % | DIASTOLIC BLOOD PRESSURE: 78 MMHG

## 2019-02-27 DIAGNOSIS — L82.1 SEBORRHEIC KERATOSIS: ICD-10-CM

## 2019-02-27 DIAGNOSIS — L82.0 INFLAMED SEBORRHEIC KERATOSIS: Primary | ICD-10-CM

## 2019-02-27 PROCEDURE — 17110 DESTRUCTION B9 LES UP TO 14: CPT | Performed by: PHYSICIAN ASSISTANT

## 2019-02-27 PROCEDURE — 99213 OFFICE O/P EST LOW 20 MIN: CPT | Mod: 25 | Performed by: PHYSICIAN ASSISTANT

## 2019-02-27 NOTE — PROGRESS NOTES
Ellie Lewis is a 69 year old year old female patient here today for recheck spot on buttocks. LOV biopsied right buttock which returned consistent with an inflamed seborrheic keratosis. She also notes new spot on leg.  Patient has no other skin complaints today.  Remainder of the HPI, Meds, PMH, Allergies, FH, and SH was reviewed in chart.    Pertinent Hx:   No personal history of skin cancer.   History reviewed. No pertinent past medical history.    History reviewed. No pertinent surgical history.     Family History   Problem Relation Age of Onset     Heart Murmur Mother      Alzheimer Disease Father        Social History     Socioeconomic History     Marital status:      Spouse name: Not on file     Number of children: Not on file     Years of education: Not on file     Highest education level: Not on file   Occupational History     Not on file   Social Needs     Financial resource strain: Not on file     Food insecurity:     Worry: Not on file     Inability: Not on file     Transportation needs:     Medical: Not on file     Non-medical: Not on file   Tobacco Use     Smoking status: Never Smoker     Smokeless tobacco: Never Used   Substance and Sexual Activity     Alcohol use: No     Drug use: No     Sexual activity: Yes     Partners: Male   Lifestyle     Physical activity:     Days per week: Not on file     Minutes per session: Not on file     Stress: Not on file   Relationships     Social connections:     Talks on phone: Not on file     Gets together: Not on file     Attends Anglican service: Not on file     Active member of club or organization: Not on file     Attends meetings of clubs or organizations: Not on file     Relationship status: Not on file     Intimate partner violence:     Fear of current or ex partner: Not on file     Emotionally abused: Not on file     Physically abused: Not on file     Forced sexual activity: Not on file   Other Topics Concern     Parent/sibling w/ CABG, MI or  angioplasty before 65F 55M? No   Social History Narrative     Not on file       Outpatient Encounter Medications as of 2/27/2019   Medication Sig Dispense Refill     buPROPion (WELLBUTRIN SR) 100 MG 12 hr tablet TAKE 1 TABLET BY MOUTH TWO  TIMES DAILY 180 tablet 2     FLUoxetine (PROZAC) 40 MG capsule TAKE 1 CAPSULE BY MOUTH  DAILY 90 capsule 2     levothyroxine (SYNTHROID/LEVOTHROID) 100 MCG tablet TAKE 1 TABLET BY MOUTH  DAILY 90 tablet 3     No facility-administered encounter medications on file as of 2/27/2019.              Review Of Systems  Skin: As above  Eyes: negative  Ears/Nose/Throat: negative  Respiratory: No shortness of breath, dyspnea on exertion, cough, or hemoptysis  Cardiovascular: negative  Gastrointestinal: negative  Genitourinary: negative  Musculoskeletal: negative  Neurologic: negative  Psychiatric: negative  Hematologic/Lymphatic/Immunologic: negative  Endocrine: negative      O:   NAD, WDWN, Alert & Oriented, Mood & Affect wnl, Vitals stable   Here today alone   /78 (BP Location: Right arm, Patient Position: Sitting, Cuff Size: Adult Large)   Pulse 66   SpO2 98%    General appearance normal   Vitals stable   Alert, oriented and in no acute distress     Brown stuck on papule on leg   Brown thin plaque on right buttock      The remainder of the detailed exam was unremarkable; the following areas were examined:  scalp/hair, conjunctiva/lids, face, neck, lips/teeth, oral mucosa/gingiva, chest, back, abdomen, buttocks, digits/nails, RUE, LUE, RLE, LLE.      Eyes: Conjunctivae/lids:Normal     ENT: Lips: normal    MSK:Normal    Cardiovascular: peripheral edema none    Pulm: Breathing Normal    Neuro/Psych: Orientation:Normal; Mood/Affect:Normal  A/P:  1. Inflamed seborrheic keratosis on right buttocks x 1  LN2:  Treated with LN2 for 5s for 1-2 cycles. Warned risks of blistering, pain, pigment change, scarring, and incomplete resolution.  Advised patient to return if lesions do not  completely resolve.  Wound care sheet given.  2. Seborrheic keratosis   BENIGN LESIONS DISCUSSED WITH PATIENT:  I discussed the specifics of tumor, prognosis, and genetics of benign lesions.  I explained that treatment of these lesions would be purely cosmetic and not medically neccessary.  I discussed with patient different removal options including excision, cautery and /or laser.      Nature and genetics of benign skin lesions dicussed with patient.  Signs and Symptoms of skin cancer discussed with patient.  ABCDEs of melanoma reviewed with patient.  Patient encouraged to perform monthly skin exams.  UV precautions reviewed with patient.  Risks of non-melanoma skin cancer discussed with patient   Return to clinic in 2-3 months to recheck spot on buttocks.

## 2019-02-27 NOTE — LETTER
2/27/2019         RE: Ellie Lewis  [address]  [place] [address]        Dear Colleague,    Thank you for referring your patient, Ellie Lewis, to the [facility]. Please see a copy of my visit note below.    Ellie Lewis is a 69 year old year old female patient here today for recheck spot on buttocks. LOV biopsied right buttock which returned consistent with an inflamed seborrheic keratosis. She also notes new spot on leg.  Patient has no other skin complaints today.  Remainder of the HPI, Meds, PMH, Allergies, FH, and SH was reviewed in chart.    Pertinent Hx:   No personal history of skin cancer.   History reviewed. No pertinent past medical history.    History reviewed. No pertinent surgical history.     Family History   Problem Relation Age of Onset     Heart Murmur Mother      Alzheimer Disease Father        Social History     Socioeconomic History     Marital status:      Spouse name: Not on file     Number of children: Not on file     Years of education: Not on file     Highest education level: Not on file   Occupational History     Not on file   Social Needs     Financial resource strain: Not on file     Food insecurity:     Worry: Not on file     Inability: Not on file     Transportation needs:     Medical: Not on file     Non-medical: Not on file   Tobacco Use     Smoking status: Never Smoker     Smokeless tobacco: Never Used   Substance and Sexual Activity     Alcohol use: No     Drug use: No     Sexual activity: Yes     Partners: Male   Lifestyle     Physical activity:     Days per week: Not on file     Minutes per session: Not on file     Stress: Not on file   Relationships     Social connections:     Talks on phone: Not on file     Gets together: Not on file     Attends Islam service: Not on file     Active member of club or organization: Not on file     Attends meetings of clubs or organizations: Not on file     Relationship status: Not on file     Intimate  partner violence:     Fear of current or ex partner: Not on file     Emotionally abused: Not on file     Physically abused: Not on file     Forced sexual activity: Not on file   Other Topics Concern     Parent/sibling w/ CABG, MI or angioplasty before 65F 55M? No   Social History Narrative     Not on file       Outpatient Encounter Medications as of 2/27/2019   Medication Sig Dispense Refill     buPROPion (WELLBUTRIN SR) 100 MG 12 hr tablet TAKE 1 TABLET BY MOUTH TWO  TIMES DAILY 180 tablet 2     FLUoxetine (PROZAC) 40 MG capsule TAKE 1 CAPSULE BY MOUTH  DAILY 90 capsule 2     levothyroxine (SYNTHROID/LEVOTHROID) 100 MCG tablet TAKE 1 TABLET BY MOUTH  DAILY 90 tablet 3     No facility-administered encounter medications on file as of 2/27/2019.              Review Of Systems  Skin: As above  Eyes: negative  Ears/Nose/Throat: negative  Respiratory: No shortness of breath, dyspnea on exertion, cough, or hemoptysis  Cardiovascular: negative  Gastrointestinal: negative  Genitourinary: negative  Musculoskeletal: negative  Neurologic: negative  Psychiatric: negative  Hematologic/Lymphatic/Immunologic: negative  Endocrine: negative      O:   NAD, WDWN, Alert & Oriented, Mood & Affect wnl, Vitals stable   Here today alone   /78 (BP Location: Right arm, Patient Position: Sitting, Cuff Size: Adult Large)   Pulse 66   SpO2 98%    General appearance normal   Vitals stable   Alert, oriented and in no acute distress     Brown stuck on papule on leg   Brown thin plaque on right buttock      The remainder of the detailed exam was unremarkable; the following areas were examined:  scalp/hair, conjunctiva/lids, face, neck, lips/teeth, oral mucosa/gingiva, chest, back, abdomen, buttocks, digits/nails, RUE, LUE, RLE, LLE.      Eyes: Conjunctivae/lids:Normal     ENT: Lips: normal    MSK:Normal    Cardiovascular: peripheral edema none    Pulm: Breathing Normal    Neuro/Psych: Orientation:Normal; Mood/Affect:Normal  A/P:  1.  Inflamed seborrheic keratosis on right buttocks x 1  LN2:  Treated with LN2 for 5s for 1-2 cycles. Warned risks of blistering, pain, pigment change, scarring, and incomplete resolution.  Advised patient to return if lesions do not completely resolve.  Wound care sheet given.  2. Seborrheic keratosis   BENIGN LESIONS DISCUSSED WITH PATIENT:  I discussed the specifics of tumor, prognosis, and genetics of benign lesions.  I explained that treatment of these lesions would be purely cosmetic and not medically neccessary.  I discussed with patient different removal options including excision, cautery and /or laser.      Nature and genetics of benign skin lesions dicussed with patient.  Signs and Symptoms of skin cancer discussed with patient.  ABCDEs of melanoma reviewed with patient.  Patient encouraged to perform monthly skin exams.  UV precautions reviewed with patient.  Risks of non-melanoma skin cancer discussed with patient   Return to clinic in 2-3 months to recheck spot on buttocks.     Again, thank you for allowing me to participate in the care of your patient.        Sincerely,        Aurea Rivera PA-C

## 2019-02-27 NOTE — NURSING NOTE
"Initial /78 (BP Location: Right arm, Patient Position: Sitting, Cuff Size: Adult Large)   Pulse 66   SpO2 98%  Estimated body mass index is 28.29 kg/m  as calculated from the following:    Height as of 11/6/18: 1.651 m (5' 5\").    Weight as of 11/6/18: 77.1 kg (170 lb). .      "

## 2019-03-05 ENCOUNTER — OFFICE VISIT (OUTPATIENT)
Dept: FAMILY MEDICINE | Facility: CLINIC | Age: 69
End: 2019-03-05
Payer: MEDICARE

## 2019-03-05 ENCOUNTER — ANCILLARY PROCEDURE (OUTPATIENT)
Dept: GENERAL RADIOLOGY | Facility: CLINIC | Age: 69
End: 2019-03-05
Attending: FAMILY MEDICINE
Payer: MEDICARE

## 2019-03-05 VITALS
DIASTOLIC BLOOD PRESSURE: 83 MMHG | RESPIRATION RATE: 16 BRPM | SYSTOLIC BLOOD PRESSURE: 137 MMHG | BODY MASS INDEX: 29.44 KG/M2 | HEIGHT: 65 IN | WEIGHT: 176.7 LBS | HEART RATE: 64 BPM | TEMPERATURE: 98.4 F

## 2019-03-05 DIAGNOSIS — M25.552 HIP PAIN, LEFT: ICD-10-CM

## 2019-03-05 DIAGNOSIS — M25.552 HIP PAIN, LEFT: Primary | ICD-10-CM

## 2019-03-05 PROCEDURE — 73502 X-RAY EXAM HIP UNI 2-3 VIEWS: CPT | Mod: FY

## 2019-03-05 PROCEDURE — 99213 OFFICE O/P EST LOW 20 MIN: CPT | Performed by: FAMILY MEDICINE

## 2019-03-05 ASSESSMENT — MIFFLIN-ST. JEOR: SCORE: 1326.35

## 2019-03-05 NOTE — PROGRESS NOTES
"  SUBJECTIVE:   Ellie Lewis is a 69 year old female who presents to clinic today for the following health issues:      Left hip pain- Patient said she has been having hip pain for the past few months.   Patient said the pain has been worsening more with movements.   No injury   Worse with stairs  Hard to bend over   No tingling/numbness in the legs  Pinching feeling inside the hip area         Right hip placement 10 years ago     Review of systems:      Problem list and histories reviewed & adjusted, as indicated.  Additional history: as documented    Patient Active Problem List   Diagnosis     Hypothyroidism due to acquired atrophy of thyroid     Recurrent major depression resistant to treatment (H)     OCD (obsessive compulsive disorder)     Depression     Status post total replacement of right hip     Advanced care planning/counseling discussion     Recurrent major depressive disorder, in partial remission (H)     Current Outpatient Medications   Medication     buPROPion (WELLBUTRIN SR) 100 MG 12 hr tablet     FLUoxetine (PROZAC) 40 MG capsule     levothyroxine (SYNTHROID/LEVOTHROID) 100 MCG tablet     No current facility-administered medications for this visit.         Allergies   Allergen Reactions     Sulfa Drugs Rash       /83 (BP Location: Right arm, Patient Position: Sitting, Cuff Size: Adult Regular)   Pulse 64   Temp 98.4  F (36.9  C) (Tympanic)   Resp 16   Ht 1.657 m (5' 5.25\")   Wt 80.2 kg (176 lb 11.2 oz)   BMI 29.18 kg/m    GENERAL - Pt is alert and oriented in no acute distress.  Affect is appropriate. Good eye contact.  Normal gait  Back - No CVA tenderness. No paraspinous tenderness  Neuro - reflexes 2+ bilaterally. Sensation intact. LE Strength 5/5.   Back shows full extension, flexion, and lateral bending. No pain with palpation over bilateral paraspinous muscles. No central tenderness to palpation.  Negative straight leg raising test.   Left Hip - full ROM  Right hip - pain with " eversion  No trochanteric bursa tenderness  No skin changes     Left hip xray  I independently visualized the xray and my findings were moderate arthritis in the joint .   Radiology review pending.      Assessment/Plan -  (M25.552) Hip pain, left  (primary encounter diagnosis)  Comment: likely due to arthritis. She prefers to try PT first and, if not improving, will see ortho. The patient indicates understanding of these issues and agrees with the plan.   Plan: XR Hip Left 2-3 Views, [facility] PT, HAND, AND         CHIROPRACTIC REFERRAL          CHUCK Massey MD

## 2019-03-06 ENCOUNTER — THERAPY VISIT (OUTPATIENT)
Dept: PHYSICAL THERAPY | Facility: CLINIC | Age: 69
End: 2019-03-06
Payer: MEDICARE

## 2019-03-06 DIAGNOSIS — M25.552 HIP PAIN, LEFT: ICD-10-CM

## 2019-03-06 PROCEDURE — 97162 PT EVAL MOD COMPLEX 30 MIN: CPT | Mod: GP | Performed by: PHYSICAL THERAPIST

## 2019-03-06 PROCEDURE — 97110 THERAPEUTIC EXERCISES: CPT | Mod: GP | Performed by: PHYSICAL THERAPIST

## 2019-03-06 ASSESSMENT — ACTIVITIES OF DAILY LIVING (ADL)
WALKING_15_MINUTES_OR_GREATER: MODERATE DIFFICULTY
GOING_UP_1_FLIGHT_OF_STAIRS: MODERATE DIFFICULTY
DEEP_SQUATTING: EXTREME DIFFICULTY
RECREATIONAL_ACTIVITIES: MODERATE DIFFICULTY
STANDING_FOR_15_MINUTES: SLIGHT DIFFICULTY
HOS_ADL_SCORE(%): 51.47
HOS_ADL_ITEM_SCORE_TOTAL: 35
WALKING_INITIALLY: EXTREME DIFFICULTY
GETTING_INTO_AND_OUT_OF_A_BATHTUB: MODERATE DIFFICULTY
WALKING_DOWN_STEEP_HILLS: MODERATE DIFFICULTY
HEAVY_WORK: MODERATE DIFFICULTY
HOS_ADL_HIGHEST_POTENTIAL_SCORE: 68
GOING_DOWN_1_FLIGHT_OF_STAIRS: SLIGHT DIFFICULTY
TWISTING/PIVOTING_ON_INVOLVED_LEG: EXTREME DIFFICULTY
GETTING_INTO_AND_OUT_OF_AN_AVERAGE_CAR: MODERATE DIFFICULTY
ROLLING_OVER_IN_BED: MODERATE DIFFICULTY
HOW_WOULD_YOU_RATE_YOUR_CURRENT_LEVEL_OF_FUNCTION_DURING_YOUR_USUAL_ACTIVITIES_OF_DAILY_LIVING_FROM_0_TO_100_WITH_100_BEING_YOUR_LEVEL_OF_FUNCTION_PRIOR_TO_YOUR_HIP_PROBLEM_AND_0_BEING_THE_INABILITY_TO_PERFORM_ANY_OF_YOUR_USUAL_DAILY_ACTIVITIES?: 40
LIGHT_TO_MODERATE_WORK: SLIGHT DIFFICULTY
STEPPING_UP_AND_DOWN_CURBS: SLIGHT DIFFICULTY
WALKING_UP_STEEP_HILLS: EXTREME DIFFICULTY
PUTTING_ON_SOCKS_AND_SHOES: MODERATE DIFFICULTY
WALKING_APPROXIMATELY_10_MINUTES: SLIGHT DIFFICULTY
SITTING_FOR_15_MINUTES: SLIGHT DIFFICULTY
HOS_ADL_COUNT: 17

## 2019-03-06 NOTE — LETTER
DEPARTMENT OF HEALTH AND HUMAN SERVICES  CENTERS FOR MEDICARE & MEDICAID SERVICES    PLAN/UPDATED PLAN OF PROGRESS FOR OUTPATIENT REHABILITATION    PATIENTS NAME:  Ellie Lewis   : 1950  PROVIDER NUMBER:    [id number]  HICN:  [id number]   PROVIDER [facility]: [facility]  MEDICAL RECORD NUMBER: [id number]   START OF CARE DATE:  SOC Date: 19   TYPE:  PT    PRIMARY/TREATMENT DIAGNOSIS: (Pertinent Medical Diagnosis)  Hip pain, left    VISITS FROM START OF CARE:  Rxs Used: 1     [facility] Initial Evaluation  Subjective:  The history is provided by the patient and medical records.   Ellie Lewis is a 69 year old female with a left hip condition.  Condition occurred with:  Insidious onset.  Condition occurred: for unknown reasons.  This is a new condition  Pain started gradually several months ago, saw MD for this on 3/5/2019.    Patient reports pain:  Joint.  Radiates to:  No radiation.  Pain is described as aching and sharp  and reported as 3/10.  Associated symptoms:  Loss of motion/stiffness. Pain is worse in the A.M..  Symptoms are exacerbated by certain positions, ascending stairs and transfers and relieved by activity/movement and rest.  Since onset symptoms are gradually worsening.  Special tests:  X-ray (mild to moderate DJD).      General health as reported by patient is good.  Pertinent medical history includes:  Depression, incontinence, mental illness, numbness/tingling, overweight and thyroid problems.  Medical allergies: yes (sulpha).  Other surgeries include:  Orthopedic surgery (right hip replacement).  Current medications:  Thyroid medication.  Current occupation is retired.    Primary job tasks include:  Lifting, repetitive tasks and driving. Barriers include:  None as reported by the patient.Red flags:  None as reported by the patient.       Hip Evaluation  Hip PROM:    Flexion: Left: > 100, no pain  Right:  Internal Rotation: Left: 20, pain at  end-range   Right:  External Rotation: Left: 40, no pain   Right:        Hip Strength:    Flexion:   Left: 4+/5   +  Pain:    Extension:  Left: 4+/5  -  Pain:  Abduction:  Left: 4+/5    -   Pain:  Adduction:  Left: 5/5   -   Pain:      Hip Special Testing:    Left hip positive for the following special tests:  Fadir/Labrum (pain)  Left hip negative for the following special tests:  PiriformisFabre left hip: ciuld not get into position.      PATIENTS NAME:  Ellie Lewis   : 1950            Hip Palpation:    Left hip tenderness not present at:  Greater Trachanter; IT Band; hip flexors or Piriformis    Assessment/Plan:    Patient is a 69 year old female with left side hip complaints.    Patient has the following significant findings with corresponding treatment plan.                Diagnosis 1:  Left hip DJD  Pain -  manual therapy and self management  Decreased ROM/flexibility - manual therapy and therapeutic exercise  Decreased strength - therapeutic exercise and therapeutic activities    Therapy Evaluation Codes:   1) History comprised of:   Personal factors that impact the plan of care:      Age and Time since onset of symptoms.    Comorbidity factors that impact the plan of care are:      Depression, Mental illness and Overweight.     Medications impacting care: Anti-depressant.  2) Examination of Body Systems comprised of:   Body structures and functions that impact the plan of care:      Hip.   Activity limitations that impact the plan of care are:      Driving, Sitting, Stairs and Laying down.  3) Clinical presentation characteristics are:   Evolving/Changing.  4) Decision-Making    Moderate complexity using standardized patient assessment instrument and/or measureable assessment of functional outcome.  Cumulative Therapy Evaluation is: Moderate complexity.    Previous and current functional limitations:  (See Goal Flow Sheet for this information)    Short term and Long term goals: (See Goal Flow  "Sheet for this information)     Communication ability:  Patient appears to be able to clearly communicate and understand verbal and written communication and follow directions correctly.  Treatment Explanation - The following has been discussed with the patient:   RX ordered/plan of care  Anticipated outcomes  Possible risks and side effects  This patient would benefit from PT intervention to resume normal activities.   Rehab potential is excellent.              PATIENTS NAME:  Ellie Lewis   : 1950      Frequency:  1 X week, once daily  Duration:  for 8 weeks  Discharge Plan:  Achieve all LTG.  Independent in home treatment program.  Reach maximal therapeutic benefit.      Caregiver Signature/Credentials _____________________________ Date ________       Treating Provider: Ceci Pressley PT   I have reviewed and certified the need for these services and plan of treatment while under my care.        PHYSICIAN'S SIGNATURE:   _________________________________________  Date___________   Aubree Massey MD    Certification period:  Beginning of Cert date period: 19 to  End of Cert period date: 19     Functional Level Progress Report: Please see attached \"Goal Flow sheet for Functional level.\"    ____X____ Continue Services or       ________ DC Services                Service dates: From  SOC Date: 19 date to present                         "

## 2019-03-07 NOTE — PROGRESS NOTES
[facility] Initial Evaluation  Subjective:  The history is provided by the patient and medical records.   Ellie Lewis is a 69 year old female with a left hip condition.  Condition occurred with:  Insidious onset.  Condition occurred: for unknown reasons.  This is a new condition  Pain started gradually several months ago, saw MD for this on 3/5/2019.    Patient reports pain:  Joint.  Radiates to:  No radiation.  Pain is described as aching and sharp  and reported as 3/10.  Associated symptoms:  Loss of motion/stiffness. Pain is worse in the A.M..  Symptoms are exacerbated by certain positions, ascending stairs and transfers and relieved by activity/movement and rest.  Since onset symptoms are gradually worsening.  Special tests:  X-ray (mild to moderate DJD).      General health as reported by patient is good.  Pertinent medical history includes:  Depression, incontinence, mental illness, numbness/tingling, overweight and thyroid problems.  Medical allergies: yes (sulpha).  Other surgeries include:  Orthopedic surgery (right hip replacement).  Current medications:  Thyroid medication.  Current occupation is retired.    Primary job tasks include:  Lifting, repetitive tasks and driving.    Barriers include:  None as reported by the patient.    Red flags:  None as reported by the patient.                        Objective:  System                                           Hip Evaluation  Hip PROM:    Flexion: Left: > 100, no pain  Right:        Internal Rotation: Left: 20, pain at end-range   Right:  External Rotation: Left: 40, no pain   Right:              Hip Strength:    Flexion:   Left: 4+/5   +  Pain:                      Extension:  Left: 4+/5  -  Pain:  Abduction:  Left: 4+/5    -   Pain:  Adduction:  Left: 5/5   -   Pain:                Hip Special Testing:    Left hip positive for the following special tests:  Fadir/Labrum (pain)  Left hip negative for the following special tests:   PiriformisFabre left hip: ciuld not get into position.      Hip Palpation:      Left hip tenderness not present at:  Greater Trachanter; IT Band; hip flexors or Piriformis               General     ROS    Assessment/Plan:    Patient is a 69 year old female with left side hip complaints.    Patient has the following significant findings with corresponding treatment plan.                Diagnosis 1:  Left hip DJD  Pain -  manual therapy and self management  Decreased ROM/flexibility - manual therapy and therapeutic exercise  Decreased strength - therapeutic exercise and therapeutic activities    Therapy Evaluation Codes:   1) History comprised of:   Personal factors that impact the plan of care:      Age and Time since onset of symptoms.    Comorbidity factors that impact the plan of care are:      Depression, Mental illness and Overweight.     Medications impacting care: Anti-depressant.  2) Examination of Body Systems comprised of:   Body structures and functions that impact the plan of care:      Hip.   Activity limitations that impact the plan of care are:      Driving, Sitting, Stairs and Laying down.  3) Clinical presentation characteristics are:   Evolving/Changing.  4) Decision-Making    Moderate complexity using standardized patient assessment instrument and/or measureable assessment of functional outcome.  Cumulative Therapy Evaluation is: Moderate complexity.    Previous and current functional limitations:  (See Goal Flow Sheet for this information)    Short term and Long term goals: (See Goal Flow Sheet for this information)     Communication ability:  Patient appears to be able to clearly communicate and understand verbal and written communication and follow directions correctly.  Treatment Explanation - The following has been discussed with the patient:   RX ordered/plan of care  Anticipated outcomes  Possible risks and side effects  This patient would benefit from PT intervention to resume normal  activities.   Rehab potential is excellent.    Frequency:  1 X week, once daily  Duration:  for 8 weeks  Discharge Plan:  Achieve all LTG.  Independent in home treatment program.  Reach maximal therapeutic benefit.    Please refer to the daily flowsheet for treatment today, total treatment time and time spent performing 1:1 timed codes.

## 2019-04-03 ENCOUNTER — TELEPHONE (OUTPATIENT)
Dept: FAMILY MEDICINE | Facility: CLINIC | Age: 69
End: 2019-04-03

## 2019-04-03 DIAGNOSIS — M25.552 HIP PAIN, LEFT: Primary | ICD-10-CM

## 2019-04-03 NOTE — TELEPHONE ENCOUNTER
Ortho referral placed - she can tell them where she would like to go geographically when they call her  C. Joaquin Massey MD

## 2019-04-03 NOTE — TELEPHONE ENCOUNTER
"Reason for Call: Request for an order or referral:    Order or referral being requested: Stephanie requesting referral for Orthopedic for her hip for \"somewhere in [place] or [facility]\".  States that her pain is getting worst and would like to see someone for it.  ..Leny Appiah    Date needed: at your convenience    Has the patient been seen by the PCP for this problem? YES 3/5/19    Phone number Patient can be reached at:  Home number on file  [phone]    Best Time:  Any time    Can we leave a detailed message on this number?  YES    Call taken on 4/3/2019 at 11:27 AM by Leny Appiah    "

## 2019-04-08 ENCOUNTER — OFFICE VISIT (OUTPATIENT)
Dept: ORTHOPEDICS | Facility: CLINIC | Age: 69
End: 2019-04-08
Payer: MEDICARE

## 2019-04-08 ENCOUNTER — ANCILLARY PROCEDURE (OUTPATIENT)
Dept: GENERAL RADIOLOGY | Facility: CLINIC | Age: 69
End: 2019-04-08
Attending: ORTHOPAEDIC SURGERY
Payer: MEDICARE

## 2019-04-08 VITALS
OXYGEN SATURATION: 98 % | WEIGHT: 178.5 LBS | DIASTOLIC BLOOD PRESSURE: 70 MMHG | RESPIRATION RATE: 16 BRPM | SYSTOLIC BLOOD PRESSURE: 121 MMHG | HEART RATE: 70 BPM | HEIGHT: 65 IN | BODY MASS INDEX: 29.74 KG/M2

## 2019-04-08 DIAGNOSIS — M25.552 HIP PAIN, LEFT: ICD-10-CM

## 2019-04-08 DIAGNOSIS — M16.12 PRIMARY OSTEOARTHRITIS OF LEFT HIP: Primary | ICD-10-CM

## 2019-04-08 PROCEDURE — 99203 OFFICE O/P NEW LOW 30 MIN: CPT | Performed by: ORTHOPAEDIC SURGERY

## 2019-04-08 PROCEDURE — 72170 X-RAY EXAM OF PELVIS: CPT

## 2019-04-08 ASSESSMENT — PAIN SCALES - GENERAL: PAINLEVEL: MILD PAIN (3)

## 2019-04-08 ASSESSMENT — MIFFLIN-ST. JEOR: SCORE: 1334.51

## 2019-04-08 NOTE — LETTER
"    4/8/2019         RE: Ellie Lewis  [address]  [facility] [address]        Dear Colleague,    Thank you for referring your patient, Ellie Lewis, to the [facility]. Please see a copy of my visit note below.    Chief Complaint:   Chief Complaint   Patient presents with     Left Hip - Pain     Onset: 2/2019. NKI. Pain just came on and has gotten worse. Pain is deep in the groin area. Pain with walking/climbing stairs. She uses heat and does exercises. Hx of right total hip arthroplasty about 10-15 years about.      Ellie Lewis is seen today in the [facility] for evaluation of left hip pain at the request of Dr. Aubree Massey MD      HISTORY OF PRESENT ILLNESS    Ellie Lewis is a 69 year old female seen for evaluation of ongoing left hip pain with no known injury.   Pain has been present for 2 months. Pain just started and getting worse. Locates pain to the left groin and \"inside\" the hip. Varies day to day. Some days worse than others. Aggravated with walking, stairs. Also has low back pain, which seem to irritate each other. Denies numbness and tingling. Ok at rest. Pain with donning shoes/socks, getting in/out of car, sit to stand. Had prior right hip osteoarthritis and eventual total hip arthroplasty in [place] ~10+  Years ago, but this pain feels different. Has done Physical Therapy and home exercise program. Has not taken any medications for the pain.    Present symptoms: pain with walking  Pain severity: 3/10  Pain quality: shooting  Frequency of symptoms: frequently  Exacerbating Factors: weight bearing, stairs, [id number], in and out of car  Relieving Factors: rest, sitting, laying on her back.  Night Pain: Yes  Pain while at rest: No   Associated numbness or tingling: No     Orthopedic PMH: right total hip arthroplasty [place] 10-15 years ago.    Other PMH:  has no past medical history of Basal cell carcinoma, Malignant " melanoma (H), or Squamous cell carcinoma.  Patient Active Problem List    Diagnosis Date Noted     Advanced care planning/counseling discussion 11/21/2017     Priority: Medium     Advance Care Planning 11/21/2017: ACP Review of Chart / Resources Provided:  Reviewed chart for advance care plan.  Ellie Lewis has been provided information and resources to begin or update their advance care plan.  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    Medications:   Current Outpatient Medications:      buPROPion (WELLBUTRIN SR) 100 MG 12 hr tablet, TAKE 1 TABLET BY MOUTH TWO  TIMES DAILY, Disp: 180 tablet, Rfl: 2     FLUoxetine (PROZAC) 40 MG capsule, TAKE 1 CAPSULE BY MOUTH  DAILY, Disp: 90 capsule, Rfl: 2     levothyroxine (SYNTHROID/LEVOTHROID) 100 MCG tablet, TAKE 1 TABLET BY MOUTH  DAILY, Disp: 90 tablet, Rfl: 3    Allergies:   Allergies   Allergen Reactions     Sulfa Drugs Rash       Social Hx: retired.  reports that she has never smoked. She has never used smokeless tobacco. She reports that she does not drink alcohol or use drugs.    Family Hx: family history includes Alzheimer Disease in her father; Heart Murmur in her mother.    REVIEW OF SYSTEMS:  10 point ROS neg other than the symptoms noted above in the HPI and PAST MEDICAL HISTORY. Notables,  CONSTITUTIONAL:NEGATIVE for fever, chills, change in weight  INTEGUMENTARY/SKIN: NEGATIVE for worrisome rashes, moles or lesions  MUSCULOSKELETAL:See HPI  "above  NEURO: NEGATIVE for weakness, dizziness or paresthesias    PHYSICAL EXAM:  /70   Pulse 70   Resp 16   Ht 1.657 m (5' 5.25\")   Wt 81 kg (178 lb 8 oz)   SpO2 98%   BMI 29.48 kg/m      GENERAL APPEARANCE: healthy, alert, no distress  SKIN: no suspicious lesions or rashes  NEURO: Normal strength and tone, mentation intact and speech normal  PSYCH:  mentation appears normal and affect normal  RESPIRATORY: No increased work of breathing.  LYMPH: no palpable inguinal lymph nodes.    BILATERAL LOWER EXTREMITIES:  Gait: antalgic favoring left, bilateral intoeing, quite noticeable.  No gross deformities or masses.  No Quad atrophy, strength normal.  Intact sensation deep peroneal nerve, superficial peroneal nerve, med/lat tibial nerve, sural nerve, saphenous nerve  Intact EHL, EDL, TA, FHL, GS, quadriceps hamstrings and hip flexors  Toes warm and well perfused, brisk capillary refill. Palpable 2+ dp pulses.  Bilateral calf soft and nttp or squeeze.  DTRs: achilles 2+, patella 2+.  Edema: trace      LEFT HIP EXAM:      Palpation: Tender:   left greater trochanter, left gluteus medius, left groin, left adductors, mid and left low back, SI joint.  Strength:  flexion: 4/5  Special tests:  Irritability (flexion/adduction/internal rotation) positive.  Hip range of motion: Internal rotation: 15, External rotation: 45, Flexion 100, pain with extremes of rotation  Leg lengths: grossly symmetric at medial malleolus.    Lumbar range of motion: flexion to touch toes, extension adequte, side bend: adequate  Seated SLR: negative  Supine SLR: negative  Sensation:normal  Sciatic Notch tenderness: negative      RIGHT HIP EXAM:    Posterolateral based incisional scar  Palpation: Tender:   None.  Strength:  full strength  Special tests:  Irritability (flexion/adduction/internal rotation) negative.  Hip range of motion: normal      X-RAY: AP pelvis 4/8/2019, and AP/Lateral views left hip from 3/5/2019 were reviewed in clinic " "today. No obvious fractures or dislocations. Right total hip arthroplasty in place, cemented femoral component only partially visualized, pressfit acetabular component with screws. Left hip with mild-moderate degenerative changes. Degenerative changes of the visualized lumbar spine.      Impression: 70yo female with left hip and low back pain, mild-moderate left hip primary osteoarthritis.    Plan:   * discussed pain in groin/hip likely from mild-moderate left hip arthritis. This is wearing of the cartilage within the hip joint either due to normal \"wear and tear\" or following an injury. Any low back / buttock / radiating pain likely coming from the low back.  *  treatment options for hip arthritis and pain include: do nothing, NSAIDS, activity modification, Physical Therapy, injections, total hip arthroplasty. Risks and benefits of each discussed.   * did discuss patient is a candidate for total hip arthroplasty, and offered total hip arthroplasty to patient. Total hip arthroplasty will only attempt to provide pain relief from hip related arthritis only and not any pain from the low back. Any low back pain likely to continue.  *  Discussed risks of surgery include, but not limited to: bleeding, infection, pain, scar, damage to adjacent structures (e.g. Nerves, blood vessels, bone, cartilage), temporary or permanent nerve damage, recurrence of symptoms, implant dislocation, implant failure, implant infection, unequal limb lengths, stiffness, need for further surgery, blood clots, pulmonary embolism, risks of anesthesia, and death.  * patient would like to proceed with : image-guided left hip cortisone injection.    * return to clinic as needed.            Rah Mcdermott M.D., M.S.  Dept. of Orthopaedic Surgery  [facility]          Again, thank you for allowing me to participate in the care of your patient.        Sincerely,        Rah Mcdermott MD    "

## 2019-04-08 NOTE — PROGRESS NOTES
"Chief Complaint:   Chief Complaint   Patient presents with     Left Hip - Pain     Onset: 2/2019. NKI. Pain just came on and has gotten worse. Pain is deep in the groin area. Pain with walking/climbing stairs. She uses heat and does exercises. Hx of right total hip arthroplasty about 10-15 years about.      Ellie Lewis is seen today in the [facility] for evaluation of left hip pain at the request of Dr. Aubree Massey MD      HISTORY OF PRESENT ILLNESS    Ellie Lewis is a 69 year old female seen for evaluation of ongoing left hip pain with no known injury.   Pain has been present for 2 months. Pain just started and getting worse. Locates pain to the left groin and \"inside\" the hip. Varies day to day. Some days worse than others. Aggravated with walking, stairs. Also has low back pain, which seem to irritate each other. Denies numbness and tingling. Ok at rest. Pain with donning shoes/socks, getting in/out of car, sit to stand. Had prior right hip osteoarthritis and eventual total hip arthroplasty in [place] ~10+  Years ago, but this pain feels different. Has done Physical Therapy and home exercise program. Has not taken any medications for the pain.    Present symptoms: pain with walking  Pain severity: 3/10  Pain quality: shooting  Frequency of symptoms: frequently  Exacerbating Factors: weight bearing, stairs, [id number], in and out of car  Relieving Factors: rest, sitting, laying on her back.  Night Pain: Yes  Pain while at rest: No   Associated numbness or tingling: No     Orthopedic PMH: right total hip arthroplasty [place] 10-15 years ago.    Other PMH:  has no past medical history of Basal cell carcinoma, Malignant melanoma (H), or Squamous cell carcinoma.  Patient Active Problem List    Diagnosis Date Noted     Advanced care planning/counseling discussion 11/21/2017     Priority: Medium     Advance Care Planning 11/21/2017: ACP Review of Chart / Resources Provided:  " "Reviewed chart for advance care plan.  Ellie Lewis has been provided information and resources to begin or update their advance care plan.  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    Medications:   Current Outpatient Medications:      buPROPion (WELLBUTRIN SR) 100 MG 12 hr tablet, TAKE 1 TABLET BY MOUTH TWO  TIMES DAILY, Disp: 180 tablet, Rfl: 2     FLUoxetine (PROZAC) 40 MG capsule, TAKE 1 CAPSULE BY MOUTH  DAILY, Disp: 90 capsule, Rfl: 2     levothyroxine (SYNTHROID/LEVOTHROID) 100 MCG tablet, TAKE 1 TABLET BY MOUTH  DAILY, Disp: 90 tablet, Rfl: 3    Allergies:   Allergies   Allergen Reactions     Sulfa Drugs Rash       Social Hx: retired.  reports that she has never smoked. She has never used smokeless tobacco. She reports that she does not drink alcohol or use drugs.    Family Hx: family history includes Alzheimer Disease in her father; Heart Murmur in her mother.    REVIEW OF SYSTEMS:  10 point ROS neg other than the symptoms noted above in the HPI and PAST MEDICAL HISTORY. Notables,  CONSTITUTIONAL:NEGATIVE for fever, chills, change in weight  INTEGUMENTARY/SKIN: NEGATIVE for worrisome rashes, moles or lesions  MUSCULOSKELETAL:See HPI above  NEURO: NEGATIVE for weakness, dizziness or paresthesias    PHYSICAL EXAM:  /70   Pulse 70   Resp 16   Ht 1.657 m (5' 5.25\")   Wt 81 kg (178 lb 8 oz)   SpO2 98%   BMI 29.48 kg/m     GENERAL APPEARANCE: healthy, alert, no distress  SKIN: no " suspicious lesions or rashes  NEURO: Normal strength and tone, mentation intact and speech normal  PSYCH:  mentation appears normal and affect normal  RESPIRATORY: No increased work of breathing.  LYMPH: no palpable inguinal lymph nodes.    BILATERAL LOWER EXTREMITIES:  Gait: antalgic favoring left, bilateral intoeing, quite noticeable.  No gross deformities or masses.  No Quad atrophy, strength normal.  Intact sensation deep peroneal nerve, superficial peroneal nerve, med/lat tibial nerve, sural nerve, saphenous nerve  Intact EHL, EDL, TA, FHL, GS, quadriceps hamstrings and hip flexors  Toes warm and well perfused, brisk capillary refill. Palpable 2+ dp pulses.  Bilateral calf soft and nttp or squeeze.  DTRs: achilles 2+, patella 2+.  Edema: trace      LEFT HIP EXAM:      Palpation: Tender:   left greater trochanter, left gluteus medius, left groin, left adductors, mid and left low back, SI joint.  Strength:  flexion: 4/5  Special tests:  Irritability (flexion/adduction/internal rotation) positive.  Hip range of motion: Internal rotation: 15, External rotation: 45, Flexion 100, pain with extremes of rotation  Leg lengths: grossly symmetric at medial malleolus.    Lumbar range of motion: flexion to touch toes, extension adequte, side bend: adequate  Seated SLR: negative  Supine SLR: negative  Sensation:normal  Sciatic Notch tenderness: negative      RIGHT HIP EXAM:    Posterolateral based incisional scar  Palpation: Tender:   None.  Strength:  full strength  Special tests:  Irritability (flexion/adduction/internal rotation) negative.  Hip range of motion: normal      X-RAY: AP pelvis 4/8/2019, and AP/Lateral views left hip from 3/5/2019 were reviewed in clinic today. No obvious fractures or dislocations. Right total hip arthroplasty in place, cemented femoral component only partially visualized, pressfit acetabular component with screws. Left hip with mild-moderate degenerative changes. Degenerative changes of the  "visualized lumbar spine.      Impression: 68yo female with left hip and low back pain, mild-moderate left hip primary osteoarthritis.    Plan:   * discussed pain in groin/hip likely from mild-moderate left hip arthritis. This is wearing of the cartilage within the hip joint either due to normal \"wear and tear\" or following an injury. Any low back / buttock / radiating pain likely coming from the low back.  *  treatment options for hip arthritis and pain include: do nothing, NSAIDS, activity modification, Physical Therapy, injections, total hip arthroplasty. Risks and benefits of each discussed.   * did discuss patient is a candidate for total hip arthroplasty, and offered total hip arthroplasty to patient. Total hip arthroplasty will only attempt to provide pain relief from hip related arthritis only and not any pain from the low back. Any low back pain likely to continue.  *  Discussed risks of surgery include, but not limited to: bleeding, infection, pain, scar, damage to adjacent structures (e.g. Nerves, blood vessels, bone, cartilage), temporary or permanent nerve damage, recurrence of symptoms, implant dislocation, implant failure, implant infection, unequal limb lengths, stiffness, need for further surgery, blood clots, pulmonary embolism, risks of anesthesia, and death.  * patient would like to proceed with : image-guided left hip cortisone injection.    * return to clinic as needed.            Rah Mcdermott M.D., M.S.  Dept. of Orthopaedic Surgery  [facility]        "

## 2019-04-17 ENCOUNTER — OFFICE VISIT (OUTPATIENT)
Dept: ORTHOPEDICS | Facility: CLINIC | Age: 69
End: 2019-04-17
Payer: MEDICARE

## 2019-04-17 VITALS
BODY MASS INDEX: 29.66 KG/M2 | WEIGHT: 178 LBS | SYSTOLIC BLOOD PRESSURE: 115 MMHG | HEIGHT: 65 IN | DIASTOLIC BLOOD PRESSURE: 75 MMHG

## 2019-04-17 DIAGNOSIS — M25.552 LEFT HIP PAIN: Primary | ICD-10-CM

## 2019-04-17 DIAGNOSIS — M16.12 PRIMARY OSTEOARTHRITIS OF LEFT HIP: ICD-10-CM

## 2019-04-17 PROCEDURE — 20611 DRAIN/INJ JOINT/BURSA W/US: CPT | Mod: LT | Performed by: FAMILY MEDICINE

## 2019-04-17 RX ORDER — ROPIVACAINE HYDROCHLORIDE 5 MG/ML
3 INJECTION, SOLUTION EPIDURAL; INFILTRATION; PERINEURAL
Status: DISCONTINUED | OUTPATIENT
Start: 2019-04-17 | End: 2019-09-07 | Stop reason: ALTCHOICE

## 2019-04-17 RX ORDER — TRIAMCINOLONE ACETONIDE 40 MG/ML
40 INJECTION, SUSPENSION INTRA-ARTICULAR; INTRAMUSCULAR
Status: DISCONTINUED | OUTPATIENT
Start: 2019-04-17 | End: 2019-09-07 | Stop reason: ALTCHOICE

## 2019-04-17 RX ADMIN — ROPIVACAINE HYDROCHLORIDE 3 ML: 5 INJECTION, SOLUTION EPIDURAL; INFILTRATION; PERINEURAL at 10:54

## 2019-04-17 RX ADMIN — TRIAMCINOLONE ACETONIDE 40 MG: 40 INJECTION, SUSPENSION INTRA-ARTICULAR; INTRAMUSCULAR at 10:54

## 2019-04-17 ASSESSMENT — MIFFLIN-ST. JEOR: SCORE: 1332.24

## 2019-04-17 NOTE — PROGRESS NOTES
Ellie Lewis  :  1950  DOS: 2019  MRN: [id number]    [facility] Procedure    Ultrasound Guided Left Intra-Articular Hip Injection    Clinical History: Left hip pain that began 2019. Pain deep in the hip. Pain with activity stairs, sit to stand, putting on shoes and socks, and getting in and out of a car    Diagnosis:   1. Left hip pain    2. Primary osteoarthritis of left hip      Referring Physician: Dr. Rah Mcdermott  Large Joint Injection/Arthocentesis: L hip joint  Date/Time: 2019 10:54 AM  Performed by: Fox Bishop DO  Authorized by: Fox Bishop DO     Indications:  Pain and diagnostic evaluation  Needle Size:  22 G  Guidance: ultrasound    Approach:  Anterior  Location:  Hip        Site:  L hip joint    Medications:  3 mL ropivacaine 5 MG/ML; 40 mg triamcinolone 40 MG/ML  Outcome:  Tolerated well, no immediate complications  Procedure discussed: discussed risks, benefits, and alternatives    Consent Given by:  Patient  Prep: patient was prepped and draped in usual sterile fashion     4 ml's of 1% lidocaine was used as local anesthetic prior to injection      Impression:  Successful Left intra-articular hip injection.        Plan:  Follow up as directed with Dr Mcdermott  Expectations and goals of CSI reviewed  Often 2-3 days for steroid effect, and can take up to two weeks for maximum effect  We discussed modified progressive pain-free activity as tolerated  Do not overuse in first two weeks if feeling better due to concern for vulnerability while steroid is working  Supportive care reviewed  All questions were answered today  Contact us with additional questions or concerns  Signs and sx of concern reviewed      Fox Bishop DO, CAKARLEE  Primary Care Sports Medicine  [facility]

## 2019-04-17 NOTE — LETTER
2019         RE: Ellie Lewis  [address]  [facility] [address]        Dear Colleague,    Thank you for referring your patient, Ellie Lewis, to the [facility]. Please see a copy of my visit note below.    Ellie Lewis  :  1950  DOS: 2019  MRN: [id number]    Sports Medicine Clinic Procedure    Ultrasound Guided Left Intra-Articular Hip Injection    Clinical History: Left hip pain that began 2019. Pain deep in the hip. Pain with activity stairs, sit to stand, putting on shoes and socks, and getting in and out of a car    Diagnosis:   1. Left hip pain    2. Primary osteoarthritis of left hip      Referring Physician: Dr. Rah Mcdermott  Large Joint Injection/Arthocentesis: L hip joint  Date/Time: 2019 10:54 AM  Performed by: Fox Bishop DO  Authorized by: Fox Bishop DO     Indications:  Pain and diagnostic evaluation  Needle Size:  22 G  Guidance: ultrasound    Approach:  Anterior  Location:  Hip        Site:  L hip joint    Medications:  3 mL ropivacaine 5 MG/ML; 40 mg triamcinolone 40 MG/ML  Outcome:  Tolerated well, no immediate complications  Procedure discussed: discussed risks, benefits, and alternatives    Consent Given by:  Patient  Prep: patient was prepped and draped in usual sterile fashion     4 ml's of 1% lidocaine was used as local anesthetic prior to injection      Impression:  Successful Left intra-articular hip injection.        Plan:  Follow up as directed with Dr Mcdermott  Expectations and goals of CSI reviewed  Often 2-3 days for steroid effect, and can take up to two weeks for maximum effect  We discussed modified progressive pain-free activity as tolerated  Do not overuse in first two weeks if feeling better due to concern for vulnerability while steroid is working  Supportive care reviewed  All questions were answered today  Contact us with additional questions or concerns  Signs and sx of concern  reviewed      Fox Bishop DO, IBRAHIMA  Primary Care Sports Medicine  [facility]           Again, thank you for allowing me to participate in the care of your patient.        Sincerely,        Fox Bishop DO

## 2019-09-06 DIAGNOSIS — E03.9 ACQUIRED HYPOTHYROIDISM: ICD-10-CM

## 2019-09-06 RX ORDER — LEVOTHYROXINE SODIUM 100 UG/1
TABLET ORAL
Qty: 90 TABLET | Refills: 0 | Status: SHIPPED | OUTPATIENT
Start: 2019-09-06 | End: 2019-11-12

## 2019-09-06 NOTE — TELEPHONE ENCOUNTER
"Requested Prescriptions   Pending Prescriptions Disp Refills     levothyroxine (SYNTHROID/LEVOTHROID) 100 MCG tablet [Pharmacy Med Name: LEVOTHYROXINE  0.1MG  TAB]  Last Written Prescription Date:  11/6/18  Last Fill Quantity: 90,  # refills: 3   Last office visit: 3/5/2019 with prescribing provider:  kim   Future Office Visit:   Next 5 appointments (look out 90 days)    Sep 07, 2019  9:20 AM CDT  Return Visit with Fox Bishop DO  [facility] [place] ([facility]) [address]  [place] [address]  [phone]          90 tablet 3     Sig: TAKE 1 TABLET BY MOUTH  DAILY       Thyroid Protocol Passed - 9/6/2019  4:01 AM        Passed - Patient is 12 years or older        Passed - Recent (12 mo) or future (30 days) visit within the authorizing provider's specialty     Patient had office visit in the last 12 months or has a visit in the next 30 days with authorizing provider or within the authorizing provider's specialty.  See \"Patient Info\" tab in inbasket, or \"Choose Columns\" in Meds & Orders section of the refill encounter.              Passed - Medication is active on med list        Passed - Normal TSH on file in past 12 months     Recent Labs   Lab Test 11/06/18  0902   TSH 1.79              Passed - No active pregnancy on record     If patient is pregnant or has had a positive pregnancy test, please check TSH.          Passed - No positive pregnancy test in past 12 months     If patient is pregnant or has had a positive pregnancy test, please check TSH.            "

## 2019-09-06 NOTE — TELEPHONE ENCOUNTER
Prescription approved per [facility] Refill Protocol.  Vamsi Zhao RN

## 2019-09-07 ENCOUNTER — OFFICE VISIT (OUTPATIENT)
Dept: ORTHOPEDICS | Facility: CLINIC | Age: 69
End: 2019-09-07
Payer: MEDICARE

## 2019-09-07 VITALS
DIASTOLIC BLOOD PRESSURE: 76 MMHG | SYSTOLIC BLOOD PRESSURE: 135 MMHG | WEIGHT: 170 LBS | HEIGHT: 65 IN | BODY MASS INDEX: 28.32 KG/M2

## 2019-09-07 DIAGNOSIS — M16.12 PRIMARY OSTEOARTHRITIS OF LEFT HIP: ICD-10-CM

## 2019-09-07 DIAGNOSIS — M25.552 LEFT HIP PAIN: Primary | ICD-10-CM

## 2019-09-07 PROCEDURE — 20611 DRAIN/INJ JOINT/BURSA W/US: CPT | Mod: LT | Performed by: FAMILY MEDICINE

## 2019-09-07 PROCEDURE — 99213 OFFICE O/P EST LOW 20 MIN: CPT | Mod: 25 | Performed by: FAMILY MEDICINE

## 2019-09-07 RX ADMIN — TRIAMCINOLONE ACETONIDE 40 MG: 40 INJECTION, SUSPENSION INTRA-ARTICULAR; INTRAMUSCULAR at 09:50

## 2019-09-07 RX ADMIN — ROPIVACAINE HYDROCHLORIDE 3 ML: 5 INJECTION, SOLUTION EPIDURAL; INFILTRATION; PERINEURAL at 09:50

## 2019-09-07 ASSESSMENT — MIFFLIN-ST. JEOR: SCORE: 1295.95

## 2019-09-07 NOTE — LETTER
"    2019         RE: Ellie Lewis  [address]  [facility] [address]        Dear Colleague,    Thank you for referring your patient, Ellie Lewis, to the [facility]. Please see a copy of my visit note below.    Ellie Lewis  :  1950  DOS: 2019  MRN: [id number]    Sports Medicine Clinic Visit    PCP: Rima Agarwal    Ellie Lewis is a 69 year old female who is seen in follow-up presenting with chronic left hip pain.    Interim History - 2019  Since last visit on 2019 patient has moderate worsening left hip pain over the past ~ 4 weeks.  Left hip intra-articular injection completed on  provided good relief for ~ 4 months.  Patient desires repeat injection today.  No new injury in the interim.      Review of Systems  Musculoskeletal: as above  Remainder of review of systems is negative including constitutional, CV, pulmonary, GI, Skin and Neurologic except as noted in HPI or medical history.    No past medical history on file.  No past surgical history on file.  Family History   Problem Relation Age of Onset     Heart Murmur Mother      Alzheimer Disease Father        Objective  /76   Ht 1.657 m (5' 5.25\")   Wt 77.1 kg (170 lb)   BMI 28.07 kg/m         General: healthy, alert and in no distress      HEENT: no scleral icterus or conjunctival erythema     Skin: no suspicious lesions or rash. No jaundice.     CV: regular rhythm by palpation, 2+ distal pulses, no pedal edema      Resp: normal respiratory effort without conversational dyspnea     Psych: normal mood and affect      Gait: antalgic, appropriate coordination and balance     Neuro: normal light touch sensory exam of the extremities. Motor strength as noted below       Left hip exam    Inspection:        no edema or ecchymosis in hip area    ROM:       Flexion 110       internal rotation 15      external rotation 30      Range of motion limited by pain    Strength:      "   flexion 4/5       extension 4/5       abduction 3/5       adduction 4/5    Tender:        greater trochanter       Anterior hip joint       Mild ipsilateral SI joint TTP    Non Tender:        remainder of hip area       illiac crest       ASIS       pubis    Sensation:        grossly intact in hip and thigh    Skin:       well perfused       capillary refill brisk    Special Tests:        neg (-) SHIRA       positive (+) FADIR       positive (+) scour       neg (-) Jonathan    Radiology  [facility]   4/8/2019 11:18 AM     HISTORY: Left hip pain.     COMPARISON: Radiographs of the left hip on 3/5/2019.                                                                      IMPRESSION: There are surgical changes of a right total hip  arthroplasty. The distal femoral stem component is not included on  this study. No fracture or osseous lesion is demonstrated. Again seen  are mild-to-moderate degenerative changes of the left hip.  Degenerative changes are also seen in the visualized lower lumbar  spine. No other abnormality is demonstrated. I see no definite change  since the previous examination.     Large Joint Injection/Arthocentesis: L hip joint  Date/Time: 9/7/2019 9:50 AM  Performed by: Fox Bishop DO  Authorized by: Fox Bishop DO     Indications:  Pain and diagnostic evaluation  Needle Size:  22 G  Guidance: ultrasound    Approach:  Anterior  Location:  Hip      Site:  L hip joint  Medications:  40 mg triamcinolone 40 MG/ML; 3 mL ropivacaine 5 MG/ML  Outcome:  Tolerated well, no immediate complications  Procedure discussed: discussed risks, benefits, and alternatives    Consent Given by:  Patient  Timeout: timeout called immediately prior to procedure    Prep: patient was prepped and draped in usual sterile fashion     4 ml's of 1% lidocaine was used as local anesthetic prior to injection      Assessment:  1. Left hip pain    2. Primary osteoarthritis of left hip        Plan:  Discussed  the assessment with the patient.  Follow up: As needed based on clinical progress  Physical therapy options reviewed in detail today  We reviewed low impact activities including pool and bike  XR images independently visualized and reviewed with patient today in clinic  Good relief from prior intra-articular hip injection, we will repeat that today  We reviewed the potential risks and benefits of corticosteroid injection, and desire to limit these overall  Surgical referral available in the future if need to discuss total hip arthroplasty  Expectations and goals of CSI reviewed  Often 2-3 days for steroid effect, and can take up to two weeks for maximum effect  We discussed modified progressive pain-free activity as tolerated  Do not overuse in first two weeks if feeling better due to concern for vulnerability while steroid is working  Supportive care reviewed  All questions were answered today  Contact us with additional questions or concerns  Signs and sx of concern reviewed      Fox Bishop DO, CAQ  Primary Care Sports Medicine  [facility]             Disclaimer: This note consists of symbols derived from keyboarding, dictation and/or voice recognition software. As a result, there may be errors in the script that have gone undetected. Please consider this when interpreting information found in this chart.    Again, thank you for allowing me to participate in the care of your patient.        Sincerely,        Fox Bishop DO

## 2019-09-07 NOTE — PROGRESS NOTES
"Ellie Lewis  :  1950  DOS: 2019  MRN: [id number]    Sports Medicine Clinic Visit    PCP: Rima Agarwal    Ellie Lewis is a 69 year old female who is seen in follow-up presenting with chronic left hip pain.    Interim History - 2019  Since last visit on 2019 patient has moderate worsening left hip pain over the past ~ 4 weeks.  Left hip intra-articular injection completed on  provided good relief for ~ 4 months.  Patient desires repeat injection today.  No new injury in the interim.      Review of Systems  Musculoskeletal: as above  Remainder of review of systems is negative including constitutional, CV, pulmonary, GI, Skin and Neurologic except as noted in HPI or medical history.    No past medical history on file.  No past surgical history on file.  Family History   Problem Relation Age of Onset     Heart Murmur Mother      Alzheimer Disease Father        Objective  /76   Ht 1.657 m (5' 5.25\")   Wt 77.1 kg (170 lb)   BMI 28.07 kg/m        General: healthy, alert and in no distress      HEENT: no scleral icterus or conjunctival erythema     Skin: no suspicious lesions or rash. No jaundice.     CV: regular rhythm by palpation, 2+ distal pulses, no pedal edema      Resp: normal respiratory effort without conversational dyspnea     Psych: normal mood and affect      Gait: antalgic, appropriate coordination and balance     Neuro: normal light touch sensory exam of the extremities. Motor strength as noted below       Left hip exam    Inspection:        no edema or ecchymosis in hip area    ROM:       Flexion 110       internal rotation 15      external rotation 30      Range of motion limited by pain    Strength:        flexion 4/5       extension 4/5       abduction 3/5       adduction 4/5    Tender:        greater trochanter       Anterior hip joint       Mild ipsilateral SI joint TTP    Non Tender:        remainder of hip area       illiac crest       ASIS       " pubis    Sensation:        grossly intact in hip and thigh    Skin:       well perfused       capillary refill brisk    Special Tests:        neg (-) SHIRA       positive (+) FADIR       positive (+) scour       neg (-) Jonathan    Radiology  [facility]   4/8/2019 11:18 AM     HISTORY: Left hip pain.     COMPARISON: Radiographs of the left hip on 3/5/2019.                                                                      IMPRESSION: There are surgical changes of a right total hip  arthroplasty. The distal femoral stem component is not included on  this study. No fracture or osseous lesion is demonstrated. Again seen  are mild-to-moderate degenerative changes of the left hip.  Degenerative changes are also seen in the visualized lower lumbar  spine. No other abnormality is demonstrated. I see no definite change  since the previous examination.     Large Joint Injection/Arthocentesis: L hip joint  Date/Time: 9/7/2019 9:50 AM  Performed by: Fox Bishop DO  Authorized by: Fox Bishop DO     Indications:  Pain and diagnostic evaluation  Needle Size:  22 G  Guidance: ultrasound    Approach:  Anterior  Location:  Hip      Site:  L hip joint  Medications:  40 mg triamcinolone 40 MG/ML; 3 mL ropivacaine 5 MG/ML  Outcome:  Tolerated well, no immediate complications  Procedure discussed: discussed risks, benefits, and alternatives    Consent Given by:  Patient  Timeout: timeout called immediately prior to procedure    Prep: patient was prepped and draped in usual sterile fashion     4 ml's of 1% lidocaine was used as local anesthetic prior to injection      Assessment:  1. Left hip pain    2. Primary osteoarthritis of left hip        Plan:  Discussed the assessment with the patient.  Follow up: As needed based on clinical progress  Physical therapy options reviewed in detail today  We reviewed low impact activities including pool and bike  XR images independently visualized and reviewed with  patient today in clinic  Good relief from prior intra-articular hip injection, we will repeat that today  We reviewed the potential risks and benefits of corticosteroid injection, and desire to limit these overall  Surgical referral available in the future if need to discuss total hip arthroplasty  Expectations and goals of CSI reviewed  Often 2-3 days for steroid effect, and can take up to two weeks for maximum effect  We discussed modified progressive pain-free activity as tolerated  Do not overuse in first two weeks if feeling better due to concern for vulnerability while steroid is working  Supportive care reviewed  All questions were answered today  Contact us with additional questions or concerns  Signs and sx of concern reviewed      Fox Bishop DO, IBRAHIMA  Primary Care Sports Medicine  [facility]             Disclaimer: This note consists of symbols derived from keyboarding, dictation and/or voice recognition software. As a result, there may be errors in the script that have gone undetected. Please consider this when interpreting information found in this chart.

## 2019-09-09 RX ORDER — ROPIVACAINE HYDROCHLORIDE 5 MG/ML
3 INJECTION, SOLUTION EPIDURAL; INFILTRATION; PERINEURAL
Status: SHIPPED | OUTPATIENT
Start: 2019-09-07

## 2019-09-09 RX ORDER — TRIAMCINOLONE ACETONIDE 40 MG/ML
40 INJECTION, SUSPENSION INTRA-ARTICULAR; INTRAMUSCULAR
Status: SHIPPED | OUTPATIENT
Start: 2019-09-07

## 2019-11-12 DIAGNOSIS — E03.9 ACQUIRED HYPOTHYROIDISM: ICD-10-CM

## 2019-11-12 DIAGNOSIS — F33.41 RECURRENT MAJOR DEPRESSIVE DISORDER, IN PARTIAL REMISSION (H): ICD-10-CM

## 2019-11-12 DIAGNOSIS — F33.9 RECURRENT MAJOR DEPRESSION RESISTANT TO TREATMENT (H): ICD-10-CM

## 2019-11-12 RX ORDER — FLUOXETINE 40 MG/1
CAPSULE ORAL
Qty: 30 CAPSULE | Refills: 0 | Status: SHIPPED | OUTPATIENT
Start: 2019-11-12 | End: 2020-02-24

## 2019-11-12 RX ORDER — LEVOTHYROXINE SODIUM 100 UG/1
TABLET ORAL
Qty: 30 TABLET | Refills: 0 | Status: SHIPPED | OUTPATIENT
Start: 2019-11-12 | End: 2020-02-24

## 2019-11-12 RX ORDER — BUPROPION HYDROCHLORIDE 100 MG/1
TABLET, EXTENDED RELEASE ORAL
Qty: 60 TABLET | Refills: 0 | Status: SHIPPED | OUTPATIENT
Start: 2019-11-12 | End: 2020-02-24

## 2019-11-12 NOTE — TELEPHONE ENCOUNTER
LEVOTHYROXINE  0.1MG  TAB      Last Written Prescription Date:  9/6/19  Last Fill Quantity: 90,   # refills: 0  Last Office Visit: 3/5/19  Future Office visit:    Next 5 appointments (look out 90 days)    Dec 05, 2019 10:20 AM CST  Pre-Op physical with MARIANN Jarquin CNP  [facility] [address] [address]  [phone]           buPROPion (WELLBUTRIN SR) 100 MG 12 hr tablet      Last Written Prescription Date:  1/23/19  Last Fill Quantity: 180,   # refills: 2  Last Office Visit: 3/5/19  Future Office visit:    Next 5 appointments (look out 90 days)    Dec 05, 2019 10:20 AM CST  Pre-Op physical with MARIANN Jarquin CNP  [facility] ([facility]) [address] [id number] [phone]           FLUoxetine (PROZAC) 40 MG capsule      Last Written Prescription Date:  1/23/19  Last Fill Quantity: 90,   # refills: 2  Last Office Visit: 3/5/19  Future Office visit:    Next 5 appointments (look out 90 days)    Dec 05, 2019 10:20 AM CST  Pre-Op physical with MARIANN Jarquin CNP  [facility] ([facility]) [address] [id number] [phone]           Requested Prescriptions   Pending Prescriptions Disp Refills     levothyroxine (SYNTHROID/LEVOTHROID) 100 MCG tablet [Pharmacy Med Name: LEVOTHYROXINE  0.1MG  TAB] 90 tablet 0     Sig: TAKE 1 TABLET BY MOUTH  DAILY       Thyroid Protocol Failed - 11/12/2019  4:45 AM        Failed - Normal TSH on file in past 12 months     Recent Labs   Lab Test 11/06/18  0902   TSH 1.79              Passed - Patient is 12 years or older        Passed - Recent (12 mo) or future (30 days) visit within the authorizing provider's specialty     Patient has had an office visit with the authorizing provider or a provider within the authorizing providers department within the previous 12 mos  "or has a future within next 30 days. See \"Patient Info\" tab in inbasket, or \"Choose Columns\" in Meds & Orders section of the refill encounter.              Passed - Medication is active on med list        Passed - No active pregnancy on record     If patient is pregnant or has had a positive pregnancy test, please check TSH.          Passed - No positive pregnancy test in past 12 months     If patient is pregnant or has had a positive pregnancy test, please check TSH.          buPROPion (WELLBUTRIN SR) 100 MG 12 hr tablet [Pharmacy Med Name: BUPROPION  100MG  TAB  SR 12HR] 180 tablet 2     Sig: TAKE 1 TABLET BY MOUTH TWO  TIMES DAILY       SSRIs Protocol Failed - 11/12/2019  4:45 AM        Failed - PHQ-9 score less than 5 in past 6 months     Please review last PHQ-9 score.           Passed - Medication is Bupropion     If the medication is Bupropion (Wellbutrin), and the patient is taking for smoking cessation; OK to refill.          Passed - Medication is active on med list        Passed - Patient is age 18 or older        Passed - No active pregnancy on record        Passed - No positive pregnancy test in last 12 months        Passed - Recent (6 mo) or future (30 days) visit within the authorizing provider's specialty     Patient had office visit in the last 6 months or has a visit in the next 30 days with authorizing provider or within the authorizing provider's specialty.  See \"Patient Info\" tab in inbasket, or \"Choose Columns\" in Meds & Orders section of the refill encounter.            FLUoxetine (PROZAC) 40 MG capsule [Pharmacy Med Name: FLUOXETINE  40MG  CAP] 90 capsule 2     Sig: TAKE 1 CAPSULE BY MOUTH  DAILY       SSRIs Protocol Failed - 11/12/2019  4:45 AM        Failed - PHQ-9 score less than 5 in past 6 months     Please review last PHQ-9 score.           Passed - Medication is Bupropion     If the medication is Bupropion (Wellbutrin), and the patient is taking for smoking cessation; OK to refill.   " "       Passed - Medication is active on med list        Passed - Patient is age 18 or older        Passed - No active pregnancy on record        Passed - No positive pregnancy test in last 12 months        Passed - Recent (6 mo) or future (30 days) visit within the authorizing provider's specialty     Patient had office visit in the last 6 months or has a visit in the next 30 days with authorizing provider or within the authorizing provider's specialty.  See \"Patient Info\" tab in inbasket, or \"Choose Columns\" in Meds & Orders section of the refill encounter.                "

## 2019-12-02 ENCOUNTER — OFFICE VISIT (OUTPATIENT)
Dept: FAMILY MEDICINE | Facility: CLINIC | Age: 69
End: 2019-12-02
Payer: MEDICARE

## 2019-12-02 VITALS
HEIGHT: 65 IN | TEMPERATURE: 97.6 F | DIASTOLIC BLOOD PRESSURE: 73 MMHG | BODY MASS INDEX: 28.79 KG/M2 | WEIGHT: 172.8 LBS | SYSTOLIC BLOOD PRESSURE: 122 MMHG | HEART RATE: 60 BPM

## 2019-12-02 DIAGNOSIS — Z01.818 PREOP GENERAL PHYSICAL EXAM: Primary | ICD-10-CM

## 2019-12-02 DIAGNOSIS — M16.12 ARTHRITIS OF LEFT HIP: ICD-10-CM

## 2019-12-02 DIAGNOSIS — Z23 NEED FOR INFLUENZA VACCINATION: ICD-10-CM

## 2019-12-02 LAB
ANION GAP SERPL CALCULATED.3IONS-SCNC: 4 MMOL/L (ref 3–14)
BASOPHILS # BLD AUTO: 0 10E9/L (ref 0–0.2)
BASOPHILS NFR BLD AUTO: 0.6 %
BUN SERPL-MCNC: 13 MG/DL (ref 7–30)
CALCIUM SERPL-MCNC: 9.3 MG/DL (ref 8.5–10.1)
CHLORIDE SERPL-SCNC: 102 MMOL/L (ref 94–109)
CO2 SERPL-SCNC: 29 MMOL/L (ref 20–32)
CREAT SERPL-MCNC: 0.8 MG/DL (ref 0.52–1.04)
DIFFERENTIAL METHOD BLD: NORMAL
EOSINOPHIL # BLD AUTO: 0.2 10E9/L (ref 0–0.7)
EOSINOPHIL NFR BLD AUTO: 2.3 %
ERYTHROCYTE [DISTWIDTH] IN BLOOD BY AUTOMATED COUNT: 12.4 % (ref 10–15)
GFR SERPL CREATININE-BSD FRML MDRD: 74 ML/MIN/{1.73_M2}
GLUCOSE SERPL-MCNC: 81 MG/DL (ref 70–99)
HCT VFR BLD AUTO: 42.7 % (ref 35–47)
HGB BLD-MCNC: 14 G/DL (ref 11.7–15.7)
LYMPHOCYTES # BLD AUTO: 1.8 10E9/L (ref 0.8–5.3)
LYMPHOCYTES NFR BLD AUTO: 25.1 %
MCH RBC QN AUTO: 30.8 PG (ref 26.5–33)
MCHC RBC AUTO-ENTMCNC: 32.8 G/DL (ref 31.5–36.5)
MCV RBC AUTO: 94 FL (ref 78–100)
MONOCYTES # BLD AUTO: 0.5 10E9/L (ref 0–1.3)
MONOCYTES NFR BLD AUTO: 7.6 %
NEUTROPHILS # BLD AUTO: 4.5 10E9/L (ref 1.6–8.3)
NEUTROPHILS NFR BLD AUTO: 64.4 %
PLATELET # BLD AUTO: 272 10E9/L (ref 150–450)
POTASSIUM SERPL-SCNC: 3.9 MMOL/L (ref 3.4–5.3)
RBC # BLD AUTO: 4.54 10E12/L (ref 3.8–5.2)
SODIUM SERPL-SCNC: 135 MMOL/L (ref 133–144)
WBC # BLD AUTO: 7 10E9/L (ref 4–11)

## 2019-12-02 PROCEDURE — 90662 IIV NO PRSV INCREASED AG IM: CPT | Performed by: FAMILY MEDICINE

## 2019-12-02 PROCEDURE — 93000 ELECTROCARDIOGRAM COMPLETE: CPT | Performed by: FAMILY MEDICINE

## 2019-12-02 PROCEDURE — 85025 COMPLETE CBC W/AUTO DIFF WBC: CPT | Performed by: FAMILY MEDICINE

## 2019-12-02 PROCEDURE — 80048 BASIC METABOLIC PNL TOTAL CA: CPT | Performed by: FAMILY MEDICINE

## 2019-12-02 PROCEDURE — 36415 COLL VENOUS BLD VENIPUNCTURE: CPT | Performed by: FAMILY MEDICINE

## 2019-12-02 PROCEDURE — G0008 ADMIN INFLUENZA VIRUS VAC: HCPCS | Performed by: FAMILY MEDICINE

## 2019-12-02 PROCEDURE — 99214 OFFICE O/P EST MOD 30 MIN: CPT | Mod: 25 | Performed by: FAMILY MEDICINE

## 2019-12-02 ASSESSMENT — MIFFLIN-ST. JEOR: SCORE: 1308.66

## 2019-12-02 NOTE — PATIENT INSTRUCTIONS
Malka Bronw,    Thank you for allowing [facility] to manage your care.    I ordered some blood work, please go to the laboratory to get your laboratory studies.    Please allow 1-2 business days for our office to call you in regards to your laboratory/radiological studies.  If not done so, I encourage you to login into [facility] [email or web address] to review your results as well.     Kati Jorge to you and your family!    If you have any questions or concerns, please feel free to call us at [phone].    Sincerely,    Dr. Smith    Did you know?  You can schedule an e-Visit for certain simple non-emergent issue for your convenience.  To learn more about or start an eVisit, simply login to [facility], click  Visits  on top banner, click  Start a Virtual Visit  drop down, and click  Symptom-Specific E-Visit         Before Your Surgery      Call your surgeon if there is any change in your health. This includes signs of a cold or flu (such as a sore throat, runny nose, cough, rash or fever).    Do not smoke, drink alcohol or take over the counter medicine (unless your surgeon or primary care doctor tells you to) for the 24 hours before and after surgery.    If you take prescribed drugs: Follow your doctor s orders about which medicines to take and which to stop until after surgery.    Eating and drinking prior to surgery: follow the instructions from your surgeon    Take a shower or bath the night before surgery. Use the soap your surgeon gave you to gently clean your skin. If you do not have soap from your surgeon, use your regular soap. Do not shave or scrub the surgery site.  Wear clean pajamas and have clean sheets on your bed.

## 2019-12-02 NOTE — PROGRESS NOTES
[facility]  [address] [facility] [address] [address]  [phone]  Dept: [phone]    PRE-OP EVALUATION:  Today's date: 2019    Ellie Lewis (: 1950) presents for pre-operative evaluation assessment as requested by Dr. Wright ([facility] [place] [facility]).  She requires evaluation and anesthesia risk assessment prior to undergoing surgery/procedure for treatment of Hip Pain .    Proposed Surgery/ Procedure: Total Left hip Arthroplasty  Date of Surgery/ Procedure: 2019  Time of Surgery/ Procedure: [facility]  Hospital/Surgical Facility: [facility]  Fax number for surgical facility: [phone]  Primary Physician: Rima Agarwal  Type of Anesthesia Anticipated: General    Patient has a Health Care Directive or Living Will:  NO    1. NO - Do you have a history of heart attack, stroke, stent, bypass or surgery on an artery in the head, neck, heart or legs?  2. NO - Do you ever have any pain or discomfort in your chest?  3. NO - Do you have a history of  Heart Failure?  4. NO - Are you troubled by shortness of breath when: walking on the level, up a slight hill or at night?  5. NO - Do you currently have a cold, bronchitis or other respiratory infection?  6. NO - Do you have a cough, shortness of breath or wheezing?  7. NO - Do you sometimes get pains in the calves of your legs when you walk?  8. NO - Do you or anyone in your family have previous history of blood clots?  9. NO - Do you or does anyone in your family have a serious bleeding problem such as prolonged bleeding following surgeries or cuts?  10. NO - Have you ever had problems with anemia or been told to take iron pills?  11. NO - Have you had any abnormal blood loss such as black, tarry or bloody stools, or abnormal vaginal bleeding?  12. NO - Have you ever had a blood transfusion?  13. YES, Brother - Have you or any of your relatives ever had problems with anesthesia?  14. NO - Do you have sleep  apnea, excessive snoring or daytime drowsiness?  15. NO - Do you have any prosthetic heart valves?  16. YES, Right hip - Do you have prosthetic joints?  17. NO - Is there any chance that you may be pregnant?      HPI:   -  HPI related to upcoming procedure: 68 yo female with history of left hip arthritis.       See problem list for active medical problems.  Problems all longstanding and stable, except as noted/documented.  See ROS for pertinent symptoms related to these conditions.      MEDICAL HISTORY:     Patient Active Problem List    Diagnosis Date Noted     Advanced care planning/counseling discussion 11/21/2017     Priority: Medium     Advance Care Planning 11/21/2017: ACP Review of Chart / Resources Provided:  Reviewed chart for advance care plan.  Ellie Lewis has been provided information and resources to begin or update their advance care plan.  Added by Falguni Santamaria           Recurrent major depressive disorder, in partial remission (H) 11/21/2017     Priority: Medium     Status post total replacement of right hip 11/28/2016     Priority: Medium     Depression 11/07/2016     Priority: Medium     Hypothyroidism due to acquired atrophy of thyroid 09/01/2016     Priority: Medium     Recurrent major depression resistant to treatment (H) 09/01/2016     Priority: Medium     OCD (obsessive compulsive disorder) 09/01/2016     Priority: Medium      History reviewed. No pertinent past medical history.  History reviewed. No pertinent surgical history.  Current Outpatient Medications   Medication Sig Dispense Refill     buPROPion (WELLBUTRIN SR) 100 MG 12 hr tablet TAKE 1 TABLET BY MOUTH TWO  TIMES DAILY (Needs follow-up appointment for this medication) 60 tablet 0     FLUoxetine (PROZAC) 40 MG capsule TAKE 1 CAPSULE BY MOUTH  DAILY (Needs follow-up appointment for this medication) 30 capsule 0     levothyroxine (SYNTHROID/LEVOTHROID) 100 MCG tablet TAKE 1 TABLET BY MOUTH  DAILY (Needs follow-up appointment for  "this medication) 30 tablet 0     OTC products: None, except as noted above    Allergies   Allergen Reactions     Sulfa Drugs Rash      Latex Allergy: NO    Social History     Tobacco Use     Smoking status: Never Smoker     Smokeless tobacco: Never Used   Substance Use Topics     Alcohol use: No     History   Drug Use No       REVIEW OF SYSTEMS:   CONSTITUTIONAL: NEGATIVE for fever, chills, change in weight  INTEGUMENTARY/SKIN: NEGATIVE for worrisome rashes, moles or lesions  EYES: NEGATIVE for vision changes or irritation  ENT/MOUTH: NEGATIVE for ear, mouth and throat problems  RESP: NEGATIVE for significant cough or SOB  BREAST: NEGATIVE for masses, tenderness or discharge  CV: NEGATIVE for chest pain, palpitations or peripheral edema  GI: NEGATIVE for nausea, abdominal pain, heartburn, or change in bowel habits  : NEGATIVE for frequency, dysuria, or hematuria  MUSCULOSKELETAL: NEGATIVE for significant arthralgias or myalgia  NEURO: NEGATIVE for weakness, dizziness or paresthesias  ENDOCRINE: NEGATIVE for temperature intolerance, skin/hair changes  HEME: NEGATIVE for bleeding problems  PSYCHIATRIC: NEGATIVE for changes in mood or affect    EXAM:   /73   Pulse 60   Temp 97.6  F (36.4  C) (Tympanic)   Ht 1.657 m (5' 5.25\")   Wt 78.4 kg (172 lb 12.8 oz)   BMI 28.54 kg/m      GENERAL APPEARANCE: healthy, alert and no distress     EYES: EOMI, PERRL     HENT: ear canals and TM's normal and nose and mouth without ulcers or lesions     NECK: no adenopathy, no asymmetry, masses, or scars and thyroid normal to palpation     RESP: lungs clear to auscultation - no rales, rhonchi or wheezes     CV: regular rates and rhythm, normal S1 S2, no S3 or S4 and no murmur, click or rub     ABDOMEN:  soft, nontender, no HSM or masses and bowel sounds normal     MS: extremities normal- no gross deformities noted, no evidence of inflammation in joints, FROM in all extremities.     SKIN: no suspicious lesions or rashes     " NEURO: Normal strength and tone, sensory exam grossly normal, mentation intact and speech normal     PSYCH: mentation appears normal. and affect normal/bright     LYMPHATICS: No cervical adenopathy    DIAGNOSTICS:       Recent Labs   Lab Test 05/30/17  1555 11/08/16  0747 10/03/16  0823 04/22/16 09/23/15   HGB  --  14.7 14.5  --   --    PLT  --  209 218  --   --     143  --   --   --    POTASSIUM 4.0 4.9  --   --   --    CR 0.67 0.84  --   --   --    A1C  --   --   --  5.6 5.7*        IMPRESSION:   Reason for surgery/procedure: Left hip osteoarthritis/Left hip arthroplasty  Diagnosis/reason for consult: Pre-op Clearance    The proposed surgical procedure is considered INTERMEDIATE risk.    REVISED CARDIAC RISK INDEX  The patient has the following serious cardiovascular risks for perioperative complications such as (MI, PE, VFib and 3  AV Block):  No serious cardiac risks  INTERPRETATION: 0 risks: Class I (very low risk - 0.4% complication rate)    The patient has the following additional risks for perioperative complications:  No identified additional risks      ICD-10-CM    1. Preop general physical exam Z01.818        RECOMMENDATIONS:     --Consult hospital rounder / IM to assist post-op medical management    --Patient is to take all scheduled medications on the day of surgery EXCEPT for modifications listed below.    APPROVAL GIVEN to proceed with proposed procedure, without further diagnostic evaluation       Signed Electronically by: Jostin Smith DO    Copy of this evaluation report is provided to requesting physician.    [facility] Preop Guidelines    Revised Cardiac Risk Index

## 2019-12-17 ENCOUNTER — TRANSFERRED RECORDS (OUTPATIENT)
Dept: HEALTH INFORMATION MANAGEMENT | Facility: CLINIC | Age: 69
End: 2019-12-17

## 2020-01-09 ENCOUNTER — TELEPHONE (OUTPATIENT)
Dept: FAMILY MEDICINE | Facility: CLINIC | Age: 70
End: 2020-01-09

## 2020-01-09 NOTE — TELEPHONE ENCOUNTER
Panel Management Review      Patient has the following on her problem list:     Depression / Dysthymia review    Measure:  Needs PHQ-9 score of 4 or less during index window.  Administer PHQ-9 and if score is 5 or more, send encounter to provider for next steps.    5 - 7 month window range:     PHQ-9 SCORE 11/21/2017 5/15/2018 11/6/2018   PHQ-9 Total Score 12 7 5       If PHQ-9 recheck is 5 or more, route to provider for next steps.    Patient is due for:  PHQ9, GAD7, medicare exam, mammogram, dexa     Composite cancer screening  Chart review shows that this patient is due/due soon for the following Mammogram  Summary:    Patient is due/failing the following:   MAMMOGRAM, PHQ9 and PHYSICAL    Action needed:   Patient needs office visit for medicare exam.    Type of outreach:    Sent [employer] message.    Questions for provider review:    None                                                                                                                                    Stephani Field CMA on 1/9/2020 at 2:07 PM       Chart routed to none .

## 2020-02-24 ENCOUNTER — OFFICE VISIT (OUTPATIENT)
Dept: FAMILY MEDICINE | Facility: CLINIC | Age: 70
End: 2020-02-24
Payer: MEDICARE

## 2020-02-24 VITALS
OXYGEN SATURATION: 98 % | SYSTOLIC BLOOD PRESSURE: 112 MMHG | WEIGHT: 170.8 LBS | DIASTOLIC BLOOD PRESSURE: 68 MMHG | BODY MASS INDEX: 28.46 KG/M2 | TEMPERATURE: 98 F | HEART RATE: 64 BPM | HEIGHT: 65 IN

## 2020-02-24 DIAGNOSIS — Z13.220 LIPID SCREENING: ICD-10-CM

## 2020-02-24 DIAGNOSIS — Z13.1 SCREENING FOR DIABETES MELLITUS: ICD-10-CM

## 2020-02-24 DIAGNOSIS — Z00.00 ENCOUNTER FOR MEDICARE ANNUAL WELLNESS EXAM: Primary | ICD-10-CM

## 2020-02-24 DIAGNOSIS — E03.9 ACQUIRED HYPOTHYROIDISM: ICD-10-CM

## 2020-02-24 DIAGNOSIS — F33.41 RECURRENT MAJOR DEPRESSIVE DISORDER, IN PARTIAL REMISSION (H): ICD-10-CM

## 2020-02-24 PROCEDURE — G0438 PPPS, INITIAL VISIT: HCPCS | Performed by: FAMILY MEDICINE

## 2020-02-24 PROCEDURE — 99213 OFFICE O/P EST LOW 20 MIN: CPT | Mod: 25 | Performed by: FAMILY MEDICINE

## 2020-02-24 RX ORDER — BUPROPION HYDROCHLORIDE 100 MG/1
TABLET, EXTENDED RELEASE ORAL
Qty: 180 TABLET | Refills: 3 | Status: SHIPPED | OUTPATIENT
Start: 2020-02-24 | End: 2021-02-25

## 2020-02-24 RX ORDER — LEVOTHYROXINE SODIUM 100 UG/1
TABLET ORAL
Qty: 90 TABLET | Refills: 3 | Status: SHIPPED | OUTPATIENT
Start: 2020-02-24 | End: 2021-02-25

## 2020-02-24 RX ORDER — FLUOXETINE 40 MG/1
CAPSULE ORAL
Qty: 90 CAPSULE | Refills: 3 | Status: SHIPPED | OUTPATIENT
Start: 2020-02-24 | End: 2021-02-25

## 2020-02-24 ASSESSMENT — ENCOUNTER SYMPTOMS
FEVER: 0
JOINT SWELLING: 0
HEADACHES: 0
CHILLS: 0
ARTHRALGIAS: 0
SHORTNESS OF BREATH: 0
HEMATURIA: 0
NERVOUS/ANXIOUS: 0
HEMATOCHEZIA: 0
PALPITATIONS: 0
DIZZINESS: 0
WEAKNESS: 0
BREAST MASS: 0
PARESTHESIAS: 0
DIARRHEA: 0
EYE PAIN: 0
FREQUENCY: 0
COUGH: 0
NAUSEA: 0
SORE THROAT: 0
CONSTIPATION: 0
ABDOMINAL PAIN: 0
DYSURIA: 0
MYALGIAS: 0
HEARTBURN: 0

## 2020-02-24 ASSESSMENT — ACTIVITIES OF DAILY LIVING (ADL): CURRENT_FUNCTION: NO ASSISTANCE NEEDED

## 2020-02-24 ASSESSMENT — MIFFLIN-ST. JEOR: SCORE: 1290.62

## 2020-02-24 NOTE — NURSING NOTE
"Initial There were no vitals taken for this visit. Estimated body mass index is 28.54 kg/m  as calculated from the following:    Height as of 12/2/19: 1.657 m (5' 5.25\").    Weight as of 12/2/19: 78.4 kg (172 lb 12.8 oz). .    "

## 2020-02-24 NOTE — PROGRESS NOTES
"SUBJECTIVE:   Ellie Lewis is a 70 year old female who presents for Preventive Visit.      Are you in the first 12 months of your Medicare coverage?  No    Healthy Habits:     In general, how would you rate your overall health?  Good    Frequency of exercise:  None    Duration of exercise:  Other    Do you usually eat at least 4 servings of fruit and vegetables a day, include whole grains    & fiber and avoid regularly eating high fat or \"junk\" foods?  Yes    Taking medications regularly:  No    Barriers to taking medications:  None    Medication side effects:  None    Ability to successfully perform activities of daily living:  No assistance needed    Home Safety:  Lack of grab bars in the bathroom    Hearing Impairment:  No hearing concerns    In the past 6 months, have you been bothered by leaking of urine? Yes    In general, how would you rate your overall mental or emotional health?  Fair      PHQ-2 Total Score: 0    Additional concerns today:  No    Do you feel safe in your environment? Yes    Have you ever done Advance Care Planning? (For example, a Health Directive, POLST, or a discussion with a medical provider or your loved ones about your wishes): Yes, advance care planning is on file.      Fall risk  Fallen 2 or more times in the past year?: Yes  Any fall with injury in the past year?: No  Timed Up and Go Test (>13.5 is fall risk; contact physician) : 7   No recent falls    Cognitive Screening   1) Repeat 3 items (Leader, Season, Table)    2) Clock draw: NORMAL  3) 3 item recall: Recalls 3 objects  Results: 3 items recalled: COGNITIVE IMPAIRMENT LESS LIKELY    Mini-CogTM Copyright GEOVANNA Brennan. Licensed by the author for use in [facility]; reprinted with permission (kiki@.[facility]). All rights reserved.      Do you have sleep apnea, excessive snoring or daytime drowsiness?: no    Reviewed and updated as needed this visit by clinical staff  Tobacco  Allergies  Meds  Med Hx  Surg Hx  Fam Hx "  Soc Hx        Reviewed and updated as needed this visit by Provider        Social History     Tobacco Use     Smoking status: Never Smoker     Smokeless tobacco: Never Used   Substance Use Topics     Alcohol use: No     If you drink alcohol do you typically have >3 drinks per day or >7 drinks per week? No    Alcohol Use 2/17/2020   Prescreen: >3 drinks/day or >7 drinks/week? Not Applicable   Prescreen: >3 drinks/day or >7 drinks/week? -           *vaccines    Current providers sharing in care for this patient include:   Patient Care Team:  Rima Agarwal MD as PCP - General (Family Practice)  Jostin Smith DO as Assigned PCP    The following health maintenance items are reviewed in Epic and correct as of today:  Health Maintenance   Topic Date Due     DEXA  1950     ZOSTER IMMUNIZATION (1 of 2) 02/10/1999     MEDICARE ANNUAL WELLNESS VISIT  11/21/2018     MAMMO SCREENING  12/05/2018     FALL RISK ASSESSMENT  11/06/2019     OZ ASSESSMENT  07/09/2020     PHQ-9  07/09/2020     LIPID  10/03/2021     ADVANCE CARE PLANNING  11/21/2022     COLONOSCOPY  01/01/2023     DTAP/TDAP/TD IMMUNIZATION (2 - Td) 11/21/2027     HEPATITIS C SCREENING  Completed     DEPRESSION ACTION PLAN  Completed     INFLUENZA VACCINE  Completed     PNEUMOCOCCAL IMMUNIZATION 65+ LOW/MEDIUM RISK  Completed     IPV IMMUNIZATION  Aged Out     MENINGITIS IMMUNIZATION  Aged Out     1. MAWV    2. Depression f/u: Doing well on wellbutrin and prozac.  Patient is requesting refill.    3. Hypothyroidism: Patient is currently on synthroid.     Review of Systems   Constitutional: Negative for chills and fever.   HENT: Negative for congestion, ear pain, hearing loss and sore throat.    Eyes: Negative for pain and visual disturbance.   Respiratory: Negative for cough and shortness of breath.    Cardiovascular: Negative for chest pain, palpitations and peripheral edema.   Gastrointestinal: Negative for abdominal pain, constipation, diarrhea,  "heartburn, hematochezia and nausea.   Breasts:  Negative for tenderness, breast mass and discharge.   Genitourinary: Negative for dysuria, frequency, genital sores, hematuria, pelvic pain, urgency, vaginal bleeding and vaginal discharge.   Musculoskeletal: Negative for arthralgias, joint swelling and myalgias.   Skin: Negative for rash.   Neurological: Negative for dizziness, weakness, headaches and paresthesias.   Psychiatric/Behavioral: Negative for mood changes. The patient is not nervous/anxious.        OBJECTIVE:   /68 (BP Location: Left arm, Cuff Size: Adult Regular)   Pulse 64   Temp 98  F (36.7  C) (Tympanic)   Ht 1.651 m (5' 5\")   Wt 77.5 kg (170 lb 12.8 oz)   SpO2 98%   BMI 28.42 kg/m   Estimated body mass index is 28.42 kg/m  as calculated from the following:    Height as of this encounter: 1.651 m (5' 5\").    Weight as of this encounter: 77.5 kg (170 lb 12.8 oz).  Physical Exam  Constitutional:       General: She is not in acute distress.  HENT:      Head: Normocephalic and atraumatic.   Eyes:      Conjunctiva/sclera: Conjunctivae normal.   Neck:      Musculoskeletal: Normal range of motion.      Trachea: No tracheal deviation.   Cardiovascular:      Rate and Rhythm: Normal rate and regular rhythm.      Heart sounds: Normal heart sounds.   Pulmonary:      Effort: Pulmonary effort is normal. No respiratory distress.      Breath sounds: Normal breath sounds. No wheezing or rales.   Skin:     Findings: No rash.   Neurological:      Mental Status: She is alert.         ASSESSMENT / PLAN:   1. Encounter for Medicare annual wellness exam    2. Recurrent major depressive disorder, in partial remission (H)  - buPROPion (WELLBUTRIN SR) 100 MG 12 hr tablet; TAKE 1 TABLET BY MOUTH TWO  TIMES DAILY (Needs follow-up appointment for this medication)  Dispense: 180 tablet; Refill: 3  - FLUoxetine (PROZAC) 40 MG capsule; TAKE 1 CAPSULE BY MOUTH  DAILY (Needs follow-up appointment for this medication)  " "Dispense: 90 capsule; Refill: 3    3. Acquired hypothyroidism  - levothyroxine (SYNTHROID/LEVOTHROID) 100 MCG tablet; TAKE 1 TABLET BY MOUTH  DAILY (Needs follow-up appointment for this medication)  Dispense: 90 tablet; Refill: 3  - TSH with free T4 reflex; Future    4. Lipid screening  - Lipid panel reflex to direct LDL Fasting; Future    5. Screening for diabetes mellitus  - Glucose; Future    COUNSELING:  Reviewed preventive health counseling, as reflected in patient instructions       Regular exercise       Healthy diet/nutrition    Estimated body mass index is 28.42 kg/m  as calculated from the following:    Height as of this encounter: 1.651 m (5' 5\").    Weight as of this encounter: 77.5 kg (170 lb 12.8 oz).    Weight management plan: Discussed healthy diet and exercise guidelines     reports that she has never smoked. She has never used smokeless tobacco.      Appropriate preventive services were discussed with this patient, including applicable screening as appropriate for cardiovascular disease, diabetes, osteopenia/osteoporosis, and glaucoma.  As appropriate for age/gender, discussed screening for colorectal cancer, prostate cancer, breast cancer, and cervical cancer. Checklist reviewing preventive services available has been given to the patient.    Reviewed patients plan of care and provided an AVS. The Basic Care Plan (routine screening as documented in Health Maintenance) for Ellie meets the Care Plan requirement. This Care Plan has been established and reviewed with the Patient.    Counseling Resources:  ATP IV Guidelines  Pooled Cohorts Equation Calculator  Breast Cancer Risk Calculator  FRAX Risk Assessment  ICSI Preventive Guidelines  Dietary Guidelines for Americans, 2010  USDA's MyPlate  ASA Prophylaxis  Lung CA Screening    Jostin Smith DO  [facility]    Identified Health Risks:    She is at risk for lack of exercise and has been provided with information to increase " physical activity for the benefit of her well-being.  Information on urinary incontinence and treatment options given to patient.  The patient was provided with suggestions to help her develop a healthy emotional lifestyle.  She is at risk for falling and has been provided with information to reduce the risk of falling at home.

## 2020-02-24 NOTE — PATIENT INSTRUCTIONS
Malka Singh,    Thank you for allowing [facility] to manage your care.    I ordered some fasting blood work, please call [phone] to schedule your laboratory appointment.     Please allow 1-2 business days for our office to call you in regards to your laboratory/radiological studies.  If not done so, I encourage you to login into [email or web address] to review your results as well.     If you have any questions or concerns, please feel free to call us at [phone].    Sincerely,    Dr. Smith    Did you know?  You can schedule an e-Visit for certain simple non-emergent issue for your convenience.  To learn more about or start an eVisit, simply login to [email or web address], click  Visits  on top banner, click  Start a Virtual Visit  drop down, and click  Symptom-Specific E-Visit       Patient Education   Personalized Prevention Plan  You are due for the preventive services outlined below.  Your care team is available to assist you in scheduling these services.  If you have already completed any of these items, please share that information with your care team to update in your medical record.  Health Maintenance Due   Topic Date Due     Osteoporosis Screening  1950     Zoster (Shingles) Vaccine (1 of 2) 02/10/1999     Annual Wellness Visit  11/21/2018     FALL RISK ASSESSMENT  11/06/2019       Exercise for a Healthier Heart     Exercise with a friend. When activity is fun, you're more likely to stick with it.   You may wonder how you can improve the health of your heart. If you re thinking about exercise, you re on the right track. You don t need to become an athlete, but you do need a certain amount of brisk exercise to help strengthen your heart. If you have been diagnosed with a heart condition, your doctor may recommend exercise to help stabilize your condition. To help make exercise a habit, choose safe, fun activities.  Be sure to check with your healthcare provider  before starting an exercise program.  Why exercise?  Exercising regularly offers many healthy rewards. It can help you do all of the following:    Improve your blood cholesterol level to help prevent further heart trouble    Lower your blood pressure to help prevent a stroke or heart attack    Control diabetes, or reduce your risk of getting this disease    Improve your heart and lung function    Reach and maintain a healthy weight    Make your muscles stronger and more limber so you can stay active    Prevent falls and fractures by slowing the loss of bone mass (osteoporosis)    Manage stress better    Reduce your blood pressure    Improve your sense of self and your body image  Exercise tips  Ease into your routine. Set small goals. Then build on them.  Exercise on most days. Aim for a total of 150 or more minutes of moderate to  vigorous intensity activity each week. Consider 40 minutes, 3 to 4 times a week. For best results, activity should last for 40 minutes on average. It is OK to work up to the 40 minute period over time. Examples of moderate-intensity activity is walking 1 mile in 15 minutes or 30 to 45 minutes of yard work.  Step up your daily activity level. Along with your exercise program, try being more active throughout the day. Walk instead of drive. Do more household tasks or yard work.  Choose one or more activities you enjoy. Walking is one of the easiest things you can do. You can also try swimming, riding a bike, dancing, or taking an exercise class.  Stop exercising and call your doctor if you:    Have chest pain or feel dizzy or lightheaded    Feel burning, tightness, pressure, or heaviness in your chest, neck, shoulders, back, or arms    Have unusual shortness of breath    Have increased joint or muscle pain    Have palpitations or an irregular heartbeat  Date Last Reviewed: 5/1/2016 2000-2019 [employer]. [address] [facility], [place] [address]. All rights reserved. This  information is not intended as a substitute for professional medical care. Always follow your healthcare professional's instructions.          Urinary Incontinence, Female (Adult)  Urinary incontinence means loss of control of the bladder. This problem affects many women, especially as they get older. If you have incontinence, you may be embarrassed to ask for help. But know that this problem can be treated.  Types of Incontinence  There are different types of incontinence. Two of the main types are described here. You can have more than one type.    Stress incontinence. With this type, urine leaks when pressure (stress) is put on the bladder. This may happen when you cough, sneeze, or laugh. Stress incontinence most often occurs because the pelvic floor muscles that support the bladder and urethra are weak. This can happen after pregnancy and vaginal childbirth or a hysterectomy. It can also be due to excess body weight or hormone changes.    Urge incontinence (also called overactive bladder). With this type, a sudden urge to urinate is felt often. This may happen even though there may not be much urine in the bladder. The need to urinate often during the night is common. Urge incontinence most often occurs because of bladder spasms. This may be due to bladder irritation or infection. Damage to bladder nerves or pelvic muscles, constipation, and certain medicines can also lead to urge incontinence.  Treatment of urinary incontinence depends on the cause. Further evaluation is needed to find the type you have. This will likely include an exam and certain tests. Based on the results, you and your healthcare provider can then plan treatment. Until a diagnosis is made, the home care tips below can help relieve symptoms.  Home care    Do pelvic floor muscle exercises, if they are prescribed. The pelvic floor muscles help support the bladder and urethra. Many women find that their symptoms improve when doing special  exercises that strengthen these muscles. To do the exercises contract the muscles you would use to stop your stream of urine, but do this when you re not urinating. Hold for 10 seconds, then relax. Repeat 10 to 20 times in a row, at least 3 times a day. Your provider may give you other instructions for how to do the exercises and how often.    Keep a bladder diary. This helps track how often and how much you urinate over a set period of time. Bring this diary with you to your next visit with the provider. The information can help your provider learn more about your bladder problem.    Lose weight, if advised to by your provider. Excess weight puts pressure on the bladder. Your provider can help you create a weight-loss plan that s right for you. This may include exercising more and making certain diet changes.    Don't consume foods and drinks that may irritate the bladder. These can include alcohol and caffeinated drinks.    Quit smoking. Smoking and other tobacco use can lead to chronic cough that strains the pelvic floor muscles. Smoking may also damage the bladder and urethra. Talk with your provider about treatments or methods you can use to quit smoking.    If drinking large amounts of fluid causes you to have symptoms, you may be advised to limit your fluid intake. You may also be advised to drink most of your fluids during the day and to limit fluids at night.    If you re worried about urine leakage or accidents, you may wear absorbent pads to catch urine. Change the pads often. This helps reduce discomfort. It may also reduce the risk of skin or bladder infections.  Follow-up care  Follow up with your healthcare provider, or as directed. It may take some to find the right treatment for your problem. Your treatment plan may include special therapies or medicines. Certain procedures or surgery may also be options. Be sure to discuss any questions you have with your provider.  When to seek medical  advice  Call the healthcare provider right away if any of these occur:    Fever of 100.4 F (38 C) or higher, or as directed by your provider    Bladder pain or fullness    Abdominal swelling    Nausea or vomiting    Back pain    Weakness, dizziness or fainting  Date Last Reviewed: 10/1/2017    1039-7648 [employer]. [address] [place] [address]. All rights reserved. This information is not intended as a substitute for professional medical care. Always follow your healthcare professional's instructions.        Your Health Risk Assessment indicates you feel you are not in good emotional health.    Recreation   Recreation is not limited to sports and team events. It includes any activity that provides relaxation, interest, enjoyment, and exercise. Recreation provides an outlet for physical, mental, and social energy. It can give a sense of worth and achievement. It can help you stay healthy.    Mental Exercise and Social Involvement  Mental and emotional health is as important as physical health. Keep in touch with friends and family. Stay as active as possible. Continue to learn and challenge yourself.   Things you can do to stay mentally active are:    Learn something new, like a foreign language or musical instrument.     Play SCRABBLE or do crossword puzzles. If you cannot find people to play these games with you at home, you can play them with others on your computer through the Internet.     Join a games club--anything from card games to chess or checkers or lawn bowling.     Start a new hobby.     Go back to school.     Volunteer.     Read.   Keep up with world events.    Preventing Falls in the Home  An adult or child can fall for many reasons. If you are an older adult, you may fall because your reaction time slows down. Your muscles and joints may get stiff, weak, or less flexible because of illness, medicines, or a physical condition. These things can also make a child more likely  to fall or be injured in a fall.  Other health problems that make falls more likely include:    Arthritis    Dizziness or lightheadedness when you get out of bed (orthostatic hypotension)    History of a stroke    Dizziness    Anemia    Certain medicines taken for mental illness or to control blood pressure.    Problems with balance or gait    Bladder or urinary problems    History of falls with or without an injury    Changes in vision (vision impairment)    Changes in thinking skills and memory (cognitive impairment)  Injuries from a fall can include broken bones, dislocated joints, internal bleeding and cuts. When these injuries are serious enough, they can make it impossible for you or a child who is injured in a fall to live on his or her ownhome.  Prevention tips  To help prevent falls and fall-related injuries, follow the tips below.   Floors  Make floors safer by doing the following:     Put nonskid pads under area rugs.    Remove throw rugs.    Replace worn floor coverings.    Tack carpets firmly to each step on carpeted stairs. Put nonskid strips on the edges of uncarpeted stairs.    Keep floors and stairs free of clutter and cords.    Arrange furniture so there are clear pathways.    Clean up any spills right away.    Wear shoes that fit.  Bathrooms    Make bathrooms safer by doing the following:     Install grab bars in the tub or shower.    Apply nonskid strips or put a nonskid rubber mat in the tub or shower.    Sit on a bath chair to bathe.    Use bathmats with nonskid backing.  Lighting and the environment  Improve lighting in your home by doing the following:     Keep a flashlight in each room. Or put a lamp next to the bed within easy reach.    Put nightlights in the bedrooms, hallways, kitchen, and bathrooms.    Make sure all stairways have good lighting.    Take your time when going up and down stairs.    Put handrails on both sides of stairs and in walkways for more support. To prevent injury to  your wrist or arm, don t use handrails to pull yourself up.    Install grab bars to pull yourself up.    Move or rearrange items that you use often. This will make them easier to find or reach.    Look at your home to find any safety hazards. Especially look at doorways, walkways, and the driveway. Remove or repair any safety problems that you find.  Date Last Reviewed: 8/1/2016 2000-2019 [employer]. [address] [place] [address]. All rights reserved. This information is not intended as a substitute for professional medical care. Always follow your healthcare professional's instructions.

## 2020-02-28 DIAGNOSIS — Z13.220 LIPID SCREENING: ICD-10-CM

## 2020-02-28 DIAGNOSIS — Z13.1 SCREENING FOR DIABETES MELLITUS: ICD-10-CM

## 2020-02-28 DIAGNOSIS — E03.9 ACQUIRED HYPOTHYROIDISM: ICD-10-CM

## 2020-02-28 LAB — TSH SERPL DL<=0.005 MIU/L-ACNC: 3.15 MU/L (ref 0.4–4)

## 2020-02-28 PROCEDURE — 84443 ASSAY THYROID STIM HORMONE: CPT | Performed by: FAMILY MEDICINE

## 2020-02-28 PROCEDURE — 36415 COLL VENOUS BLD VENIPUNCTURE: CPT | Performed by: FAMILY MEDICINE

## 2020-02-28 PROCEDURE — 82947 ASSAY GLUCOSE BLOOD QUANT: CPT | Performed by: FAMILY MEDICINE

## 2020-03-05 DIAGNOSIS — Z13.220 LIPID SCREENING: ICD-10-CM

## 2020-03-05 DIAGNOSIS — Z13.1 SCREENING FOR DIABETES MELLITUS: ICD-10-CM

## 2020-03-05 LAB
CHOLEST SERPL-MCNC: 282 MG/DL
GLUCOSE SERPL-MCNC: 75 MG/DL (ref 70–99)
HDLC SERPL-MCNC: 82 MG/DL
LDLC SERPL CALC-MCNC: 177 MG/DL
NONHDLC SERPL-MCNC: 200 MG/DL
TRIGL SERPL-MCNC: 115 MG/DL

## 2020-03-05 PROCEDURE — 82947 ASSAY GLUCOSE BLOOD QUANT: CPT | Performed by: FAMILY MEDICINE

## 2020-03-05 PROCEDURE — 36415 COLL VENOUS BLD VENIPUNCTURE: CPT | Performed by: FAMILY MEDICINE

## 2020-03-05 PROCEDURE — 80061 LIPID PANEL: CPT | Performed by: FAMILY MEDICINE

## 2020-03-06 DIAGNOSIS — E78.5 HYPERLIPIDEMIA LDL GOAL <160: Primary | ICD-10-CM

## 2020-03-06 RX ORDER — SIMVASTATIN 40 MG
40 TABLET ORAL AT BEDTIME
Qty: 90 TABLET | Refills: 3 | Status: SHIPPED | OUTPATIENT
Start: 2020-03-06 | End: 2021-02-25

## 2020-07-22 ENCOUNTER — NURSE TRIAGE (OUTPATIENT)
Dept: NURSING | Facility: CLINIC | Age: 70
End: 2020-07-22

## 2020-07-22 ENCOUNTER — VIRTUAL VISIT (OUTPATIENT)
Dept: FAMILY MEDICINE | Facility: OTHER | Age: 70
End: 2020-07-22

## 2020-07-22 NOTE — TELEPHONE ENCOUNTER
Francisco is calling and is wanting to get tested for covid.  Francisco has a cough and nausea and congestion in head and headache.  Denies shortness of breath.  Denies sore throat.      COVID 19 Nurse Triage Plan/Patient Instructions    Please be aware that novel coronavirus (COVID-19) may be circulating in the community. If you develop symptoms such as fever, cough, or SOB or if you have concerns about the presence of another infection including coronavirus (COVID-19), please contact your health care provider or visit [email or web address].     Disposition/Instructions    Virtual Visit with provider recommended. Reference Visit Selection Guide.    Thank you for taking steps to prevent the spread of this virus.  o Limit your contact with others.  o Wear a simple mask to cover your cough.  o Wash your hands well and often.    Resources    [facility]: About COVID-19: [email or web address]    CDC: What to Do If You're Sick: www.cdc.gov/coronavirus/2019-ncov/about/steps-when-sick.html    CDC: Ending Home Isolation: www.cdc.gov/coronavirus/2019-ncov/hcp/disposition-in-home-patients.html     CDC: Caring for Someone: www.cdc.gov/coronavirus/2019-ncov/if-you-are-sick/care-for-someone.html     [facility]: Interim Guidance for Hospital Discharge to Home: www.health.[facility]/diseases/coronavirus/hcp/hospdischarge.pdf    [facility] clinical trials (COVID-19 research studies): [email or web address]     Below are the COVID-19 hotlines at the [facility] ([facility]). Interpreters are available.   o For health questions: Call 679-969-2581 or 1-486.211.3140 (7 a.m. to 7 p.m.)  o For questions about schools and childcare: Call 480-510-9675 or 1-881.765.7177 (7 a.m. to 7 p.m.)                       Reason for Disposition    [1] COVID-19 infection suspected by caller or triager AND [2] mild symptoms (cough, fever, or others) AND [3] no complications or SOB    Additional Information     Negative: SEVERE difficulty breathing (e.g., struggling for each breath, speaks in single words)    Negative: Difficult to awaken or acting confused (e.g., disoriented, slurred speech)    Negative: Bluish (or gray) lips or face now    Negative: Shock suspected (e.g., cold/pale/clammy skin, too weak to stand, low BP, rapid pulse)    Negative: Sounds like a life-threatening emergency to the triager    Negative: [1] COVID-19 exposure AND [2] no symptoms    Negative: COVID-19 and Breastfeeding, questions about    Negative: [1] Adult with possible COVID-19 symptoms AND [2] triager concerned about severity of symptoms or other causes    Negative: SEVERE or constant chest pain or pressure (Exception: mild central chest pain, present only when coughing)    Negative: MODERATE difficulty breathing (e.g., speaks in phrases, SOB even at rest, pulse 100-120)    Negative: Patient sounds very sick or weak to the triager    Negative: MILD difficulty breathing (e.g., minimal/no SOB at rest, SOB with walking, pulse <100)    Negative: Chest pain or pressure    Negative: Fever > 103 F (39.4 C)    Negative: [1] Fever > 101 F (38.3 C) AND [2] age > 60    Negative: [1] Fever > 100.0 F (37.8 C) AND [2] bedridden (e.g., nursing home patient, CVA, chronic illness, recovering from surgery)    Negative: HIGH RISK patient (e.g., age > 64 years, diabetes, heart or lung disease, weak immune system)    Negative: Fever present > 3 days (72 hours)    Negative: [1] Fever returns after gone for over 24 hours AND [2] symptoms worse or not improved    Negative: [1] Continuous (nonstop) coughing interferes with work or school AND [2] no improvement using cough treatment per protocol    Protocols used: CORONAVIRUS (COVID-19) DIAGNOSED OR [id number] 5.16.20

## 2020-07-22 NOTE — PROGRESS NOTES
"Date: 2020 11:56:08  Clinician: Naun Gruber  Clinician NPI: [id number]  Patient: Ellie Lewis  Patient : 1950  Patient Address[facility] [place] [id number]  Patient Phone: [phone]  Visit Protocol: URI  Patient Summary:  Ellie CUEVAS is a 70 year old ( : 1950 ) female who initiated a Visit for COVID-19 (Coronavirus) evaluation and screening. When asked the question \"Please sign me up to receive news, health information and promotions. \", Ellie CUEVAS responded \"No\".    Ellie CUEVAS states her symptoms started 1-2 days ago.   Her symptoms consist of nausea, a cough, nasal congestion, and a headache.   Symptom details     Nasal secretions: The color of her mucus is clear.    Cough: Ellie CUEVAS coughs every 5-10 minutes and her cough is not more bothersome at night. Phlegm does not come into her throat when she coughs. She does not believe her cough is caused by post-nasal drip.     Headache: She states the headache is mild (1-3 on a 10 point pain scale).      Ellie CUEVAS denies having wheezing, teeth pain, ageusia, diarrhea, myalgias, sore throat, anosmia, facial pain or pressure, fever, vomiting, rhinitis, malaise, ear pain, and chills. She also denies having recent facial or sinus surgery in the past 60 days and taking antibiotic medication in the past month. She is not experiencing dyspnea.   Precipitating events  She has not recently been exposed to someone with influenza. Ellie CUEVAS has been in close contact with the following high risk individuals: adults 65 or older.   Pertinent COVID-19 (Coronavirus) information  In the past 14 days, Ellie CUEVAS has not worked in a congregate living setting.   She does not work or volunteer as healthcare worker or a  and does not work or volunteer in a healthcare facility.   Ellie CUEVAS also has not lived in a congregate living setting in the past 14 days. She does not live with a healthcare worker.   Ellie CUEVAS has not had a close " contact with a laboratory-confirmed COVID-19 patient within 14 days of symptom onset.   Pertinent medical history  Ellie CUEVAS does not get yeast infections when she takes antibiotics.   Ellie CUEVAS does not need a return to work/school note.   Weight: 170 lbs   Ellie CUEVAS does not smoke or use smokeless tobacco.   Weight: 170 lbs    MEDICATIONS: levothyroxine oral, Prozac oral, bupropion HCl oral, ALLERGIES: Sulfa (Sulfonamide Antibiotics)  Clinician Response:  Dear Ellie CUEVAS,   Your symptoms show that you may have coronavirus (COVID-19). This illness can cause fever, cough and trouble breathing. Many people get a mild case and get better on their own. Some people can get very sick.  What should I do?  We would like to test you for this virus.   1. Please call [phone] to schedule your visit. Explain that you were referred by [facility] to have a COVID-19 test. Be ready to share your [facility] visit ID number.  The following will serve as your written order for this COVID Test, ordered by me, for the indication of suspected COVID [Z20.828]: The test will be ordered in [employer], our electronic health record, after you are scheduled. It will show as ordered and authorized by Shaun Bills MD.  Order: COVID-19 (Coronavirus) PCR for SYMPTOMATIC testing from [facility].      2. When it's time for your COVID test:  Stay at least 6 feet away from others. (If someone will drive you to your test, stay in the backseat, as far away from the  as you can.)   Cover your mouth and nose with a mask, tissue or washcloth.  Go straight to the testing site. Don't make any stops on the way there or back.      3.Starting now: Stay home and away from others (self-isolate) until:   You've had no fever---and no medicine that reduces fever---for 3 full days (72 hours). And...   Your other symptoms have gotten better. For example, your cough or breathing has improved. And...   At least 10 days have passed since your symptoms started.       During this  "time, don't leave the house except for testing or medical care.   Stay in your own room, even for meals. Use your own bathroom if you can.   Stay away from others in your home. No hugging, kissing or shaking hands. No visitors.  Don't go to work, school or anywhere else.    Clean \"high touch\" surfaces often (doorknobs, counters, handles, etc.). Use a household cleaning spray or wipes. You'll find a full list of  on the EPA website: www.epa.gov/pesticide-registration/list-n-disinfectants-use-against-sars-cov-2.   Cover your mouth and nose with a mask, tissue or washcloth to avoid spreading germs.  Wash your hands and face often. Use soap and water.  Caregivers in these groups are at risk for severe illness due to COVID-19:  o People 65 years and older  o People who live in a nursing home or long-term care facility  o People with chronic disease (lung, heart, cancer, diabetes, kidney, liver, immunologic)  o People who have a weakened immune system, including those who:   Are in cancer treatment  Take medicine that weakens the immune system, such as corticosteroids  Had a bone marrow or organ transplant  Have an immune deficiency  Have poorly controlled HIV or AIDS  Are obese (body mass index of 40 or higher)  Smoke regularly   o Caregivers should wear gloves while washing dishes, handling laundry and cleaning bedrooms and bathrooms.  o Use caution when washing and drying laundry: Don't shake dirty laundry, and use the warmest water setting that you can.  o For more tips, go to www.cdc.gov/coronavirus/2019-ncov/downloads/10Things.pdf.    4.Sign up for [employer]. We know it's scary to hear that you might have COVID-19. We want to track your symptoms to make sure you're okay over the next 2 weeks. Please look for an email from [employer]---this is a free, online program that we'll use to keep in touch. To sign up, follow the link in the email. Learn more at [email or web address]  How can I take " care of myself?   Get lots of rest. Drink extra fluids (unless a doctor has told you not to).   Take Tylenol (acetaminophen) for fever or pain. If you have liver or kidney problems, ask your family doctor if it's okay to take Tylenol.   Adults can take either:    650 mg (two 325 mg pills) every 4 to 6 hours, or...   1,000 mg (two 500 mg pills) every 8 hours as needed.    Note: Don't take more than 3,000 mg in one day. Acetaminophen is found in many medicines (both prescribed and over-the-counter medicines). Read all labels to be sure you don't take too much.   For children, check the Tylenol bottle for the right dose. The dose is based on the child's age or weight.    If you have other health problems (like cancer, heart failure, an organ transplant or severe kidney disease): Call your specialty clinic if you don't feel better in the next 2 days.       Know when to call 911. Emergency warning signs include:    Trouble breathing or shortness of breath Pain or pressure in the chest that doesn't go away Feeling confused like you haven't felt before, or not being able to wake up Bluish-colored lips or face.  Where can I get more information?   [facility] -- About COVID-19: [email or web address]   CDC -- What to Do If You're Sick: www.cdc.gov/coronavirus/2019-ncov/about/steps-when-sick.html   CDC -- Ending Home Isolation: www.cdc.gov/coronavirus/2019-ncov/hcp/disposition-in-home-patients.html   CDC -- Caring for Someone: www.cdc.gov/coronavirus/2019-ncov/if-you-are-sick/care-for-someone.html   [facility] -- Interim Guidance for Hospital Discharge to Home: www.health.[facility].[place].us/diseases/coronavirus/hcp/hospdischarge.pdf   [facility] clinical trials (COVID-19 research studies): clinicalaffairs.[facility]/umn-clinical-trials    Below are the COVID-19 hotlines at the [place] Department of Health ([facility]). Interpreters are available.    For health questions: Call 994-091-3660 or 1-866.160.5968 (7 a.m. to  7 p.m.) For questions about schools and childcare: Call 389-414-8692 or 1-441.205.5444 (7 a.m. to 7 p.m.)    Diagnosis: Nasal congestion  Diagnosis ICD: R09.81

## 2020-07-23 DIAGNOSIS — Z20.822 SUSPECTED COVID-19 VIRUS INFECTION: Primary | ICD-10-CM

## 2020-07-23 PROCEDURE — U0003 INFECTIOUS AGENT DETECTION BY NUCLEIC ACID (DNA OR RNA); SEVERE ACUTE RESPIRATORY SYNDROME CORONAVIRUS 2 (SARS-COV-2) (CORONAVIRUS DISEASE [COVID-19]), AMPLIFIED PROBE TECHNIQUE, MAKING USE OF HIGH THROUGHPUT TECHNOLOGIES AS DESCRIBED BY CMS-2020-01-R: HCPCS | Performed by: FAMILY MEDICINE

## 2020-07-24 LAB
SARS-COV-2 RNA SPEC QL NAA+PROBE: NOT DETECTED
SPECIMEN SOURCE: NORMAL

## 2020-12-27 ENCOUNTER — HEALTH MAINTENANCE LETTER (OUTPATIENT)
Age: 70
End: 2020-12-27

## 2021-01-14 ENCOUNTER — E-VISIT (OUTPATIENT)
Dept: URGENT CARE | Facility: CLINIC | Age: 71
End: 2021-01-14
Payer: COMMERCIAL

## 2021-01-14 DIAGNOSIS — Z20.822 SUSPECTED COVID-19 VIRUS INFECTION: Primary | ICD-10-CM

## 2021-01-14 PROCEDURE — 99421 OL DIG E/M SVC 5-10 MIN: CPT | Performed by: PHYSICIAN ASSISTANT

## 2021-01-15 DIAGNOSIS — Z20.822 SUSPECTED COVID-19 VIRUS INFECTION: ICD-10-CM

## 2021-01-15 PROCEDURE — U0003 INFECTIOUS AGENT DETECTION BY NUCLEIC ACID (DNA OR RNA); SEVERE ACUTE RESPIRATORY SYNDROME CORONAVIRUS 2 (SARS-COV-2) (CORONAVIRUS DISEASE [COVID-19]), AMPLIFIED PROBE TECHNIQUE, MAKING USE OF HIGH THROUGHPUT TECHNOLOGIES AS DESCRIBED BY CMS-2020-01-R: HCPCS | Performed by: PHYSICIAN ASSISTANT

## 2021-01-15 PROCEDURE — U0005 INFEC AGEN DETEC AMPLI PROBE: HCPCS | Performed by: PHYSICIAN ASSISTANT

## 2021-01-15 NOTE — PATIENT INSTRUCTIONS
Dear Ellie Lewis,    Your symptoms show that you may have coronavirus (COVID-19). This illness can cause fever, cough and trouble breathing. Many people get a mild case and get better on their own. Some people can get very sick.    Will I be tested for COVID-19?  We would like to test you for Covid-19 virus. I have placed orders for this test.     To schedule: go to your [employer] home page and scroll down to the section that says  You have an appointment that needs to be scheduled  and click the large green button that says  Schedule Now  and follow the steps to find the next available openings.    If you are unable to complete these [employer] scheduling steps, please call [phone] to schedule your testing.     Return to work/school/ guidance:  Please let your workplace manager and staffing office know when your quarantine ends     We can t give you an exact date as it depends on the above. You can calculate this on your own or work with your manager/staffing office to calculate this. (For example if you were exposed on 10/4, you would have to quarantine for 14 full days. That would be through 10/18. You could return on 10/19.)      If you receive a positive COVID-19 test result, follow the guidance of the those who are giving you the results. Usually the return to work is 10 (or in some cases 20 days from symptom onset.) If you work at [employer], you must also be cleared by Employee Occupational Health and Safety to return to work.        If you receive a negative COVID-19 test result and did not have a high risk exposure to someone with a known positive COVID-19 test, you can return to work once you're free of fever for 24 hours without fever-reducing medication and your symptoms are improving or resolved.      If you receive a negative COVID-19 test and If you had a high risk exposure to someone who has tested positive for COVID-19 then you can return to work 14 days after your last contact  with the positive individual    Note: If you have ongoing exposure to the covid positive person, this quarantine period may be more than 14 days. (For example, if you are continued to be exposed to your child who tested positive and cannot isolate from them, then the quarantine of 7-14 days can't start until your child is no longer contagious. This is typically 10 days from onset of the child's symptoms. So the total duration may be 17-24 days in this case.)    Sign up for [employer].   We know it's scary to hear that you might have COVID-19. We want to track your symptoms to make sure you're okay over the next 2 weeks. Please look for an email from [employer]--this is a free, online program that we'll use to keep in touch. To sign up, follow the link in the email you will receive. Learn more at [email or web address]    How can I take care of myself?    Get lots of rest. Drink extra fluids (unless a doctor has told you not to)    Take Tylenol (acetaminophen) or ibuprofen for fever or pain. If you have liver or kidney problems, ask your family doctor if it's okay to take Tylenol o ibuprofen    If you have other health problems (like cancer, heart failure, an organ transplant or severe kidney disease): Call your specialty clinic if you don't feel better in the next 2 days.    Know when to call 911. Emergency warning signs include:  o Trouble breathing or shortness of breath  o Pain or pressure in the chest that doesn't go away  o Feeling confused like you haven't felt before, or not being able to wake up  o Bluish-colored lips or face    Where can I get more information?  [facility] - About COVID-19:   [email or web address]    CDC - What to Do If You're Sick:   www.cdc.gov/coronavirus/2019-ncov/about/steps-when-sick.html    Dear Ellie Lewis,    Your symptoms show that you may have coronavirus (COVID-19). This illness can cause fever, cough and trouble breathing. Many people get a  mild case and get better on their own. Some people can get very sick.    Will I be tested for COVID-19?  We would like to test you for Covid-19 virus. I have placed orders for this test.     For all employees or close contacts (except [facility] and [employer] - see below), go to your [employer] home page and scroll down to the section that says  You have an appointment that needs to be scheduled  and click the large green button that says  Schedule Now  and follow the steps to find the next available opening.     If you are unable to complete these steps or if you cannot find any available times, please call [phone] to schedule employee testing.     [facility] employees or close contacts, please call [phone].   [facility]) employees or close contacts call [phone].    Return to work/school/ guidance:  Please let your workplace manager and staffing office know when your quarantine ends     We can t give you an exact date as it depends on the above. You can calculate this on your own or work with your manager/staffing office to calculate this. (For example if you were exposed on 10/4, you would have to quarantine for 14 full days. That would be through 10/18. You could return on 10/19.)      If you receive a positive COVID-19 test result, follow the guidance of the those who are giving you the results. Usually the return to work is 10 (or in some cases 20 days from symptom onset.) If you work at [employer], you must also be cleared by Employee Occupational Health and Safety to return to work.        If you receive a negative COVID-19 test result and did not have a high risk exposure to someone with a known positive COVID-19 test, you can return to work once you're free of fever for 24 hours without fever-reducing medication and your symptoms are improving or resolved.      If you receive a negative COVID-19 test and If you had a high risk exposure to someone who has tested positive for  COVID-19 then you can return to work 14 days after your last contact with the positive individual    Note: If you have ongoing exposure to the covid positive person, this quarantine period may be more than 14 days. (For example, if you are continued to be exposed to your child who tested positive and cannot isolate from them, then the quarantine of 7-14 days can't start until your child is no longer contagious. This is typically 10 days from onset of the child's symptoms. So the total duration may be 17-24 days in this case.)    Sign up for [employer].   We know it's scary to hear that you might have COVID-19. We want to track your symptoms to make sure you're okay over the next 2 weeks. Please look for an email from [employer]--this is a free, online program that we'll use to keep in touch. To sign up, follow the link in the email you will receive. Learn more at [email or web address]    How can I take care of myself?    Get lots of rest. Drink extra fluids (unless a doctor has told you not to)    Take Tylenol (acetaminophen) or ibuprofen for fever or pain. If you have liver or kidney problems, ask your family doctor if it's okay to take Tylenol o ibuprofen    If you have other health problems (like cancer, heart failure, an organ transplant or severe kidney disease): Call your specialty clinic if you don't feel better in the next 2 days.    Know when to call 911. Emergency warning signs include:  o Trouble breathing or shortness of breath  o Pain or pressure in the chest that doesn't go away  o Feeling confused like you haven't felt before, or not being able to wake up  o Bluish-colored lips or face    Where can I get more information?   [facility] - About COVID-19:   [email or web address]    CDC - What to Do If You're Sick:   www.cdc.gov/coronavirus/2019-ncov/about/steps-when-sick.html    January 14, 2021  RE:  Ellie Lewis                                                                                                                   [id number] [id number]      To whom it may concern:    I evaluated Ellie Lewis on January 14, 2021. Ellie Lewis should be excused from work/school.     They should let their workplace manager and staffing office know when their quarantine ends.    We can not give an exact date as it depends on the information below. They can calculate this on their own or work with their manager/staffing office to calculate this. (For example if they were exposed on 10/04, they would have to quarantine for 14 full days. That would be through 10/18. They could return on 10/19.)    Quarantine Guidelines:      If patient receives a positive COVID-19 test result, they should follow the guidance of those who are giving the results. Usually the return to work is 10 (or in some cases 20 days from symptom onset.) If they work at [employer], they must be cleared by Employee Occupational Health and Safety to return to work.        If patient receives a negative COVID-19 test result and did not have a high risk exposure to someone with a known positive COVID-19 test, they can return to work once they're free of fever for 24 hours without fever-reducing medication and their symptoms are improving or resolved.      If patient receives a negative COVID-19 test and if they had a high risk exposure to someone who has tested positive for COVID-19 then they can return to work 14 days after their last contact with the positive individual    Note: If there is ongoing exposure to the covid positive person, this quarantine period may be longer than 14 days. (For example, if they are continually exposed to their child, who tested positive and cannot isolate from them, then the quarantine of 7-14 days can't start until their child is no longer contagious. This is typically 10 days from onset to the child's symptoms. So the total duration may be 17-24 days in this case.)      Sincerely,  Rakel Ledesma PA-C

## 2021-01-16 LAB
SARS-COV-2 RNA RESP QL NAA+PROBE: NOT DETECTED
SPECIMEN SOURCE: NORMAL

## 2021-02-25 ENCOUNTER — OFFICE VISIT (OUTPATIENT)
Dept: FAMILY MEDICINE | Facility: CLINIC | Age: 71
End: 2021-02-25
Payer: COMMERCIAL

## 2021-02-25 VITALS
OXYGEN SATURATION: 97 % | TEMPERATURE: 98.4 F | BODY MASS INDEX: 28.66 KG/M2 | HEIGHT: 65 IN | HEART RATE: 67 BPM | WEIGHT: 172 LBS | DIASTOLIC BLOOD PRESSURE: 78 MMHG | SYSTOLIC BLOOD PRESSURE: 120 MMHG

## 2021-02-25 DIAGNOSIS — E03.9 ACQUIRED HYPOTHYROIDISM: ICD-10-CM

## 2021-02-25 DIAGNOSIS — E78.5 HYPERLIPIDEMIA LDL GOAL <130: ICD-10-CM

## 2021-02-25 DIAGNOSIS — Z00.00 ENCOUNTER FOR MEDICARE ANNUAL WELLNESS EXAM: Primary | ICD-10-CM

## 2021-02-25 DIAGNOSIS — Z78.0 POST-MENOPAUSAL: ICD-10-CM

## 2021-02-25 DIAGNOSIS — F33.41 RECURRENT MAJOR DEPRESSIVE DISORDER, IN PARTIAL REMISSION (H): ICD-10-CM

## 2021-02-25 LAB
CHOLEST SERPL-MCNC: 259 MG/DL
HDLC SERPL-MCNC: 72 MG/DL
LDLC SERPL CALC-MCNC: 151 MG/DL
NONHDLC SERPL-MCNC: 187 MG/DL
TRIGL SERPL-MCNC: 179 MG/DL
TSH SERPL DL<=0.005 MIU/L-ACNC: 1.14 MU/L (ref 0.4–4)

## 2021-02-25 PROCEDURE — 36415 COLL VENOUS BLD VENIPUNCTURE: CPT | Performed by: FAMILY MEDICINE

## 2021-02-25 PROCEDURE — 99213 OFFICE O/P EST LOW 20 MIN: CPT | Mod: 25 | Performed by: FAMILY MEDICINE

## 2021-02-25 PROCEDURE — 84443 ASSAY THYROID STIM HORMONE: CPT | Performed by: FAMILY MEDICINE

## 2021-02-25 PROCEDURE — 80061 LIPID PANEL: CPT | Performed by: FAMILY MEDICINE

## 2021-02-25 PROCEDURE — 99397 PER PM REEVAL EST PAT 65+ YR: CPT | Performed by: FAMILY MEDICINE

## 2021-02-25 RX ORDER — BUPROPION HYDROCHLORIDE 100 MG/1
TABLET, EXTENDED RELEASE ORAL
Qty: 180 TABLET | Refills: 3 | Status: SHIPPED | OUTPATIENT
Start: 2021-02-25 | End: 2021-12-21

## 2021-02-25 RX ORDER — FLUOXETINE 40 MG/1
CAPSULE ORAL
Qty: 90 CAPSULE | Refills: 3 | Status: SHIPPED | OUTPATIENT
Start: 2021-02-25 | End: 2022-01-24 | Stop reason: DRUGHIGH

## 2021-02-25 RX ORDER — LEVOTHYROXINE SODIUM 100 UG/1
TABLET ORAL
Qty: 90 TABLET | Refills: 3 | Status: SHIPPED | OUTPATIENT
Start: 2021-02-25 | End: 2021-12-21

## 2021-02-25 ASSESSMENT — ANXIETY QUESTIONNAIRES
GAD7 TOTAL SCORE: 2
5. BEING SO RESTLESS THAT IT IS HARD TO SIT STILL: NOT AT ALL
2. NOT BEING ABLE TO STOP OR CONTROL WORRYING: NOT AT ALL
3. WORRYING TOO MUCH ABOUT DIFFERENT THINGS: SEVERAL DAYS
IF YOU CHECKED OFF ANY PROBLEMS ON THIS QUESTIONNAIRE, HOW DIFFICULT HAVE THESE PROBLEMS MADE IT FOR YOU TO DO YOUR WORK, TAKE CARE OF THINGS AT HOME, OR GET ALONG WITH OTHER PEOPLE: NOT DIFFICULT AT ALL
6. BECOMING EASILY ANNOYED OR IRRITABLE: NOT AT ALL
7. FEELING AFRAID AS IF SOMETHING AWFUL MIGHT HAPPEN: NOT AT ALL
1. FEELING NERVOUS, ANXIOUS, OR ON EDGE: SEVERAL DAYS

## 2021-02-25 ASSESSMENT — PATIENT HEALTH QUESTIONNAIRE - PHQ9
5. POOR APPETITE OR OVEREATING: NOT AT ALL
SUM OF ALL RESPONSES TO PHQ QUESTIONS 1-9: 3

## 2021-02-25 ASSESSMENT — MIFFLIN-ST. JEOR: SCORE: 1291.07

## 2021-02-25 NOTE — PROGRESS NOTES
"  SUBJECTIVE:   Ellie Lewis is a 71 year old female who presents for Preventive Visit.    Patient has been advised of split billing requirements and indicates understanding: Yes  Are you in the first 12 months of your Medicare Part B coverage?  No    Physical Health:    In general, how would you rate your overall physical health? good    Outside of work, how many days during the week do you exercise? 6-7 days/week    Outside of work, approximately how many minutes a day do you exercise?15-30 minutes    If you drink alcohol do you typically have >3 drinks per day or >7 drinks per week? No    Do you usually eat at least 4 servings of fruit and vegetables a day, include whole grains & fiber and avoid regularly eating high fat or \"junk\" foods? Yes    Do you have any problems taking medications regularly?  No    Do you have any side effects from medications? Dry Mouth and bad weird dreams    Needs assistance for the following daily activities: no assistance needed    Which of the following safety concerns are present in your home?  none identified     Hearing impairment: Yes, ringing in the hears    In the past 6 months, have you been bothered by leaking of urine? yes    Mental Health:    In general, how would you rate your overall mental or emotional health? good  PHQ-2 Score:  1    PHQ 11/6/2018 1/10/2020 2/25/2021   PHQ-9 Total Score 5 4 3   Q9: Thoughts of better off dead/self-harm past 2 weeks Not at all Not at all Not at all     OZ-7 SCORE 11/6/2018 1/10/2020 2/25/2021   Total Score - 2 (minimal anxiety) -   Total Score 0 2 2           Do you feel safe in your environment? Yes    Have you ever done Advance Care Planning? (For example, a Health Directive, POLST, or a discussion with a medical provider or your loved ones about your wishes): Yes, patient states has an Advance Care Planning document and will bring a copy to the clinic.    Additional concerns to address?  No    Fall risk:  Fallen 2 or more times " in the past year?: Yes  Any fall with injury in the past year?: No    Cognitive Screenin) Repeat 3 items (Leader, Season, Table)    2) Clock draw: NORMAL  3) 3 item recall: Recalls 3 objects  Results: 3 items recalled: COGNITIVE IMPAIRMENT LESS LIKELY    Mini-CogTM Copyright GEOVANNA Brennna. Licensed by the author for use in [facility]; reprinted with permission (kiki@[facility]). All rights reserved.      Do you have sleep apnea, excessive snoring or daytime drowsiness?: no        Doing well on the wellbutrin and the prozac no current hypothyroid or hyperthyroid symptoms     Reviewed and updated as needed this visit by clinical staff  Tobacco                Reviewed and updated as needed this visit by Provider                Social History     Tobacco Use     Smoking status: Never Smoker     Smokeless tobacco: Never Used   Substance Use Topics     Alcohol use: No                           Current providers sharing in care for this patient include:   Patient Care Team:  Rima Agarwal MD as PCP - General (Family Practice)  Jostin Smith DO as Assigned PCP  Fox Bishop DO as Assigned Musculoskeletal Provider    The following health maintenance items are reviewed in Epic and correct as of today:  Health Maintenance   Topic Date Due     DEXA  1950     MAMMO SCREENING  2018     OZ ASSESSMENT  2020     PHQ-9  2020     FALL RISK ASSESSMENT  2021     MEDICARE ANNUAL WELLNESS VISIT  2022     COLORECTAL CANCER SCREENING  2023     ADVANCE CARE PLANNING  2025     LIPID  2025     DTAP/TDAP/TD IMMUNIZATION (2 - Td) 2027     HEPATITIS C SCREENING  Completed     DEPRESSION ACTION PLAN  Completed     INFLUENZA VACCINE  Completed     Pneumococcal Vaccine: 65+ Years  Completed     ZOSTER IMMUNIZATION  Completed     Pneumococcal Vaccine: Pediatrics (0 to 5 Years) and At-Risk Patients (6 to 64 Years)  Aged Out     IPV IMMUNIZATION  Aged Out      "MENINGITIS IMMUNIZATION  Aged Out     HEPATITIS B IMMUNIZATION  Aged Out     Lab work is in process  Mammogram Screening: Mammogram Screening - Mammography discussed and declined    ROS:  Constitutional, HEENT, cardiovascular, pulmonary, GI, , musculoskeletal, neuro, skin, endocrine and psych systems are negative, except as otherwise noted.    OBJECTIVE:   /78   Pulse 67   Temp 98.4  F (36.9  C) (Tympanic)   Ht 1.651 m (5' 5\")   Wt 78 kg (172 lb)   SpO2 97%   BMI 28.62 kg/m   Estimated body mass index is 28.62 kg/m  as calculated from the following:    Height as of this encounter: 1.651 m (5' 5\").    Weight as of this encounter: 78 kg (172 lb).  EXAM:   GENERAL: healthy, alert and no distress  HENT: ear canals and TM's normal, nose and mouth without ulcers or lesions  NECK: no adenopathy, no asymmetry, masses, or scars and thyroid normal to palpation  RESP: lungs clear to auscultation - no rales, rhonchi or wheezes  BREAST: normal without masses, tenderness or nipple discharge and no palpable axillary masses or adenopathy  CV: regular rate and rhythm, normal S1 S2, no S3 or S4, no murmur, click or rub, no peripheral edema and peripheral pulses strong  ABDOMEN: soft, nontender, no hepatosplenomegaly, no masses and bowel sounds normal  MS: no gross musculoskeletal defects noted, no edema  NEURO: Normal strength and tone, mentation intact and speech normal  MENTAL STATUS EXAM:    1. Clinical observations: Ellie was clean and was adequately groomed. Ellie's emotional presentation was open and cooperative. She spoke clear and articulate. She maintained good eye contact and she was cooperative in answering questions.   2. She appeared to be well-oriented in all spheres with coherent, logical, goal directed and relevent thinking.   3. Thought content: She denies no abnormal thought process.   4. Affect and mood: Skys affect is described as normal/appropriate and her emotional attitude was open and " cooperative. She reports the following sypmtoms: -----------------.    5. Sensorium and cognition: She was in contact with reality and oriented to time, place and person.  She demonstrated no impairment in immediate, recent, or remote memory. Her insight was adequate and her  intelligence appeared to be average.        Diagnostic Test Results:  Labs reviewed in Epic  Results for orders placed or performed in visit on 02/25/21   TSH with free T4 reflex     Status: None   Result Value Ref Range    TSH 1.14 0.40 - 4.00 mU/L   Lipid panel reflex to direct LDL Fasting     Status: Abnormal   Result Value Ref Range    Cholesterol 259 (H) <200 mg/dL    Triglycerides 179 (H) <150 mg/dL    HDL Cholesterol 72 >49 mg/dL    LDL Cholesterol Calculated 151 (H) <100 mg/dL    Non HDL Cholesterol 187 (H) <130 mg/dL       ASSESSMENT / PLAN:   1. Encounter for Medicare annual wellness exam      2. Recurrent major depressive disorder, in partial remission (H)  Well controlled at presetn   - buPROPion (WELLBUTRIN SR) 100 MG 12 hr tablet; TAKE 1 TABLET BY MOUTH TWO  TIMES DAILY (Needs follow-up appointment for this medication)  Dispense: 180 tablet; Refill: 3  - FLUoxetine (PROZAC) 40 MG capsule; TAKE 1 CAPSULE BY MOUTH  DAILY (Needs follow-up appointment for this medication)  Dispense: 90 capsule; Refill: 3  - Basic metabolic panel; Future    3. Acquired hypothyroidism  Cont current dose   - levothyroxine (SYNTHROID/LEVOTHROID) 100 MCG tablet; TAKE 1 TABLET BY MOUTH  DAILY (Needs follow-up appointment for this medication)  Dispense: 90 tablet; Refill: 3  - TSH with free T4 reflex  - Basic metabolic panel; Future    4. Hyperlipidemia LDL goal <130    - Lipid panel reflex to direct LDL Fasting    5. Post-menopausal    - DX Hip/Pelvis/Spine; Future    Patient has been advised of split billing requirements and indicates understanding: Yes    COUNSELING:  Reviewed preventive health counseling, as reflected in patient instructions    Estimated  "body mass index is 28.62 kg/m  as calculated from the following:    Height as of this encounter: 1.651 m (5' 5\").    Weight as of this encounter: 78 kg (172 lb).        She reports that she has never smoked. She has never used smokeless tobacco.    Appropriate preventive services were discussed with this patient, including applicable screening as appropriate for cardiovascular disease, diabetes, osteopenia/osteoporosis, and glaucoma.  As appropriate for age/gender, discussed screening for colorectal cancer, prostate cancer, breast cancer, and cervical cancer. Checklist reviewing preventive services available has been given to the patient.    Reviewed patients plan of care and provided an AVS. The Basic Care Plan (routine screening as documented in Health Maintenance) for Ellie meets the Care Plan requirement. This Care Plan has been established and reviewed with the Patient.    Counseling Resources:  ATP IV Guidelines  Pooled Cohorts Equation Calculator  Breast Cancer Risk Calculator  BRCA-Related Cancer Risk Assessment: FHS-7 Tool  FRAX Risk Assessment  ICSI Preventive Guidelines  Dietary Guidelines for Americans, 2010  USDA's MyPlate  ASA Prophylaxis  Lung CA Screening    Rima Agarwal MD  [facility]  "

## 2021-02-25 NOTE — PATIENT INSTRUCTIONS
Patient Education   Personalized Prevention Plan  You are due for the preventive services outlined below.  Your care team is available to assist you in scheduling these services.  If you have already completed any of these items, please share that information with your care team to update in your medical record.  Health Maintenance Due   Topic Date Due     Osteoporosis Screening  1950     Mammogram  12/05/2018     Anxiety Assessment  07/09/2020     Depression Assessment  07/09/2020     FALL RISK ASSESSMENT  02/24/2021      things look well   I will send you the results.

## 2021-02-26 ASSESSMENT — ANXIETY QUESTIONNAIRES: GAD7 TOTAL SCORE: 2

## 2021-03-06 ENCOUNTER — HEALTH MAINTENANCE LETTER (OUTPATIENT)
Age: 71
End: 2021-03-06

## 2021-08-22 ENCOUNTER — VIRTUAL VISIT (OUTPATIENT)
Dept: URGENT CARE | Facility: CLINIC | Age: 71
End: 2021-08-22
Payer: COMMERCIAL

## 2021-08-22 DIAGNOSIS — B97.89 VIRAL SINUSITIS: Primary | ICD-10-CM

## 2021-08-22 DIAGNOSIS — J32.9 VIRAL SINUSITIS: Primary | ICD-10-CM

## 2021-08-22 PROCEDURE — 99213 OFFICE O/P EST LOW 20 MIN: CPT | Mod: TEL | Performed by: PHYSICIAN ASSISTANT

## 2021-08-22 NOTE — PROGRESS NOTES
Ellie Lewis is being evaluated via a billable telephone visit.      Assessment & Plan:     Symptoms consistent with viral sinusitis, Mucinex & Flonase advised. Isolate while awaiting COVID test results.    See patient instructions below.  At the end of the encounter, I discussed diagnosis & medications. Discussed red flags for being seen in person at clinic/ER, as well as indications for follow up if no improvement. Patient understood and agreed to plan.     No diagnosis found.      No follow-ups on file.    Phone Call Duration : 6  minutes  Additional time spent documenting and reviewing chart:    ELISEO Lozano, NOY  [facility]  -----------------------------------------------------------------------------------------------------------------------------------------------------------------    Subjective:   Ellie Lewis is a 71 year old female who is contacted via telephone through scheduled urgent care virtual visit to discuss: No chief complaint on file.    Facial pain, sinus pressure, and nasal congestion/rhinorrhea, and cough onset 1 week ago. Also some nausea and dizziness if turns her head too quickly. Symptoms are improved today.  No treatments tried. Patient reports no fever/chills, myalgias, sore throat, loss of sense of taste or smell, headache, chest pain, shortness of breath, abdominal pain, weakness, vision changes, numbness/tingling, vomiting, diarrhea, rash, or any other symptoms. No known sick contact/COVID exposure. She had a COVID test yesterday, results still pending. She is vaccinated against COVID-19.     No past medical history on file.      Objective:   Gen:  NAD  Pulm: non-labored work of breathing    No results found for any visits on 08/22/21.    There are no Patient Instructions on file for this visit.

## 2021-08-22 NOTE — PATIENT INSTRUCTIONS
1.  I recommend using Mucinex to help thin mucus secretions.  Adding a nasal steroid spray such as Flonase can also be helpful with clearing sinus congestion.  3.  Take Tylenol 650mg every 4 hours or ibuprofen 600mg every 6 hours by mouth for pain/fever.  Do not exceed 4000mg of acetaminophen or 2400mg of ibuprofen from any source in a 24 hour period.  Taking Tylenol and ibuprofen together may be helpful in reducing pain.   4.  Follow-up if you not having any improvement in your symptoms over the next 5 days.    Sinusitis  The sinuses are air-filled spaces within the bones of the face. They connect to the inside of the nose. Sinusitis is an inflammation of the tissue that lines the sinuses. Sinusitis can occur during a cold. It can also happen due to allergies to pollens and other particles in the air. Sinusitis can cause symptoms of sinus congestion and a feeling of fullness. A sinus infection causes fever, headache, and facial pain. There is often green or yellow fluid draining from the nose or into the back of the throat (post-nasal drip). You have been given antibiotics to treat this condition.  Home care    Take the full course of antibiotics as instructed. Do not stop taking them, even when you feel better.    Drink plenty of water, hot tea, and other liquids. This may help thin nasal mucus. It also may help your sinuses drain fluids.    Heat may help soothe painful areas of your face. Use a towel soaked in hot water. Or,  the shower and direct the warm spray onto your face. Using a vaporizer along with a menthol rub at night may also help soothe symptoms.     An expectorant with guaifenesin may help thin nasal mucus and help your sinuses drain fluids.    You can use an over-the-counter decongestant, unless a similar medicine was prescribed to you. Nasal sprays work the fastest. Use one that contains phenylephrine or oxymetazoline. First blow your nose gently. Then use the spray. Do not use these  medicines more often than directed on the label. If you do, your symptoms may get worse. You may also take pills that contain pseudoephedrine. Don t use products that combine multiple medicines. This is because side effects may be increased. Read labels. You can also ask the pharmacist for help. (People with high blood pressure should not use decongestants. They can raise blood pressure.)    Over-the-counter antihistamines may help if allergies contributed to your sinusitis.      Do not use nasal rinses or irrigation during an acute sinus infection, unless your healthcare provider tells you to. Rinsing may spread the infection to other areas in your sinuses.    Use acetaminophen or ibuprofen to control pain, unless another pain medicine was prescribed to you. If you have chronic liver or kidney disease or ever had a stomach ulcer, talk with your healthcare provider before using these medicines. (Aspirin should never be taken by anyone under age 18 who is ill with a fever. It may cause severe liver damage.)    Don't smoke. This can make symptoms worse.  Follow-up care  Follow up with your healthcare provider or our staff if you are better in 1 week.  When to seek medical advice  Call your healthcare provider if any of these occur:    Facial pain or headache that gets worse    Stiff neck    Unusual drowsiness or confusion    Swelling of your forehead or eyelids    Vision problems, such as blurred or double vision    Fever of 100.4 F (38 C) or higher, or as directed by your healthcare provider    Seizure    Breathing problems    Symptoms don't go away in 10 days  Prevention  Here are steps you can take to help prevent an infection:    Keep good hand washing habits.    Don t have close contact with people who have sore throats, colds, or other upper respiratory infections.    Don t smoke, and stay away from secondhand smoke.    Stay up to date with of your vaccines.  Date Last Reviewed: 11/1/2017 2000-2017 [employer]  [employer]. [address] [place] [address], [place] [address]. All rights reserved. This information is not intended as a substitute for professional medical care. Always follow your healthcare professional's instructions.

## 2021-09-29 ENCOUNTER — TELEPHONE (OUTPATIENT)
Dept: FAMILY MEDICINE | Facility: CLINIC | Age: 71
End: 2021-09-29

## 2021-09-29 NOTE — TELEPHONE ENCOUNTER
AFR requested author to call patient regarding upcoming appointment tomorrow and symptoms requiring triage     Call placed to patient   Patient's  answered the phone stating patient was not home   took message that author is needing call back at [phone] regarding upcoming appointment   Awaiting return call    Kevin Cole RN

## 2021-09-29 NOTE — TELEPHONE ENCOUNTER
Patient returned phone call    Patient states she has had symptoms over a month     Reports feeling dyspneic with and without exertion   Reports the feeling is almost constant   States she feels fine when she is in bed sleeping  Denies wheezing   Denies pain with deep breathing   Denies chest pain  States she is able to take a deep breath   Patient conversing appropriately on the phone and did not need to stop and catch her breath     Reports a dry / non-productive cough that is intermittent  Though, when coughing starts it is hard to stop the coughing spell    Reports feeling fatigued over the last month as well  Reports weakness to bilateral lower extremities     Denies Covid-19 exposure   Fully vaccinated for Covid-19    Denies fevers  Denies OTC medications    Appointment scheduled with Claudette for 9/30/2021 at 1420    Kevin Cole RN

## 2021-09-30 ENCOUNTER — ANCILLARY PROCEDURE (OUTPATIENT)
Dept: GENERAL RADIOLOGY | Facility: CLINIC | Age: 71
End: 2021-09-30
Attending: NURSE PRACTITIONER
Payer: COMMERCIAL

## 2021-09-30 ENCOUNTER — OFFICE VISIT (OUTPATIENT)
Dept: FAMILY MEDICINE | Facility: CLINIC | Age: 71
End: 2021-09-30
Payer: COMMERCIAL

## 2021-09-30 VITALS
BODY MASS INDEX: 27.97 KG/M2 | TEMPERATURE: 98 F | HEART RATE: 64 BPM | WEIGHT: 174 LBS | HEIGHT: 66 IN | OXYGEN SATURATION: 64 % | RESPIRATION RATE: 16 BRPM | DIASTOLIC BLOOD PRESSURE: 82 MMHG | SYSTOLIC BLOOD PRESSURE: 120 MMHG

## 2021-09-30 DIAGNOSIS — H69.93 DYSFUNCTION OF BOTH EUSTACHIAN TUBES: Primary | ICD-10-CM

## 2021-09-30 DIAGNOSIS — R06.02 SHORTNESS OF BREATH: ICD-10-CM

## 2021-09-30 LAB
ALBUMIN SERPL-MCNC: 3.4 G/DL (ref 3.4–5)
ALP SERPL-CCNC: 75 U/L (ref 40–150)
ALT SERPL W P-5'-P-CCNC: 29 U/L (ref 0–50)
ANION GAP SERPL CALCULATED.3IONS-SCNC: 5 MMOL/L (ref 3–14)
AST SERPL W P-5'-P-CCNC: 23 U/L (ref 0–45)
BILIRUB SERPL-MCNC: 0.3 MG/DL (ref 0.2–1.3)
BUN SERPL-MCNC: 12 MG/DL (ref 7–30)
CALCIUM SERPL-MCNC: 9.1 MG/DL (ref 8.5–10.1)
CHLORIDE BLD-SCNC: 103 MMOL/L (ref 94–109)
CO2 SERPL-SCNC: 29 MMOL/L (ref 20–32)
CREAT SERPL-MCNC: 0.89 MG/DL (ref 0.52–1.04)
D DIMER PPP FEU-MCNC: 1.24 UG/ML FEU (ref 0–0.5)
ERYTHROCYTE [DISTWIDTH] IN BLOOD BY AUTOMATED COUNT: 12.7 % (ref 10–15)
GFR SERPL CREATININE-BSD FRML MDRD: 65 ML/MIN/1.73M2
GLUCOSE BLD-MCNC: 84 MG/DL (ref 70–99)
HCT VFR BLD AUTO: 41.8 % (ref 35–47)
HGB BLD-MCNC: 14.1 G/DL (ref 11.7–15.7)
MCH RBC QN AUTO: 31.8 PG (ref 26.5–33)
MCHC RBC AUTO-ENTMCNC: 33.7 G/DL (ref 31.5–36.5)
MCV RBC AUTO: 94 FL (ref 78–100)
PLATELET # BLD AUTO: 257 10E3/UL (ref 150–450)
POTASSIUM BLD-SCNC: 4.1 MMOL/L (ref 3.4–5.3)
PROT SERPL-MCNC: 7.1 G/DL (ref 6.8–8.8)
RBC # BLD AUTO: 4.43 10E6/UL (ref 3.8–5.2)
SODIUM SERPL-SCNC: 137 MMOL/L (ref 133–144)
WBC # BLD AUTO: 6.7 10E3/UL (ref 4–11)

## 2021-09-30 PROCEDURE — 80053 COMPREHEN METABOLIC PANEL: CPT | Performed by: NURSE PRACTITIONER

## 2021-09-30 PROCEDURE — 71046 X-RAY EXAM CHEST 2 VIEWS: CPT | Mod: FY | Performed by: RADIOLOGY

## 2021-09-30 PROCEDURE — 85027 COMPLETE CBC AUTOMATED: CPT | Performed by: NURSE PRACTITIONER

## 2021-09-30 PROCEDURE — 99213 OFFICE O/P EST LOW 20 MIN: CPT | Performed by: NURSE PRACTITIONER

## 2021-09-30 PROCEDURE — 85379 FIBRIN DEGRADATION QUANT: CPT | Performed by: NURSE PRACTITIONER

## 2021-09-30 PROCEDURE — 36415 COLL VENOUS BLD VENIPUNCTURE: CPT | Performed by: NURSE PRACTITIONER

## 2021-09-30 RX ORDER — FLUTICASONE PROPIONATE 50 MCG
1 SPRAY, SUSPENSION (ML) NASAL DAILY
COMMUNITY
Start: 2021-09-30 | End: 2021-12-13

## 2021-09-30 RX ORDER — LORATADINE 10 MG/1
10 TABLET ORAL DAILY
COMMUNITY
Start: 2021-09-30 | End: 2021-12-13

## 2021-09-30 ASSESSMENT — ENCOUNTER SYMPTOMS
FEVER: 0
DIARRHEA: 0
WHEEZING: 0
NAUSEA: 1
COUGH: 1
RHINORRHEA: 0
VOMITING: 0
SORE THROAT: 0
CHILLS: 0
FATIGUE: 1
HEADACHES: 1
DIZZINESS: 1
SINUS PRESSURE: 0

## 2021-09-30 ASSESSMENT — MIFFLIN-ST. JEOR: SCORE: 1316.01

## 2021-09-30 NOTE — PATIENT INSTRUCTIONS
Chest x-ray was negative. I'll contact you when the radiologist does a final read.     Labs ordered today. We'll contact you with those results.     Eustachian Tube Dysfunction (tube that helps drain your sinuses is inflamed):   1. Use Flonase daily until symptoms improve.   2. You can use Claritin OR Zyrtec daily to help with drying up drainage.   3. Humidification and hydration will help.

## 2021-09-30 NOTE — PROGRESS NOTES
"    Assessment & Plan     Dysfunction of both eustachian tubes  Symptoms could be related to this as patient has significant clear fluid behind TMs. Flonase and Claritin recommended.     - fluticasone (FLONASE) 50 MCG/ACT nasal spray; Spray 1 spray into both nostrils daily  - loratadine (CLARITIN) 10 MG tablet; Take 1 tablet (10 mg) by mouth daily    Shortness of breath  CXR ordered to r/o infection. Negative, awaiting radiology final review. Labs ordered to r/o other possible causes of SOB.     - XR Chest 2 Views; Future  - Comprehensive metabolic panel (BMP + Alb, Alk Phos, ALT, AST, Total. Bili, TP)  - CBC with platelets  - D dimer, quantitative             BMI:   Estimated body mass index is 28.08 kg/m  as calculated from the following:    Height as of this encounter: 1.676 m (5' 6\").    Weight as of this encounter: 78.9 kg (174 lb).       Patient Instructions   Chest x-ray was negative. I'll contact you when the radiologist does a final read.     Labs ordered today. We'll contact you with those results.     Eustachian Tube Dysfunction (tube that helps drain your sinuses is inflamed):   1. Use Flonase daily until symptoms improve.   2. You can use Claritin OR Zyrtec daily to help with drying up drainage.   3. Humidification and hydration will help.         Return if symptoms worsen or fail to improve.    MARIANN Solo CNP  [facility] GEOFF Singh is a 71 year old who presents for the following health issues     HPI     Acute Illness  Acute illness concerns: x one month ago  Onset/Duration: cough, SOB, fatigue  Symptoms:  Fever: no  Chills/Sweats: no  Headache (location?): YES at the beginning  Sinus Pressure: YES - a few weeks ago  Conjunctivitis:    Ear Pain: no  Rhinorrhea: no  Congestion: YES - sinus congestion and pressure at the beginning x 10 days to two weeks  Sore Throat: no  Cough: YES-non-productive, productive of clear sputum  Wheeze: no  Decreased Appetite: " "no  Nausea: YES - off and on dizzy nausea feeling  Vomiting: no  Diarrhea: no - recently constipation  Dysuria/Freq.: no  Dysuria or Hematuria: no  Fatigue/Achiness: YES- extreme  Sick/Strep Exposure: no  Therapies tried and outcome: None    Additional provider notes: Symptoms started 1 month ago with sinus symptoms, but now she has residual cough, SOB, and dizziness. Dizziness occasionally happens when she moves too fast or bends over. Room is not spinning. Feels hydrated. Dizziness is off and on. Main symptom she is concerned about is shortness of breath. Shortness of breath at rest. Denies pain/swelling in calves.     Had COVID vaccine.     Review of Systems   Constitutional: Positive for fatigue. Negative for chills and fever.   HENT: Positive for congestion. Negative for ear pain, rhinorrhea, sinus pressure (few weeks ago) and sore throat.    Respiratory: Positive for cough. Negative for wheezing.    Gastrointestinal: Positive for nausea. Negative for diarrhea and vomiting.   Genitourinary: Negative.    Neurological: Positive for dizziness and headaches.            Objective    /82 (BP Location: Right arm, Patient Position: Sitting, Cuff Size: Adult Large)   Pulse 64   Temp 98  F (36.7  C) (Tympanic)   Resp 16   Ht 1.676 m (5' 6\")   Wt 78.9 kg (174 lb)   SpO2 (!) 64%   Breastfeeding No   BMI 28.08 kg/m    Body mass index is 28.08 kg/m .  Physical Exam  Vitals and nursing note reviewed.   Constitutional:       General: She is not in acute distress.     Appearance: Normal appearance. She is not ill-appearing or toxic-appearing.   HENT:      Right Ear: Ear canal and external ear normal. Tympanic membrane is bulging.      Left Ear: Ear canal and external ear normal. Tympanic membrane is bulging.      Mouth/Throat:      Mouth: Mucous membranes are moist.      Pharynx: Oropharynx is clear. Posterior oropharyngeal erythema (posterior) present.   Cardiovascular:      Rate and Rhythm: Normal rate and " regular rhythm.      Pulses: Normal pulses.      Heart sounds: Normal heart sounds.   Pulmonary:      Effort: Pulmonary effort is normal.      Breath sounds: Normal breath sounds.   Skin:     General: Skin is warm and dry.   Neurological:      Mental Status: She is alert and oriented to person, place, and time.   Psychiatric:         Behavior: Behavior normal.            CXR - Reviewed and interpreted by me Normal- no infiltrates, effusions, pneumothoraces, cardiomegaly or masses

## 2021-10-01 ENCOUNTER — TELEPHONE (OUTPATIENT)
Dept: FAMILY MEDICINE | Facility: CLINIC | Age: 71
End: 2021-10-01

## 2021-10-01 ENCOUNTER — HOSPITAL ENCOUNTER (OUTPATIENT)
Dept: CT IMAGING | Facility: CLINIC | Age: 71
Discharge: HOME | End: 2021-10-01
Attending: NURSE PRACTITIONER | Admitting: NURSE PRACTITIONER
Payer: COMMERCIAL

## 2021-10-01 DIAGNOSIS — R79.89 ELEVATED D-DIMER: ICD-10-CM

## 2021-10-01 DIAGNOSIS — R06.02 SHORTNESS OF BREATH: Primary | ICD-10-CM

## 2021-10-01 DIAGNOSIS — R06.02 SHORTNESS OF BREATH: ICD-10-CM

## 2021-10-01 PROCEDURE — 71275 CT ANGIOGRAPHY CHEST: CPT

## 2021-10-01 PROCEDURE — 250N000009 HC RX 250: Performed by: NURSE PRACTITIONER

## 2021-10-01 PROCEDURE — 250N000011 HC RX IP 250 OP 636: Performed by: NURSE PRACTITIONER

## 2021-10-01 RX ORDER — IOPAMIDOL 755 MG/ML
76 INJECTION, SOLUTION INTRAVASCULAR ONCE
Status: COMPLETED | OUTPATIENT
Start: 2021-10-01 | End: 2021-10-01

## 2021-10-01 RX ADMIN — SODIUM CHLORIDE 100 ML: 9 INJECTION, SOLUTION INTRAVENOUS at 13:03

## 2021-10-01 RX ADMIN — IOPAMIDOL 76 ML: 755 INJECTION, SOLUTION INTRAVENOUS at 13:03

## 2021-10-01 NOTE — TELEPHONE ENCOUNTER
Discussed CT scan results with patient. She is not a smoker, so follow-up CT not recommended at this time. Recommended she continue with recommendations made in clinic yesterday and follow-up if symptoms do not improve.     Concerning signs/sx that would warrant urgent evaluation were discussed.  All questions were answered, patient understands and agrees with plan.       Perla Modi DNP, NP-C 10/1/2021 3:14 PM

## 2021-10-01 NOTE — TELEPHONE ENCOUNTER
Stat CT ordered due to elevated D-dimer and shortness of breath. Patient contacted and discussed testing with her.     Discussed that this is concerning and testing should be done today. She agrees.     She would prefer the latest appointment today if possible.     Please call [employer] to schedule this and call patient back on her cell phone.     Concerning signs/sx that would warrant urgent evaluation were discussed.  All questions were answered, patient understands and agrees with plan.         Thanks,  Perla Modi DNP, NP-C 10/1/2021 11:49 AM

## 2021-10-01 NOTE — TELEPHONE ENCOUNTER
[facility]oke with patient [facility] for [facility] and provider will be in contact with her for results.  Noemi Ward CMA

## 2021-10-09 ENCOUNTER — HEALTH MAINTENANCE LETTER (OUTPATIENT)
Age: 71
End: 2021-10-09

## 2021-12-09 ENCOUNTER — NURSE TRIAGE (OUTPATIENT)
Dept: NURSING | Facility: CLINIC | Age: 71
End: 2021-12-09
Payer: COMMERCIAL

## 2021-12-09 NOTE — TELEPHONE ENCOUNTER
Patient feels like her depression is worsening over the last few weeks.    Feels on the verge of tears all the time and has no interest in doing anything. Patient does not have any suicidal thoughts.  Sleeping well and eating well.    Per protocol, recommendations are for patient to be seen within 3 days. Patient has a virtual visit set up for Monday. Care advice given. Advised patient to call back if they develop any new or worsening symptoms. Patient verbalizes understanding and agrees with plan of care.    Mariaa Bynum RN  12/09/21 4:29 PM  [facility] Nurse Advisor    Reason for Disposition    Depression is worsening (e.g.,sleeping poorly, less able to do activities of daily living)    Additional Information    Negative: Patient is threatening suicide now    Negative: Patient attempted suicide    Negative: Violent behavior, or threatening to physically hurt or kill someone    Negative: Patient is very confused (disoriented, slurred speech) and no other adult (e.g., friend or family member) available    Negative: Difficult to awaken or acting very confused (disoriented, slurred speech) and new onset    Negative: Sounds like a life-threatening emergency to the triager    Negative: Alcohol use, abuse or dependence, question or problem related to    Negative: Drug abuse or dependence, question or problem related to    Negative: Suicide thoughts, threats, attempts, or questions    Negative: Anxiety is main problem or symptom    Negative: Depression and unable to do any of normal activities (e.g., self care, school, work; in comparison to baseline).    Negative: Very strange or confused behavior    Negative: Patient sounds very sick or weak to the triager    Negative: Fever > 101 F (38.3 C)    Negative: Sometimes has thoughts of suicide    Negative: Symptoms interfere with work or school    Protocols used: DEPRESSION-A-OH

## 2021-12-11 ASSESSMENT — ANXIETY QUESTIONNAIRES
5. BEING SO RESTLESS THAT IT IS HARD TO SIT STILL: NOT AT ALL
GAD7 TOTAL SCORE: 4
7. FEELING AFRAID AS IF SOMETHING AWFUL MIGHT HAPPEN: NOT AT ALL
GAD7 TOTAL SCORE: 4
GAD7 TOTAL SCORE: 4
3. WORRYING TOO MUCH ABOUT DIFFERENT THINGS: SEVERAL DAYS
4. TROUBLE RELAXING: SEVERAL DAYS
7. FEELING AFRAID AS IF SOMETHING AWFUL MIGHT HAPPEN: NOT AT ALL
6. BECOMING EASILY ANNOYED OR IRRITABLE: NOT AT ALL
2. NOT BEING ABLE TO STOP OR CONTROL WORRYING: SEVERAL DAYS
1. FEELING NERVOUS, ANXIOUS, OR ON EDGE: SEVERAL DAYS

## 2021-12-11 ASSESSMENT — PATIENT HEALTH QUESTIONNAIRE - PHQ9
10. IF YOU CHECKED OFF ANY PROBLEMS, HOW DIFFICULT HAVE THESE PROBLEMS MADE IT FOR YOU TO DO YOUR WORK, TAKE CARE OF THINGS AT HOME, OR GET ALONG WITH OTHER PEOPLE: SOMEWHAT DIFFICULT
SUM OF ALL RESPONSES TO PHQ QUESTIONS 1-9: 11
SUM OF ALL RESPONSES TO PHQ QUESTIONS 1-9: 11

## 2021-12-12 ASSESSMENT — PATIENT HEALTH QUESTIONNAIRE - PHQ9: SUM OF ALL RESPONSES TO PHQ QUESTIONS 1-9: 11

## 2021-12-12 ASSESSMENT — ANXIETY QUESTIONNAIRES: GAD7 TOTAL SCORE: 4

## 2021-12-13 ENCOUNTER — VIRTUAL VISIT (OUTPATIENT)
Dept: FAMILY MEDICINE | Facility: CLINIC | Age: 71
End: 2021-12-13
Payer: COMMERCIAL

## 2021-12-13 DIAGNOSIS — F33.41 RECURRENT MAJOR DEPRESSIVE DISORDER, IN PARTIAL REMISSION (H): Primary | ICD-10-CM

## 2021-12-13 DIAGNOSIS — F42.2 MIXED OBSESSIONAL THOUGHTS AND ACTS: ICD-10-CM

## 2021-12-13 DIAGNOSIS — E03.4 HYPOTHYROIDISM DUE TO ACQUIRED ATROPHY OF THYROID: ICD-10-CM

## 2021-12-13 PROCEDURE — 99442 PR PHYSICIAN TELEPHONE EVALUATION 11-20 MIN: CPT | Performed by: FAMILY MEDICINE

## 2021-12-13 ASSESSMENT — COLUMBIA-SUICIDE SEVERITY RATING SCALE - C-SSRS
6. HAVE YOU EVER DONE ANYTHING, STARTED TO DO ANYTHING, OR PREPARED TO DO ANYTHING TO END YOUR LIFE?: NO
2. IN THE PAST MONTH, HAVE YOU ACTUALLY HAD ANY THOUGHTS OF KILLING YOURSELF?: NO
1. WITHIN THE PAST MONTH, HAVE YOU WISHED YOU WERE DEAD OR WISHED YOU COULD GO TO SLEEP AND NOT WAKE UP?: YES

## 2021-12-13 NOTE — PROGRESS NOTES
"      Ellie Lewis is a 71 year old female who is being evaluated via a billable telephone visit.      What phone number would you like to be contacted at? [phone]  How would you like to obtain your   Assessment & Plan     Recurrent major depressive disorder, in partial remission (H)  Will increase te prozac consider increasing the wellbutrin if not enough change  - FLUoxetine (PROZAC) 20 MG capsule; Take 3 capsules (60 mg) by mouth daily    Hypothyroidism due to acquired atrophy of thyroid  Cont current dosing     Mixed obsessional thoughts and acts  This is in remission at present            BMI:   Estimated body mass index is 28.08 kg/m  as calculated from the following:    Height as of 9/30/21: 1.676 m (5' 6\").    Weight as of 9/30/21: 78.9 kg (174 lb).       Depression Screening Follow Up    PHQ 12/11/2021   PHQ-9 Total Score 11   Q9: Thoughts of better off dead/self-harm past 2 weeks Several days   F/U: Thoughts of suicide or self-harm No   F/U: Safety concerns Yes           C-SSRS (Brief [place]) 12/13/2021   Within the last month, have you wished you were dead or wished you could go to sleep and not wake up? Yes   Within the last month, have you had any actual thoughts of killing yourself? No   Within the last month, have you ever done anything, started to do anything, or prepared to do anything to end your life? No       Follow Up        Follow Up Actions Taken  Crisis resource information provided in the After Visit Summary  Patient declined referral.    Discussed the following ways the patient can remain in a safe environment:  be around others  See Patient Instructions    No follow-ups on file.    Rima Agarwal MD  [facility]? MyChart             Subjective     Ellie Lewis is a 71 year old female who presents via phone visit today for the following health issues:  She is calling because she has more anxiety and depression she is feeling more anxious . Has some " things that are making anxious   She is not suicidal she has no plan and would not do this she is somewhat overwhelmed with issues in the family but wants to address this bf it worsens. She is sleeping ok   We discussed therapy and she declines for now  We also discussed the possible need for a psychiatry referral and she is open to that.         Review of Systems   Constitutional, HEENT, cardiovascular, pulmonary, gi and gu systems are negative, except as otherwise noted.       Objective          Vitals:  No vitals were obtained today due to virtual visit.    alert, no distress and fatigued  PSYCH: Alert and oriented times 3; coherent speech, normal   rate and volume, able to articulate logical thoughts, able   to abstract reason, no tangential thoughts, no hallucinations   or delusions  Her affect is flat  RESP: No cough, no audible wheezing, able to talk in full sentences  Remainder of exam unable to be completed due to telephone visits    Office Visit on 09/30/2021   Component Date Value Ref Range Status     Sodium 09/30/2021 137  133 - 144 mmol/L Final     Potassium 09/30/2021 4.1  3.4 - 5.3 mmol/L Final     Chloride 09/30/2021 103  94 - 109 mmol/L Final     Carbon Dioxide (CO2) 09/30/2021 29  20 - 32 mmol/L Final     Anion Gap 09/30/2021 5  3 - 14 mmol/L Final     Urea Nitrogen 09/30/2021 12  7 - 30 mg/dL Final     Creatinine 09/30/2021 0.89  0.52 - 1.04 mg/dL Final     Calcium 09/30/2021 9.1  8.5 - 10.1 mg/dL Final     Glucose 09/30/2021 84  70 - 99 mg/dL Final     Alkaline Phosphatase 09/30/2021 75  40 - 150 U/L Final     AST 09/30/2021 23  0 - 45 U/L Final     ALT 09/30/2021 29  0 - 50 U/L Final     Protein Total 09/30/2021 7.1  6.8 - 8.8 g/dL Final     Albumin 09/30/2021 3.4  3.4 - 5.0 g/dL Final     Bilirubin Total 09/30/2021 0.3  0.2 - 1.3 mg/dL Final     GFR Estimate 09/30/2021 65  >60 mL/min/1.73m2 Final    As of July 11, 2021, eGFR is calculated by the CKD-EPI creatinine equation, without race  adjustment. eGFR can be influenced by muscle mass, exercise, and diet. The reported eGFR is an estimation only and is only applicable if the renal function is stable.     WBC Count 09/30/2021 6.7  4.0 - 11.0 10e3/uL Final     RBC Count 09/30/2021 4.43  3.80 - 5.20 10e6/uL Final     Hemoglobin 09/30/2021 14.1  11.7 - 15.7 g/dL Final     Hematocrit 09/30/2021 41.8  35.0 - 47.0 % Final     MCV 09/30/2021 94  78 - 100 fL Final     MCH 09/30/2021 31.8  26.5 - 33.0 pg Final     MCHC 09/30/2021 33.7  31.5 - 36.5 g/dL Final     RDW 09/30/2021 12.7  10.0 - 15.0 % Final     Platelet Count 09/30/2021 257  150 - 450 10e3/uL Final     D-Dimer Quantitative 09/30/2021 1.24* 0.00 - 0.50 ug/mL FEU Final           Phone call duration:  17 minutes      Rima Agarwal M.D.      956}  Depression Followup    How are you doing with your depression since your last visit? Worsened     Are you having other symptoms that might be associated with depression? No    Have you had a significant life event?  Relationship Concerns - Daughter    Are you feeling anxious or having panic attacks?   Yes:       Do you have any concerns with your use of alcohol or other drugs? No    Social History     Tobacco Use     Smoking status: Never Smoker     Smokeless tobacco: Never Used   Substance Use Topics     Alcohol use: No     Drug use: No     PHQ 1/10/2020 2/25/2021 12/11/2021   PHQ-9 Total Score 4 3 11   Q9: Thoughts of better off dead/self-harm past 2 weeks Not at all Not at all Several days   F/U: Thoughts of suicide or self-harm - - No   F/U: Safety concerns - - Yes     OZ-7 SCORE 1/10/2020 2/25/2021 12/11/2021   Total Score 2 (minimal anxiety) - 4 (minimal anxiety)   Total Score 2 2 4             Hypothyroidism Follow-up    Since last visit, patient describes the following symptoms: Weight stable, no hair loss, no skin changes, no constipation, no loose stools      Answers for HPI/ROS submitted by the patient on 12/11/2021  If you checked off any  problems, how difficult have these problems made it for you to do your work, take care of things at home, or get along with other people?: Somewhat difficult  PHQ9 TOTAL SCORE: 11  OZ 7 TOTAL SCORE: 4  Depression/Anxiety: Depression & Anxiety  Status since last visit:: worse  Anxiety since last: : medium  Other associated symptoms of depression:: No  Other associated symotome: : No  Significant life event: : other  Anxious:: Yes  Current substance use:: No  How many servings of fruits and vegetables do you eat daily?: 2-3  On average, how many sweetened beverages do you drink each day (Examples: soda, juice, sweet tea, etc.  Do NOT count diet or artificially sweetened beverages)?: 5  How many minutes a day do you exercise enough to make your heart beat faster?: 9 or less  How many days a week do you exercise enough to make your heart beat faster?: 3 or less  How many days per week do you miss taking your medication?: 0  Rima Agarwal M.D.

## 2021-12-13 NOTE — PATIENT INSTRUCTIONS
Please schedule virtual visit in 3 weeks or so. If worsening let me knopw and I can make the referral to psych and mental health

## 2021-12-19 DIAGNOSIS — E03.9 ACQUIRED HYPOTHYROIDISM: ICD-10-CM

## 2021-12-19 DIAGNOSIS — F33.41 RECURRENT MAJOR DEPRESSIVE DISORDER, IN PARTIAL REMISSION (H): ICD-10-CM

## 2021-12-21 RX ORDER — BUPROPION HYDROCHLORIDE 100 MG/1
TABLET, EXTENDED RELEASE ORAL
Qty: 180 TABLET | Refills: 3 | Status: SHIPPED | OUTPATIENT
Start: 2021-12-21 | End: 2022-01-24

## 2021-12-21 RX ORDER — LEVOTHYROXINE SODIUM 100 UG/1
TABLET ORAL
Qty: 90 TABLET | Refills: 0 | Status: SHIPPED | OUTPATIENT
Start: 2021-12-21 | End: 2022-01-24

## 2022-01-24 ENCOUNTER — VIRTUAL VISIT (OUTPATIENT)
Dept: FAMILY MEDICINE | Facility: CLINIC | Age: 72
End: 2022-01-24
Payer: COMMERCIAL

## 2022-01-24 DIAGNOSIS — E03.4 HYPOTHYROIDISM DUE TO ACQUIRED ATROPHY OF THYROID: Primary | ICD-10-CM

## 2022-01-24 DIAGNOSIS — F33.41 RECURRENT MAJOR DEPRESSIVE DISORDER, IN PARTIAL REMISSION (H): ICD-10-CM

## 2022-01-24 DIAGNOSIS — Z13.220 SCREENING FOR CHOLESTEROL LEVEL: ICD-10-CM

## 2022-01-24 DIAGNOSIS — E03.9 ACQUIRED HYPOTHYROIDISM: ICD-10-CM

## 2022-01-24 PROCEDURE — 99442 PR PHYSICIAN TELEPHONE EVALUATION 11-20 MIN: CPT | Performed by: FAMILY MEDICINE

## 2022-01-24 RX ORDER — LEVOTHYROXINE SODIUM 100 UG/1
TABLET ORAL
Qty: 90 TABLET | Refills: 3 | Status: SHIPPED | OUTPATIENT
Start: 2022-01-24 | End: 2022-12-21

## 2022-01-24 RX ORDER — BUPROPION HYDROCHLORIDE 100 MG/1
TABLET, EXTENDED RELEASE ORAL
Qty: 180 TABLET | Refills: 3 | Status: SHIPPED | OUTPATIENT
Start: 2022-01-24 | End: 2023-02-13

## 2022-01-24 ASSESSMENT — ANXIETY QUESTIONNAIRES
1. FEELING NERVOUS, ANXIOUS, OR ON EDGE: NOT AT ALL
3. WORRYING TOO MUCH ABOUT DIFFERENT THINGS: SEVERAL DAYS
6. BECOMING EASILY ANNOYED OR IRRITABLE: SEVERAL DAYS
5. BEING SO RESTLESS THAT IT IS HARD TO SIT STILL: NOT AT ALL
2. NOT BEING ABLE TO STOP OR CONTROL WORRYING: SEVERAL DAYS
7. FEELING AFRAID AS IF SOMETHING AWFUL MIGHT HAPPEN: NOT AT ALL
GAD7 TOTAL SCORE: 3

## 2022-01-24 ASSESSMENT — PATIENT HEALTH QUESTIONNAIRE - PHQ9
5. POOR APPETITE OR OVEREATING: NOT AT ALL
SUM OF ALL RESPONSES TO PHQ QUESTIONS 1-9: 8

## 2022-01-24 NOTE — PROGRESS NOTES
Ellie Lewis is a 71 year old female who is being evaluated via a billable telephone visit.      What phone number would you like to be contacted at? [phone]  How would you like to obtain your AVS? MyChart             Subjective     Ellie Lewis is a 71 year old female who presents via phone visit today for the following health issues:    Depression Followup    How are you doing with your depression since your last visit? Improved     Are you having other symptoms that might be associated with depression? No    Have you had a significant life event?  No     Are you feeling anxious or having panic attacks?   No    Do you have any concerns with your use of alcohol or other drugs? No    Social History     Tobacco Use     Smoking status: Never Smoker     Smokeless tobacco: Never Used   Substance Use Topics     Alcohol use: No     Drug use: No     PHQ 2/25/2021 12/11/2021 1/24/2022   PHQ-9 Total Score 3 11 8   Q9: Thoughts of better off dead/self-harm past 2 weeks Not at all Several days Not at all   F/U: Thoughts of suicide or self-harm - No -   F/U: Safety concerns - Yes -     OZ-7 SCORE 2/25/2021 12/11/2021 1/24/2022   Total Score - 4 (minimal anxiety) -   Total Score 2 4 3     dooing better on the higher dose of prozac  Due for annual next month will put in labs              Hypothyroidism Follow-up    Since last visit, patient describes the following symptoms: Weight stable, no hair loss, no skin changes, no constipation, no loose stools            Review of Systems   Constitutional, HEENT, cardiovascular, pulmonary, gi and gu systems are negative, except as otherwise noted.       Objective          Vitals:  No vitals were obtained today due to virtual visit.    healthy, alert and no distress  PSYCH: Alert and oriented times 3; coherent speech, normal   rate and volume, able to articulate logical thoughts, able   to abstract reason, no tangential thoughts, no hallucinations   or delusions  Her  affect is normal  RESP: No cough, no audible wheezing, able to talk in full sentences  Remainder of exam unable to be completed due to telephone visits    Office Visit on 09/30/2021   Component Date Value Ref Range Status     Sodium 09/30/2021 137  133 - 144 mmol/L Final     Potassium 09/30/2021 4.1  3.4 - 5.3 mmol/L Final     Chloride 09/30/2021 103  94 - 109 mmol/L Final     Carbon Dioxide (CO2) 09/30/2021 29  20 - 32 mmol/L Final     Anion Gap 09/30/2021 5  3 - 14 mmol/L Final     Urea Nitrogen 09/30/2021 12  7 - 30 mg/dL Final     Creatinine 09/30/2021 0.89  0.52 - 1.04 mg/dL Final     Calcium 09/30/2021 9.1  8.5 - 10.1 mg/dL Final     Glucose 09/30/2021 84  70 - 99 mg/dL Final     Alkaline Phosphatase 09/30/2021 75  40 - 150 U/L Final     AST 09/30/2021 23  0 - 45 U/L Final     ALT 09/30/2021 29  0 - 50 U/L Final     Protein Total 09/30/2021 7.1  6.8 - 8.8 g/dL Final     Albumin 09/30/2021 3.4  3.4 - 5.0 g/dL Final     Bilirubin Total 09/30/2021 0.3  0.2 - 1.3 mg/dL Final     GFR Estimate 09/30/2021 65  >60 mL/min/1.73m2 Final    As of July 11, 2021, eGFR is calculated by the CKD-EPI creatinine equation, without race adjustment. eGFR can be influenced by muscle mass, exercise, and diet. The reported eGFR is an estimation only and is only applicable if the renal function is stable.     WBC Count 09/30/2021 6.7  4.0 - 11.0 10e3/uL Final     RBC Count 09/30/2021 4.43  3.80 - 5.20 10e6/uL Final     Hemoglobin 09/30/2021 14.1  11.7 - 15.7 g/dL Final     Hematocrit 09/30/2021 41.8  35.0 - 47.0 % Final     MCV 09/30/2021 94  78 - 100 fL Final     MCH 09/30/2021 31.8  26.5 - 33.0 pg Final     MCHC 09/30/2021 33.7  31.5 - 36.5 g/dL Final     RDW 09/30/2021 12.7  10.0 - 15.0 % Final     Platelet Count 09/30/2021 257  150 - 450 10e3/uL Final     D-Dimer Quantitative 09/30/2021 1.24* 0.00 - 0.50 ug/mL FEU Final         1. Recurrent major depressive disorder, in partial remission (H)  doong better will mainatain the 60  mg  - FLUoxetine (PROZAC) 20 MG capsule; Take 3 capsules (60 mg) by mouth daily  Dispense: 270 capsule; Refill: 4  - buPROPion (WELLBUTRIN SR) 100 MG 12 hr tablet; TAKE 1 TABLET BY MOUTH  TWICE DAILY  Dispense: 180 tablet; Refill: 3    2. Acquired hypothyroidism  Check lab no sx   - levothyroxine (SYNTHROID/LEVOTHROID) 100 MCG tablet; TAKE 1 TABLET BY MOUTH  DAILY  Dispense: 90 tablet; Refill: 3    3. Hypothyroidism due to acquired atrophy of thyroid    - **TSH with free T4 reflex FUTURE 2mo; Future    4. Screening for cholesterol level  Will recheck as was higher last year.  - Lipid panel reflex to direct LDL Fasting; Future    Phone call duration:  14 minutes  Rima Agarwal M.D.

## 2022-01-25 ASSESSMENT — ANXIETY QUESTIONNAIRES: GAD7 TOTAL SCORE: 3

## 2022-03-29 ENCOUNTER — VIRTUAL VISIT (OUTPATIENT)
Dept: FAMILY MEDICINE | Facility: CLINIC | Age: 72
End: 2022-03-29
Payer: COMMERCIAL

## 2022-03-29 DIAGNOSIS — Z12.11 SCREENING FOR COLON CANCER: ICD-10-CM

## 2022-03-29 DIAGNOSIS — Z53.9 ERRONEOUS ENCOUNTER--DISREGARD: Primary | ICD-10-CM

## 2022-03-29 DIAGNOSIS — R13.10 DYSPHAGIA, UNSPECIFIED TYPE: ICD-10-CM

## 2022-03-29 NOTE — PROGRESS NOTES
Francisco is a 72 year old who is being evaluated via a billable telephone visit.      What phone number would you like to be contacted at? [phone]  How would you like to obtain your AVS? MyChart    Canceled telephone visit we will see in person in 2 days.    Subjective   Francisco is a 72 year old who presents for the following health issues     History of Present Illness       Reason for visit:  Shortness of breath pain swallowing  Symptom onset:  More than a month  Symptoms include:  Shortness of breath coughing fatigue swallowing issues  Symptom intensity:  Moderate  Symptom progression:  Staying the same  Had these symptoms before:  Yes  Has tried/received treatment for these symptoms:  Yes  Previous treatment was successful:  No  What makes it worse:  Too much physical activity  What makes it better:  Sleep    She eats 2-3 servings of fruits and vegetables daily.She consumes 1 sweetened beverage(s) daily.She exercises with enough effort to increase her heart rate 9 or less minutes per day.  She exercises with enough effort to increase her heart rate 3 or less days per week.   She is taking medications regularly.       Sometimes she has food gets caught in her throat at times   She has had more shortness of breath in the last few months  No chest pain  No fever or chills     She did come in previously almost 6 months ago due to this and it has been progressively gotten worse.  She does sign times also feels like food gets stuck and she cannot swallow it down.  At this point I realized that this virtual visit is not going to work so we will schedule her for Thursday afternoon at 2 for her checkup and also to further pursue this.          Objective             egd colonoscopy ordered             Phone call duration: 3 minutes Rima Agarwal M.D.     no

## 2022-03-31 ENCOUNTER — ANCILLARY PROCEDURE (OUTPATIENT)
Dept: GENERAL RADIOLOGY | Facility: CLINIC | Age: 72
End: 2022-03-31
Attending: FAMILY MEDICINE
Payer: COMMERCIAL

## 2022-03-31 ENCOUNTER — OFFICE VISIT (OUTPATIENT)
Dept: FAMILY MEDICINE | Facility: CLINIC | Age: 72
End: 2022-03-31
Payer: COMMERCIAL

## 2022-03-31 VITALS
HEIGHT: 66 IN | HEART RATE: 63 BPM | BODY MASS INDEX: 27.16 KG/M2 | WEIGHT: 169 LBS | OXYGEN SATURATION: 97 % | TEMPERATURE: 98 F | SYSTOLIC BLOOD PRESSURE: 138 MMHG | DIASTOLIC BLOOD PRESSURE: 70 MMHG

## 2022-03-31 DIAGNOSIS — E78.5 HYPERLIPIDEMIA LDL GOAL <100: ICD-10-CM

## 2022-03-31 DIAGNOSIS — Z13.220 SCREENING FOR CHOLESTEROL LEVEL: ICD-10-CM

## 2022-03-31 DIAGNOSIS — R05.3 PERSISTENT COUGH FOR 3 WEEKS OR LONGER: Primary | ICD-10-CM

## 2022-03-31 DIAGNOSIS — R05.3 PERSISTENT COUGH FOR 3 WEEKS OR LONGER: ICD-10-CM

## 2022-03-31 DIAGNOSIS — E03.4 HYPOTHYROIDISM DUE TO ACQUIRED ATROPHY OF THYROID: ICD-10-CM

## 2022-03-31 LAB
CHOLEST SERPL-MCNC: 276 MG/DL
FASTING STATUS PATIENT QL REPORTED: NO
HDLC SERPL-MCNC: 75 MG/DL
LDLC SERPL CALC-MCNC: 177 MG/DL
NONHDLC SERPL-MCNC: 201 MG/DL
TRIGL SERPL-MCNC: 119 MG/DL
TSH SERPL DL<=0.005 MIU/L-ACNC: 2.36 MU/L (ref 0.4–4)

## 2022-03-31 PROCEDURE — 36415 COLL VENOUS BLD VENIPUNCTURE: CPT | Performed by: FAMILY MEDICINE

## 2022-03-31 PROCEDURE — 84443 ASSAY THYROID STIM HORMONE: CPT | Performed by: FAMILY MEDICINE

## 2022-03-31 PROCEDURE — 71046 X-RAY EXAM CHEST 2 VIEWS: CPT | Mod: FY | Performed by: RADIOLOGY

## 2022-03-31 PROCEDURE — 99214 OFFICE O/P EST MOD 30 MIN: CPT | Performed by: FAMILY MEDICINE

## 2022-03-31 PROCEDURE — 80061 LIPID PANEL: CPT | Performed by: FAMILY MEDICINE

## 2022-03-31 NOTE — PROGRESS NOTES
Assessment & Plan     Persistent cough for 3 weeks or longer  Chest x-ray negative we will start some Prilosec and see if this will help with the cough.  - XR Chest 2 Views; Future  - omeprazole (PRILOSEC) 20 MG DR capsule; Take 1 capsule (20 mg) by mouth daily    Hypothyroidism due to acquired atrophy of thyroid  She will be due for this in 2 months  - **TSH with free T4 reflex FUTURE 2mo    Screening for cholesterol level  We will screen  - Lipid panel reflex to direct LDL Fasting       Results for orders placed or performed in visit on 03/31/22   XR Chest 2 Views     Status: None    Narrative    XR CHEST 2 VW   3/31/2022 2:43 PM     HISTORY: Persistent cough for 3 weeks or longer    COMPARISON: Chest CT 10/1/2021.      Impression    IMPRESSION: No focal infiltrate or pleural fluid. Normal heart size.  Prominent fat pad at the right cardiophrenic angle.       JACKY HERNANDEZ MD         SYSTEM ID:  [id number]   Results for orders placed or performed in visit on 03/31/22   **TSH with free T4 reflex FUTURE 2mo     Status: Normal   Result Value Ref Range    TSH 2.36 0.40 - 4.00 mU/L   Lipid panel reflex to direct LDL Fasting     Status: Abnormal   Result Value Ref Range    Cholesterol 276 (H) <200 mg/dL    Triglycerides 119 <150 mg/dL    Direct Measure HDL 75 >=50 mg/dL    LDL Cholesterol Calculated 177 (H) <=100 mg/dL    Non HDL Cholesterol 201 (H) <130 mg/dL    Patient Fasting > 8hrs? No     Narrative    Cholesterol  Desirable:  <200 mg/dL    Triglycerides  Normal:  Less than 150 mg/dL  Borderline High:  150-199 mg/dL  High:  200-499 mg/dL  Very High:  Greater than or equal to 500 mg/dL    Direct Measure HDL  Female:  Greater than or equal to 50 mg/dL   Male:  Greater than or equal to 40 mg/dL    LDL Cholesterol  Desirable:  <100mg/dL  Above Desirable:  100-129 mg/dL   Borderline High:  130-159 mg/dL   High:  160-189 mg/dL   Very High:  >= 190 mg/dL    Non HDL Cholesterol  Desirable:  130 mg/dL  Above Desirable:   "130-159 mg/dL  Borderline High:  160-189 mg/dL  High:  190-219 mg/dL  Very High:  Greater than or equal to 220 mg/dL           Return in about 3 weeks (around 4/21/2022) for if not improving.    Rima Agarwal MD  [facility] GEOFF Singh is a 72 year old who presents for the following health issues    HPI   Fatigue ,every once in a while she has a persistent cough no real heartburn   She had covid about 3 months     She is due for a thyroid check.  So we will order that other than the fatigue she has no other symptoms of hypothyroidism.  She is also due for screening lipid.  Review of Systems         Objective    /70   Pulse 63   Temp 98  F (36.7  C) (Tympanic)   Ht 1.676 m (5' 6\")   Wt 76.7 kg (169 lb)   SpO2 97%   BMI 27.28 kg/m    Body mass index is 27.28 kg/m .  Physical Exam   GENERAL: healthy, alert and no distress  NECK: no adenopathy, no asymmetry, masses, or scars and thyroid normal to palpation  RESP: lungs clear to auscultation - no rales, rhonchi or wheezes  CV: regular rate and rhythm, normal S1 S2, no S3 or S4, no murmur, click or rub, no peripheral edema and peripheral pulses strong  SKIN: no suspicious lesions or rashes    CXR - Reviewed and interpreted by me Normal- no infiltrates, effusions, pneumothoraces, cardiomegaly or masses    Rima Agarwal M.D.          "

## 2022-03-31 NOTE — PROGRESS NOTES
"  {PROVIDER CHARTING PREFERENCE:306834}    Bonita Singh is a 72 year old who presents for the following health issues     HPI     Chief Complaint   Patient presents with     Breathing Problem         {additonal problems for provider to add (Optional):691542}    Review of Systems   {ROS COMP (Optional):584856}      Objective    /70   Pulse 63   Temp 98  F (36.7  C) (Tympanic)   Ht 1.676 m (5' 6\")   Wt 76.7 kg (169 lb)   SpO2 97%   BMI 27.28 kg/m    Body mass index is 27.28 kg/m .  Physical Exam   {Exam List (Optional):280353}    {Diagnostic Test Results (Optional):530101}    {AMBULATORY ATTESTATION (Optional):214352}        "

## 2022-03-31 NOTE — H&P (VIEW-ONLY)
Assessment & Plan     Persistent cough for 3 weeks or longer  Chest x-ray negative we will start some Prilosec and see if this will help with the cough.  - XR Chest 2 Views; Future  - omeprazole (PRILOSEC) 20 MG DR capsule; Take 1 capsule (20 mg) by mouth daily    Hypothyroidism due to acquired atrophy of thyroid  She will be due for this in 2 months  - **TSH with free T4 reflex FUTURE 2mo    Screening for cholesterol level  We will screen  - Lipid panel reflex to direct LDL Fasting       Results for orders placed or performed in visit on 03/31/22   XR Chest 2 Views     Status: None    Narrative    XR CHEST 2 VW   3/31/2022 2:43 PM     HISTORY: Persistent cough for 3 weeks or longer    COMPARISON: Chest CT 10/1/2021.      Impression    IMPRESSION: No focal infiltrate or pleural fluid. Normal heart size.  Prominent fat pad at the right cardiophrenic angle.       JACKY HERNANDEZ MD         SYSTEM ID:  [id number]   Results for orders placed or performed in visit on 03/31/22   **TSH with free T4 reflex FUTURE 2mo     Status: Normal   Result Value Ref Range    TSH 2.36 0.40 - 4.00 mU/L   Lipid panel reflex to direct LDL Fasting     Status: Abnormal   Result Value Ref Range    Cholesterol 276 (H) <200 mg/dL    Triglycerides 119 <150 mg/dL    Direct Measure HDL 75 >=50 mg/dL    LDL Cholesterol Calculated 177 (H) <=100 mg/dL    Non HDL Cholesterol 201 (H) <130 mg/dL    Patient Fasting > 8hrs? No     Narrative    Cholesterol  Desirable:  <200 mg/dL    Triglycerides  Normal:  Less than 150 mg/dL  Borderline High:  150-199 mg/dL  High:  200-499 mg/dL  Very High:  Greater than or equal to 500 mg/dL    Direct Measure HDL  Female:  Greater than or equal to 50 mg/dL   Male:  Greater than or equal to 40 mg/dL    LDL Cholesterol  Desirable:  <100mg/dL  Above Desirable:  100-129 mg/dL   Borderline High:  130-159 mg/dL   High:  160-189 mg/dL   Very High:  >= 190 mg/dL    Non HDL Cholesterol  Desirable:  130 mg/dL  Above Desirable:   "130-159 mg/dL  Borderline High:  160-189 mg/dL  High:  190-219 mg/dL  Very High:  Greater than or equal to 220 mg/dL           Return in about 3 weeks (around 4/21/2022) for if not improving.    Rima Agarwal MD  [facility] GEOFF Singh is a 72 year old who presents for the following health issues    HPI   Fatigue ,every once in a while she has a persistent cough no real heartburn   She had covid about 3 months     She is due for a thyroid check.  So we will order that other than the fatigue she has no other symptoms of hypothyroidism.  She is also due for screening lipid.  Review of Systems         Objective    /70   Pulse 63   Temp 98  F (36.7  C) (Tympanic)   Ht 1.676 m (5' 6\")   Wt 76.7 kg (169 lb)   SpO2 97%   BMI 27.28 kg/m    Body mass index is 27.28 kg/m .  Physical Exam   GENERAL: healthy, alert and no distress  NECK: no adenopathy, no asymmetry, masses, or scars and thyroid normal to palpation  RESP: lungs clear to auscultation - no rales, rhonchi or wheezes  CV: regular rate and rhythm, normal S1 S2, no S3 or S4, no murmur, click or rub, no peripheral edema and peripheral pulses strong  SKIN: no suspicious lesions or rashes    CXR - Reviewed and interpreted by me Normal- no infiltrates, effusions, pneumothoraces, cardiomegaly or masses    Rima Agarwal M.D.          "

## 2022-04-03 RX ORDER — ATORVASTATIN CALCIUM 10 MG/1
10 TABLET, FILM COATED ORAL DAILY
Qty: 30 TABLET | Refills: 2 | Status: SHIPPED | OUTPATIENT
Start: 2022-04-03 | End: 2023-04-04

## 2022-04-04 DIAGNOSIS — Z11.59 ENCOUNTER FOR SCREENING FOR OTHER VIRAL DISEASES: Primary | ICD-10-CM

## 2022-04-04 DIAGNOSIS — E78.5 HYPERLIPIDEMIA LDL GOAL <100: ICD-10-CM

## 2022-04-04 RX ORDER — ATORVASTATIN CALCIUM 10 MG/1
TABLET, FILM COATED ORAL
Qty: 90 TABLET | OUTPATIENT
Start: 2022-04-04

## 2022-04-21 ENCOUNTER — ANESTHESIA EVENT (OUTPATIENT)
Dept: GASTROENTEROLOGY | Facility: CLINIC | Age: 72
End: 2022-04-21
Payer: COMMERCIAL

## 2022-04-21 NOTE — ANESTHESIA PREPROCEDURE EVALUATION
Anesthesia Pre-Procedure Evaluation    Patient: Ellie Lewis   MRN: [id number] : 1950        Procedure : Procedure(s):  COLONOSCOPY  ESOPHAGOGASTRODUODENOSCOPY (EGD)          Past Medical History:   Diagnosis Date     Arthritis      Depressive disorder 35 years ago     Thyroid disease 25 years ago      Past Surgical History:   Procedure Laterality Date     BIOPSY       COLONOSCOPY  5 years ago     EYE SURGERY  15 years ago    Cataracts     GENITOURINARY SURGERY  15 years ago     GYN SURGERY      Hysterectomy     left hip total arthroplasty Left 2019     ORTHOPEDIC SURGERY  2020    hip replacement      Allergies   Allergen Reactions     Sulfa Drugs Rash      Social History     Tobacco Use     Smoking status: Never Smoker     Smokeless tobacco: Never Used   Substance Use Topics     Alcohol use: No      Wt Readings from Last 1 Encounters:   22 76.7 kg (169 lb)        Anesthesia Evaluation   Pt has had prior anesthetic. Type: General and MAC.        ROS/MED HX  ENT/Pulmonary:  - neg pulmonary ROS     Neurologic:  - neg neurologic ROS     Cardiovascular:  - neg cardiovascular ROS     METS/Exercise Tolerance:     Hematologic:  - neg hematologic  ROS     Musculoskeletal:   (+) arthritis,     GI/Hepatic: Comment: dysphagia      Renal/Genitourinary:  - neg Renal ROS     Endo:     (+) thyroid problem, hypothyroidism,     Psychiatric/Substance Use:     (+) psychiatric history depression     Infectious Disease: Comment: Long haul covid      Malignancy:  - neg malignancy ROS     Other:  - neg other ROS          Physical Exam    Airway  airway exam normal      Mallampati: II   TM distance: > 3 FB   Neck ROM: full   Mouth opening: > 3 cm    Respiratory Devices and Support         Dental  no notable dental history         Cardiovascular   cardiovascular exam normal          Pulmonary   pulmonary exam normal                OUTSIDE LABS:  CBC:   Lab Results   Component Value Date    WBC 6.7  09/30/2021    WBC 7.0 12/02/2019    HGB 14.1 09/30/2021    HGB 14.0 12/02/2019    HCT 41.8 09/30/2021    HCT 42.7 12/02/2019     09/30/2021     12/02/2019     BMP:   Lab Results   Component Value Date     09/30/2021     12/02/2019    POTASSIUM 4.1 09/30/2021    POTASSIUM 3.9 12/02/2019    CHLORIDE 103 09/30/2021    CHLORIDE 102 12/02/2019    CO2 29 09/30/2021    CO2 29 12/02/2019    BUN 12 09/30/2021    BUN 13 12/02/2019    CR 0.89 09/30/2021    CR 0.80 12/02/2019    GLC 84 09/30/2021    GLC 75 03/05/2020     COAGS: No results found for: PTT, INR, FIBR  POC:   Lab Results   Component Value Date    HCG Negative 11/07/2016     HEPATIC:   Lab Results   Component Value Date    ALBUMIN 3.4 09/30/2021    PROTTOTAL 7.1 09/30/2021    ALT 29 09/30/2021    AST 23 09/30/2021    ALKPHOS 75 09/30/2021    BILITOTAL 0.3 09/30/2021     OTHER:   Lab Results   Component Value Date    A1C 5.6 04/22/2016    FAVIAN 9.1 09/30/2021    TSH 2.36 03/31/2022    T4 1.17 08/14/2017       Anesthesia Plan    ASA Status:  2   NPO Status:  NPO Appropriate    Anesthesia Type: General.   Induction: Intravenous.   Maintenance: TIVA.        Consents    Anesthesia Plan(s) and associated risks, benefits, and realistic alternatives discussed. Questions answered and patient/representative(s) expressed understanding.     - Discussed: Risks, Benefits and Alternatives for BOTH SEDATION and the PROCEDURE were discussed     - Discussed with:  Patient         Postoperative Care            Comments:                MARIANN Vasquez CRNA

## 2022-04-22 ENCOUNTER — LAB (OUTPATIENT)
Dept: LAB | Facility: CLINIC | Age: 72
End: 2022-04-22
Payer: COMMERCIAL

## 2022-04-22 DIAGNOSIS — Z11.59 ENCOUNTER FOR SCREENING FOR OTHER VIRAL DISEASES: ICD-10-CM

## 2022-04-22 PROCEDURE — U0003 INFECTIOUS AGENT DETECTION BY NUCLEIC ACID (DNA OR RNA); SEVERE ACUTE RESPIRATORY SYNDROME CORONAVIRUS 2 (SARS-COV-2) (CORONAVIRUS DISEASE [COVID-19]), AMPLIFIED PROBE TECHNIQUE, MAKING USE OF HIGH THROUGHPUT TECHNOLOGIES AS DESCRIBED BY CMS-2020-01-R: HCPCS

## 2022-04-22 PROCEDURE — U0005 INFEC AGEN DETEC AMPLI PROBE: HCPCS

## 2022-04-23 LAB — SARS-COV-2 RNA RESP QL NAA+PROBE: NEGATIVE

## 2022-04-25 ENCOUNTER — ANESTHESIA (OUTPATIENT)
Dept: GASTROENTEROLOGY | Facility: CLINIC | Age: 72
End: 2022-04-25
Payer: COMMERCIAL

## 2022-04-25 ENCOUNTER — HOSPITAL ENCOUNTER (OUTPATIENT)
Facility: CLINIC | Age: 72
Discharge: HOME | End: 2022-04-25
Attending: SURGERY | Admitting: SURGERY
Payer: COMMERCIAL

## 2022-04-25 VITALS
RESPIRATION RATE: 16 BRPM | SYSTOLIC BLOOD PRESSURE: 128 MMHG | DIASTOLIC BLOOD PRESSURE: 74 MMHG | TEMPERATURE: 97.9 F | HEART RATE: 57 BPM | OXYGEN SATURATION: 100 %

## 2022-04-25 LAB
COLONOSCOPY: NORMAL
UPPER GI ENDOSCOPY: NORMAL

## 2022-04-25 PROCEDURE — 250N000011 HC RX IP 250 OP 636: Performed by: NURSE ANESTHETIST, CERTIFIED REGISTERED

## 2022-04-25 PROCEDURE — 250N000009 HC RX 250: Performed by: NURSE ANESTHETIST, CERTIFIED REGISTERED

## 2022-04-25 PROCEDURE — 258N000003 HC RX IP 258 OP 636: Performed by: NURSE ANESTHETIST, CERTIFIED REGISTERED

## 2022-04-25 PROCEDURE — 88342 IMHCHEM/IMCYTCHM 1ST ANTB: CPT | Mod: TC | Performed by: SURGERY

## 2022-04-25 PROCEDURE — G0121 COLON CA SCRN NOT HI RSK IND: HCPCS | Performed by: SURGERY

## 2022-04-25 PROCEDURE — 43239 EGD BIOPSY SINGLE/MULTIPLE: CPT | Mod: 51 | Performed by: SURGERY

## 2022-04-25 PROCEDURE — 45378 DIAGNOSTIC COLONOSCOPY: CPT | Performed by: SURGERY

## 2022-04-25 PROCEDURE — 43239 EGD BIOPSY SINGLE/MULTIPLE: CPT | Performed by: SURGERY

## 2022-04-25 PROCEDURE — 370N000017 HC ANESTHESIA TECHNICAL FEE, PER MIN: Performed by: SURGERY

## 2022-04-25 RX ORDER — SODIUM CHLORIDE, SODIUM LACTATE, POTASSIUM CHLORIDE, CALCIUM CHLORIDE 600; 310; 30; 20 MG/100ML; MG/100ML; MG/100ML; MG/100ML
INJECTION, SOLUTION INTRAVENOUS CONTINUOUS
Status: DISCONTINUED | OUTPATIENT
Start: 2022-04-25 | End: 2022-04-25 | Stop reason: HOSPADM

## 2022-04-25 RX ORDER — LIDOCAINE 40 MG/G
CREAM TOPICAL
Status: DISCONTINUED | OUTPATIENT
Start: 2022-04-25 | End: 2022-04-25 | Stop reason: HOSPADM

## 2022-04-25 RX ORDER — PROPOFOL 10 MG/ML
INJECTION, EMULSION INTRAVENOUS CONTINUOUS PRN
Status: DISCONTINUED | OUTPATIENT
Start: 2022-04-25 | End: 2022-04-25

## 2022-04-25 RX ADMIN — LIDOCAINE HYDROCHLORIDE 50 MG: 10 INJECTION, SOLUTION EPIDURAL; INFILTRATION; INTRACAUDAL; PERINEURAL at 16:39

## 2022-04-25 RX ADMIN — LIDOCAINE HYDROCHLORIDE 0.2 ML: 10 INJECTION, SOLUTION EPIDURAL; INFILTRATION; INTRACAUDAL; PERINEURAL at 15:01

## 2022-04-25 RX ADMIN — TOPICAL ANESTHETIC 1 SPRAY: 200 SPRAY DENTAL; PERIODONTAL at 16:38

## 2022-04-25 RX ADMIN — PROPOFOL 200 MCG/KG/MIN: 10 INJECTION, EMULSION INTRAVENOUS at 16:38

## 2022-04-25 RX ADMIN — SODIUM CHLORIDE, POTASSIUM CHLORIDE, SODIUM LACTATE AND CALCIUM CHLORIDE: 600; 310; 30; 20 INJECTION, SOLUTION INTRAVENOUS at 15:02

## 2022-04-25 NOTE — LETTER
Ellie Lewis  [address]  [place] [address]  May 1, 2022    Dear Ellie,   This letter is to inform you of the results of your pathology report on your upper endoscopy (EGD).    Your pathology report was:  Final Diagnosis   Stomach, biopsy-  - Chronic inactive gastritis with focal intestinal metaplasia.  - Negative for gastric epithelial dysplasia and malignancy.  - Negative for Helicobacter pylori forms.  - Sampling includes: Gastric antral mucosa.     Showed findings consistent with a mildly inflamed stomach. If you continue to have symptoms please follow up with your primary care doctor or with myself.  I recommend a one month trial of OTC omeprazole to see if this will improve your symptoms.     If you have any questions or concerns please do not hesitate to call my office at [phone].  Sincerely,     [facility], DO  [facility]

## 2022-04-25 NOTE — ANESTHESIA POSTPROCEDURE EVALUATION
Patient: Ellie Lewis    Procedure: Procedure(s):  COLONOSCOPY  ESOPHAGOGASTRODUODENOSCOPY, WITH BIOPSY       Anesthesia Type:  General    Note:  Disposition: Outpatient   Postop Pain Control: Uneventful            Sign Out: Well controlled pain   PONV: No   Neuro/Psych: Uneventful            Sign Out: Acceptable/Baseline neuro status   Airway/Respiratory: Uneventful            Sign Out: Acceptable/Baseline resp. status   CV/Hemodynamics: Uneventful            Sign Out: Acceptable CV status; No obvious hypovolemia; No obvious fluid overload   Other NRE: NONE   DID A NON-ROUTINE EVENT OCCUR? No           Last vitals:  Vitals Value Taken Time   BP     Temp     Pulse     Resp     SpO2         Electronically Signed By: Alivia Hooper CRNA, APRN CRNA  April 25, 2022  5:07 PM

## 2022-04-25 NOTE — INTERVAL H&P NOTE
"I have reviewed the surgical (or preoperative) H&P that is linked to this encounter, and examined the patient. There are no significant changes    diarrhea; no blood thinner; last colonoscopy 2013; no blood thinner; no famhx of colon cancer    Clinical Conditions Present on Arrival:  Clinically Significant Risk Factors Present on Admission                   # Overweight: Estimated body mass index is 27.28 kg/m  as calculated from the following:    Height as of 3/31/22: 1.676 m (5' 6\").    Weight as of 3/31/22: 76.7 kg (169 lb).     [facility], DO    "

## 2022-04-29 LAB
PATH REPORT.COMMENTS IMP SPEC: NORMAL
PATH REPORT.FINAL DX SPEC: NORMAL
PATH REPORT.GROSS SPEC: NORMAL
PATH REPORT.MICROSCOPIC SPEC OTHER STN: NORMAL
PATH REPORT.RELEVANT HX SPEC: NORMAL
PHOTO IMAGE: NORMAL

## 2022-04-29 PROCEDURE — 88342 IMHCHEM/IMCYTCHM 1ST ANTB: CPT | Mod: 26

## 2022-04-29 PROCEDURE — 88305 TISSUE EXAM BY PATHOLOGIST: CPT | Mod: 26

## 2022-05-16 ENCOUNTER — HEALTH MAINTENANCE LETTER (OUTPATIENT)
Age: 72
End: 2022-05-16

## 2022-09-11 ENCOUNTER — HEALTH MAINTENANCE LETTER (OUTPATIENT)
Age: 72
End: 2022-09-11

## 2022-12-20 DIAGNOSIS — E03.9 ACQUIRED HYPOTHYROIDISM: ICD-10-CM

## 2022-12-21 RX ORDER — LEVOTHYROXINE SODIUM 100 UG/1
TABLET ORAL
Qty: 100 TABLET | Refills: 0 | Status: SHIPPED | OUTPATIENT
Start: 2022-12-21 | End: 2023-03-27

## 2023-02-28 ENCOUNTER — TELEPHONE (OUTPATIENT)
Dept: FAMILY MEDICINE | Facility: CLINIC | Age: 73
End: 2023-02-28
Payer: COMMERCIAL

## 2023-02-28 NOTE — TELEPHONE ENCOUNTER
Patient Quality Outreach    Patient is due for the following:   Depression  -  PHQ-9 needed and Depression follow-up visit  Physical Annual Wellness Visit    Next Steps:   Schedule a Annual Wellness Visit  Patient was assigned appropriate questionnaire to complete    Type of outreach:    Sent BABADU message.    Next Steps:  Reach out within 90 days via Phone.    Max number of attempts reached: No. Will try again in 90 days if patient still on fail list.    Questions for provider review:    None     Carmen Laws CMA  Chart routed to  .

## 2023-03-01 NOTE — TELEPHONE ENCOUNTER
PHQ 12/11/2021 1/24/2022 2/28/2023   PHQ-9 Total Score 11 8 5   Q9: Thoughts of better off dead/self-harm past 2 weeks Several days Not at all Not at all   F/U: Thoughts of suicide or self-harm No - -   F/U: Safety concerns Yes - -     OZ-7 SCORE 12/11/2021 1/24/2022 2/28/2023   Total Score 4 (minimal anxiety) - 3 (minimal anxiety)   Total Score 4 3 3

## 2023-03-25 DIAGNOSIS — E03.9 ACQUIRED HYPOTHYROIDISM: ICD-10-CM

## 2023-03-27 NOTE — TELEPHONE ENCOUNTER
Routing refill request to provider for review/approval because:  Patient needs to be seen because:  Greater than 1 year since last annual physical. Has appointment scheduled for 4/4/23 with Dr Any Winkler, RN

## 2023-03-28 RX ORDER — LEVOTHYROXINE SODIUM 100 UG/1
TABLET ORAL
Qty: 100 TABLET | Refills: 2 | Status: SHIPPED | OUTPATIENT
Start: 2023-03-28 | End: 2023-04-04

## 2023-04-03 ASSESSMENT — ENCOUNTER SYMPTOMS
FREQUENCY: 1
MYALGIAS: 0
NAUSEA: 0
PALPITATIONS: 0
EYE PAIN: 0
NERVOUS/ANXIOUS: 1
PARESTHESIAS: 0
HEADACHES: 0
HEMATURIA: 0
CONSTIPATION: 0
DIZZINESS: 0
JOINT SWELLING: 0
HEMATOCHEZIA: 0
HEARTBURN: 0
BREAST MASS: 0
DIARRHEA: 0
WEAKNESS: 0
ARTHRALGIAS: 0
SORE THROAT: 0
COUGH: 1
ABDOMINAL PAIN: 0
SHORTNESS OF BREATH: 0
DYSURIA: 0
CHILLS: 0
FEVER: 0

## 2023-04-03 ASSESSMENT — ACTIVITIES OF DAILY LIVING (ADL): CURRENT_FUNCTION: NO ASSISTANCE NEEDED

## 2023-04-03 ASSESSMENT — PATIENT HEALTH QUESTIONNAIRE - PHQ9
10. IF YOU CHECKED OFF ANY PROBLEMS, HOW DIFFICULT HAVE THESE PROBLEMS MADE IT FOR YOU TO DO YOUR WORK, TAKE CARE OF THINGS AT HOME, OR GET ALONG WITH OTHER PEOPLE: SOMEWHAT DIFFICULT
SUM OF ALL RESPONSES TO PHQ QUESTIONS 1-9: 13
SUM OF ALL RESPONSES TO PHQ QUESTIONS 1-9: 13

## 2023-04-04 ENCOUNTER — OFFICE VISIT (OUTPATIENT)
Dept: FAMILY MEDICINE | Facility: CLINIC | Age: 73
End: 2023-04-04
Payer: COMMERCIAL

## 2023-04-04 VITALS
WEIGHT: 171 LBS | OXYGEN SATURATION: 96 % | TEMPERATURE: 98.2 F | BODY MASS INDEX: 28.49 KG/M2 | SYSTOLIC BLOOD PRESSURE: 138 MMHG | HEART RATE: 66 BPM | DIASTOLIC BLOOD PRESSURE: 84 MMHG | HEIGHT: 65 IN

## 2023-04-04 DIAGNOSIS — E03.4 HYPOTHYROIDISM DUE TO ACQUIRED ATROPHY OF THYROID: ICD-10-CM

## 2023-04-04 DIAGNOSIS — E78.5 HYPERLIPIDEMIA LDL GOAL <100: ICD-10-CM

## 2023-04-04 DIAGNOSIS — Z00.00 ENCOUNTER FOR MEDICARE ANNUAL WELLNESS EXAM: Primary | ICD-10-CM

## 2023-04-04 DIAGNOSIS — E03.9 ACQUIRED HYPOTHYROIDISM: ICD-10-CM

## 2023-04-04 DIAGNOSIS — F33.41 RECURRENT MAJOR DEPRESSIVE DISORDER, IN PARTIAL REMISSION (H): ICD-10-CM

## 2023-04-04 DIAGNOSIS — N39.42 URINARY INCONTINENCE WITHOUT SENSORY AWARENESS: ICD-10-CM

## 2023-04-04 DIAGNOSIS — Z78.0 POSTMENOPAUSAL STATUS: ICD-10-CM

## 2023-04-04 LAB
ALBUMIN UR-MCNC: NEGATIVE MG/DL
ANION GAP SERPL CALCULATED.3IONS-SCNC: 10 MMOL/L (ref 7–15)
APPEARANCE UR: CLEAR
BACTERIA #/AREA URNS HPF: ABNORMAL /HPF
BILIRUB UR QL STRIP: NEGATIVE
BUN SERPL-MCNC: 11.9 MG/DL (ref 8–23)
CALCIUM SERPL-MCNC: 9.9 MG/DL (ref 8.8–10.2)
CHLORIDE SERPL-SCNC: 101 MMOL/L (ref 98–107)
COLOR UR AUTO: YELLOW
CREAT SERPL-MCNC: 0.82 MG/DL (ref 0.51–0.95)
DEPRECATED HCO3 PLAS-SCNC: 28 MMOL/L (ref 22–29)
GFR SERPL CREATININE-BSD FRML MDRD: 75 ML/MIN/1.73M2
GLUCOSE SERPL-MCNC: 82 MG/DL (ref 70–99)
GLUCOSE UR STRIP-MCNC: NEGATIVE MG/DL
HGB UR QL STRIP: ABNORMAL
KETONES UR STRIP-MCNC: NEGATIVE MG/DL
LEUKOCYTE ESTERASE UR QL STRIP: ABNORMAL
NITRATE UR QL: NEGATIVE
PH UR STRIP: 5.5 [PH] (ref 5–7)
POTASSIUM SERPL-SCNC: 4.3 MMOL/L (ref 3.4–5.3)
RBC #/AREA URNS AUTO: ABNORMAL /HPF
SODIUM SERPL-SCNC: 139 MMOL/L (ref 136–145)
SP GR UR STRIP: >=1.03 (ref 1–1.03)
SQUAMOUS #/AREA URNS AUTO: ABNORMAL /LPF
TSH SERPL DL<=0.005 MIU/L-ACNC: 1.25 UIU/ML (ref 0.3–4.2)
UROBILINOGEN UR STRIP-ACNC: 0.2 E.U./DL
WBC #/AREA URNS AUTO: ABNORMAL /HPF

## 2023-04-04 PROCEDURE — 84443 ASSAY THYROID STIM HORMONE: CPT | Performed by: FAMILY MEDICINE

## 2023-04-04 PROCEDURE — 36415 COLL VENOUS BLD VENIPUNCTURE: CPT | Performed by: FAMILY MEDICINE

## 2023-04-04 PROCEDURE — 99214 OFFICE O/P EST MOD 30 MIN: CPT | Mod: 25 | Performed by: FAMILY MEDICINE

## 2023-04-04 PROCEDURE — 81001 URINALYSIS AUTO W/SCOPE: CPT | Performed by: FAMILY MEDICINE

## 2023-04-04 PROCEDURE — 80048 BASIC METABOLIC PNL TOTAL CA: CPT | Performed by: FAMILY MEDICINE

## 2023-04-04 PROCEDURE — G0439 PPPS, SUBSEQ VISIT: HCPCS | Performed by: FAMILY MEDICINE

## 2023-04-04 RX ORDER — AMOXICILLIN 500 MG/1
TABLET, FILM COATED ORAL
COMMUNITY
Start: 2023-03-15 | End: 2023-10-19

## 2023-04-04 RX ORDER — LEVOTHYROXINE SODIUM 100 UG/1
100 TABLET ORAL DAILY
Qty: 100 TABLET | Refills: 3 | Status: SHIPPED | OUTPATIENT
Start: 2023-04-04 | End: 2024-04-07

## 2023-04-04 RX ORDER — BUPROPION HYDROCHLORIDE 100 MG/1
TABLET, EXTENDED RELEASE ORAL
Qty: 180 TABLET | Refills: 0 | Status: SHIPPED | OUTPATIENT
Start: 2023-04-04 | End: 2023-07-26

## 2023-04-04 RX ORDER — ATORVASTATIN CALCIUM 10 MG/1
10 TABLET, FILM COATED ORAL DAILY
Qty: 90 TABLET | Refills: 3 | Status: SHIPPED | OUTPATIENT
Start: 2023-04-04 | End: 2023-08-09

## 2023-04-04 ASSESSMENT — ENCOUNTER SYMPTOMS
FREQUENCY: 1
HEMATURIA: 0
ABDOMINAL PAIN: 0
HEADACHES: 0
DIARRHEA: 0
DYSURIA: 0
FEVER: 0
PARESTHESIAS: 0
ARTHRALGIAS: 0
PALPITATIONS: 0
COUGH: 1
SHORTNESS OF BREATH: 0
CHILLS: 0
JOINT SWELLING: 0
DIZZINESS: 0
CONSTIPATION: 0
EYE PAIN: 0
WEAKNESS: 0
HEARTBURN: 0
HEMATOCHEZIA: 0
NERVOUS/ANXIOUS: 1
SORE THROAT: 0
MYALGIAS: 0
NAUSEA: 0
BREAST MASS: 0

## 2023-04-04 ASSESSMENT — ACTIVITIES OF DAILY LIVING (ADL): CURRENT_FUNCTION: NO ASSISTANCE NEEDED

## 2023-04-04 NOTE — PROGRESS NOTES
"SUBJECTIVE:   Francisco is a 73 year old who presents for Preventive Visit.  Patient has been advised of split billing requirements and indicates understanding: Yes  Are you in the first 12 months of your Medicare coverage?  No    Healthy Habits:     In general, how would you rate your overall health?  Fair    Frequency of exercise:  1 day/week    Duration of exercise:  15-30 minutes    Do you usually eat at least 4 servings of fruit and vegetables a day, include whole grains    & fiber and avoid regularly eating high fat or \"junk\" foods?  Yes    Taking medications regularly:  Yes    Medication side effects:  Not applicable    Ability to successfully perform activities of daily living:  No assistance needed    Home Safety:  No safety concerns identified    Hearing Impairment:  Difficulty following dialogue in the theater    In the past 6 months, have you been bothered by leaking of urine? Yes    In general, how would you rate your overall mental or emotional health?  Poor      PHQ-2 Total Score: 5    Additional concerns today:  Yes    Hypertension Follow-up    Do you check your blood pressure regularly outside of the clinic? No     Are you following a low salt diet? Yes    Are your blood pressures ever more than 140 on the top number (systolic) OR more   than 90 on the bottom number (diastolic), for example 140/90? No    Hypothyroidism Follow-up    Since last visit, patient describes the following symptoms: Weight stable, no hair loss, no skin changes, no constipation, no loose stools    Depression and Anxiety Follow-Up    How are you doing with your depression since your last visit? struggling     How are you doing with your anxiety since your last visit?      Are you having other symptoms that might be associated with depression or anxiety? No    Have you had a significant life event? No     Do you have any concerns with your use of alcohol or other drugs? No    Social History     Tobacco Use     Smoking status: Never "     Smokeless tobacco: Never   Substance Use Topics     Alcohol use: No     Drug use: No         1/24/2022     8:58 AM 2/28/2023     8:34 PM 4/3/2023     9:16 AM   PHQ   PHQ-9 Total Score 8 5 13   Q9: Thoughts of better off dead/self-harm past 2 weeks Not at all Not at all Not at all         12/11/2021     9:20 PM 1/24/2022     8:58 AM 2/28/2023     8:35 PM   OZ-7 SCORE   Total Score 4 (minimal anxiety)  3 (minimal anxiety)   Total Score 4 3 3       Have you ever done Advance Care Planning? (For example, a Health Directive, POLST, or a discussion with a medical provider or your loved ones about your wishes): no       Fall risk  Fallen 2 or more times in the past year?: Yes  Any fall with injury in the past year?: No    Cognitive Screening   1) Repeat 3 items (Leader, Season, Table)    2) Clock draw: NORMAL  3) 3 item recall:   Results: NORMAL clock, 1-2 items recalled: COGNITIVE IMPAIRMENT LESS LIKELY    Mini-CogTM Copyright GEOVANNA Brennan. Licensed by the author for use in [facility]; reprinted with permission (kiki@[facility]). All rights reserved.          Reviewed and updated as needed this visit by clinical staff                  Reviewed and updated as needed this visit by Provider                 Social History     Tobacco Use     Smoking status: Never     Smokeless tobacco: Never   Vaping Use     Vaping status: Not on file   Substance Use Topics     Alcohol use: No             4/3/2023     9:16 AM   Alcohol Use   Prescreen: >3 drinks/day or >7 drinks/week? Not Applicable         Do you have a current opioid prescription? No  Do you use any other controlled substances or medications that are not prescribed by a provider? None            Current providers sharing in care for this patient include:   Patient Care Team:  Rima Agarwal MD as PCP - General (Family Practice)  Rima Agarwal MD as Assigned PCP    The following health maintenance items are reviewed in Epic and correct as of today:  Health  Maintenance   Topic Date Due     DEXA  Never done     DTAP/TDAP/TD IMMUNIZATION (1 - Tdap) 11/22/2017     COVID-19 Vaccine (3 - Booster for Pfizer series) 07/11/2021     INFLUENZA VACCINE (1) 09/01/2022     ANNUAL REVIEW OF HM ORDERS  03/31/2023     TSH W/FREE T4 REFLEX  03/31/2023     OZ ASSESSMENT  08/28/2023     PHQ-9  10/04/2023     MEDICARE ANNUAL WELLNESS VISIT  04/04/2024     FALL RISK ASSESSMENT  04/04/2024     ADVANCE CARE PLANNING  03/03/2026     LIPID  03/31/2027     COLORECTAL CANCER SCREENING  04/25/2032     HEPATITIS C SCREENING  Completed     DEPRESSION ACTION PLAN  Completed     Pneumococcal Vaccine: 65+ Years  Completed     ZOSTER IMMUNIZATION  Completed     IPV IMMUNIZATION  Aged Out     MENINGITIS IMMUNIZATION  Aged Out     MAMMO SCREENING  Discontinued     Lab work is in process  Mammogram Screening: Mammogram Screening: Recommended mammography every 1-2 years with patient discussion and risk factor consideration        Review of Systems   Constitutional: Negative for chills and fever.   HENT: Negative for congestion, ear pain, hearing loss and sore throat.    Eyes: Negative for pain and visual disturbance.   Respiratory: Positive for cough. Negative for shortness of breath.    Cardiovascular: Negative for chest pain, palpitations and peripheral edema.   Gastrointestinal: Negative for abdominal pain, constipation, diarrhea, heartburn, hematochezia and nausea.   Breasts:  Negative for tenderness, breast mass and discharge.   Genitourinary: Positive for frequency and urgency. Negative for dysuria, genital sores, hematuria, pelvic pain, vaginal bleeding and vaginal discharge.   Musculoskeletal: Negative for arthralgias, joint swelling and myalgias.   Neurological: Negative for dizziness, weakness, headaches and paresthesias.   Psychiatric/Behavioral: Negative for mood changes. The patient is nervous/anxious.        OBJECTIVE:   /84   Pulse 66   Temp 98.2  F (36.8  C) (Tympanic)   Ht 1.651  "m (5' 5\")   Wt 77.6 kg (171 lb)   SpO2 96%   BMI 28.46 kg/m   Estimated body mass index is 28.46 kg/m  as calculated from the following:    Height as of this encounter: 1.651 m (5' 5\").    Weight as of this encounter: 77.6 kg (171 lb).  Physical Exam  GENERAL APPEARANCE: healthy, alert and no distress  EYES: Eyes grossly normal to inspection, PERRL and conjunctivae and sclerae normal  HENT: ear canals and TM's normal, nose and mouth without ulcers or lesions, oropharynx clear and oral mucous membranes moist  NECK: no adenopathy, no asymmetry, masses, or scars and thyroid normal to palpation  RESP: lungs clear to auscultation - no rales, rhonchi or wheezes  BREAST: normal without masses, tenderness or nipple discharge and no palpable axillary masses or adenopathy  CV: regular rate and rhythm, normal S1 S2, no S3 or S4, no murmur, click or rub, no peripheral edema and peripheral pulses strong  ABDOMEN: soft, nontender, no hepatosplenomegaly, no masses and bowel sounds normal  MS: no musculoskeletal defects are noted and gait is age appropriate without ataxia  SKIN: no suspicious lesions or rashes  SKIN: no suspicious lesions or rashes and lipoma on left upper back 5 cm x 5 cm soft patient was unaware of this  NEURO: Normal strength and tone, sensory exam grossly normal, mentation intact and speech normal  PSYCH: mentation appears normal and affect normal/bright        ASSESSMENT / PLAN:   (Z00.00) Encounter for Medicare annual wellness exam  (primary encounter diagnosis)  Comment:   Plan:     (E03.4) Hypothyroidism due to acquired atrophy of thyroid  Comment:   Plan: TSH WITH FREE T4 REFLEX, Basic metabolic panel         (Ca, Cl, CO2, Creat, Gluc, K, Na, BUN)        Recheck    (E78.5) Hyperlipidemia LDL goal <100  Comment:   Plan: atorvastatin (LIPITOR) 10 MG tablet        Check lipids    (F33.41) Recurrent major depressive disorder, in partial remission (H)  Comment:   Plan: buPROPion (WELLBUTRIN SR) 100 MG 12 hr " tablet,         FLUoxetine (PROZAC) 20 MG capsule        Continue meds very stable    (E03.9) Acquired hypothyroidism  Comment:   Plan: levothyroxine (SYNTHROID/LEVOTHROID) 100 MCG         tablet       We will continue meds change if needed after labs    (N39.42) Urinary incontinence without sensory awareness  Comment: Discussed symptoms have they have increased over time she does not feel like she gets a warning in the urine just comes out.  Check UA  Plan: Physical Therapy Referral, UA Macro with Reflex        to Micro and Culture - lab collect, Basic         metabolic panel  (Ca, Cl, CO2, Creat, Gluc, K,         Na, BUN)        Patient has increasing incontinence we will check a UA and also will have her go to pelvic floor therapy    (Z78.0) Postmenopausal status  Comment:     Plan: DEXA HIP/PELVIS/SPINE - Future          She is at increased risk of osteoporosis due to her hypothyroidism.  She will schedule this  No results found for any visits on 04/04/23.              COUNSELING:  Reviewed preventive health counseling, as reflected in patient instructions        She reports that she has never smoked. She has never used smokeless tobacco.      Appropriate preventive services were discussed with this patient, including applicable screening as appropriate for cardiovascular disease, diabetes, osteopenia/osteoporosis, and glaucoma.  As appropriate for age/gender, discussed screening for colorectal cancer, prostate cancer, breast cancer, and cervical cancer. Checklist reviewing preventive services available has been given to the patient.    Reviewed patients plan of care and provided an AVS. The Basic Care Plan (routine screening as documented in Health Maintenance) for Ellie meets the Care Plan requirement. This Care Plan has been established and reviewed with the Patient.          Rima Agarwal MD  [facility]    Identified Health Risks:    I have reviewed Opioid Use Disorder and Substance  Use Disorder risk factors and made any needed referrals.       The patient s PHQ-9 score is consistent with moderate depression. She was provided with information regarding depression and was advised to schedule a follow up appointment in      weeks to further address this issue.  Answers for HPI/ROS submitted by the patient on 4/3/2023  If you checked off any problems, how difficult have these problems made it for you to do your work, take care of things at home, or get along with other people?: Somewhat difficult  PHQ9 TOTAL SCORE: 13      Stable     Rima Agarwal M.D.

## 2023-04-04 NOTE — PATIENT INSTRUCTIONS
"  Patient Education   Personalized Prevention Plan  You are due for the preventive services outlined below.  Your care team is available to assist you in scheduling these services.  If you have already completed any of these items, please share that information with your care team to update in your medical record.  Health Maintenance Due   Topic Date Due     Osteoporosis Screening  Never done     Diptheria Tetanus Pertussis (DTAP/TDAP/TD) Vaccine (1 - Tdap) 11/22/2017     COVID-19 Vaccine (3 - Booster for Pfizer series) 07/11/2021     Flu Vaccine (1) 09/01/2022     ANNUAL REVIEW OF HM ORDERS  03/31/2023     Thyroid Function Lab  03/31/2023       Depression and Suicide in Older Adults  Nearly 2 million older adults in the [place] have some type of depression. Some of them even take their own lives. Yet depression among older adults is often ignored. Learning about the warning signs of depression may help spare a loved one needless pain. You may also save a life.   What is depression?  Depression is a common and serious illness. It affects the way you think and feel. It is not a normal part of aging. It is not a sign of weakness, a character flaw, or something you can \"snap out of.\" Most people with depression need treatment to get better. The most common symptom is a feeling of deep sadness. People who are depressed also may seem tired and listless. And nothing seems to give them pleasure. It s normal to grieve or be sad sometimes. But sadness lessens or passes with time. Depression rarely goes away or improves on its own. Other symptoms of depression are:     Sleeping more or less than normal    Eating more or less than normal    Having headaches, stomachaches, or other pains that don t go away    Feeling nervous,  empty,  or worthless    Crying a lot    Thinking or talking about suicide or death    Loss of interest in activities previously enjoyed    Social isolation    Feeling confused or forgetful  What causes " it?  The causes of depression aren t fully known. But it is thought to result from a complex blend of these factors:     Biochemistry. Certain chemicals in the brain play a role.    Genes. Depression does run in families.    Life stress. Life stresses can also trigger depression in some people. Older adults often face many stressors. These may include isolation, the death of friends or a spouse, health problems, and financial concerns.    Chronic health conditions. This includes diabetes, heart disease, or cancer. These can cause symptoms of depression. Medicine side effects can cause changes in thoughts and behaviors.  Giving support    Depressed people often may not want to ask for help. When they do, they may be ignored. Or they may get the wrong treatment. You can help by showing parents and older friends love and support. If they seem depressed, don t lecture the person or ignore the symptoms. Don't discount the symptoms as a  normal  part of aging. They are not. Get involved, listen, and show interest and support.   Help them understand that depression is a treatable illness. Tell them you can help them find the right treatment. Offer to go to their healthcare provider's appointment with them for support when the symptoms are discussed. With their approval, contact a local mental health center, social service agency, or hospital about services.   Helping at healthcare visits  You can be an advocate for them at healthcare appointments. Many older adults have chronic illnesses. Many of these can cause symptoms of depression. Medicine side effects can change thoughts and behaviors.   You can help make sure that the healthcare provider looks at all of these factors. They should refer your family member or friend to a mental healthcare provider when needed. In some cases, untreated depression can lead to a misdiagnosis. A person may be diagnosed with a brain disorder such as dementia. If the healthcare provider  does not take the issue of depression seriously, help your family member or friend find another provider.   Asking about self-harm thoughts  If you think an older person you care about could be suicidal, ask,  Have you thought about suicide?  Most people will tell you the truth. If they say yes, they may already have a plan for how and when they will attempt it. Find out as much as you can. The more detailed the plan, and the easier it is to carry out, the more danger the person is in right now. Tell the person you are there for them and you do not want them to get harmed. Don't wait to get help for the person. Call the person's healthcare provider, local hospital, or emergency services.   Call 988 in a crisis   Never leave the person alone. A person who is actively suicidal needs crisis care right away. They need constant supervision. Never leave the person out of sight. Call 988 Tell the crisis counselor you need help for a person who is thinking about suicide. The counselor will help you get the right level of crisis help. You may be advised to take the person to the nearest emergency room.   The National Suicide Prevention Lifeline is available at 988, or 800-273-talk (987.519.7918), or www.suicidepreventionlifeline.org. When you call or text 988, you will be connected to trained counselors who are part of the National Suicide Prevention Lifeline network. An online chat option is also available. Lifeline is free and available 24/7.   To learn more    National Suicide Prevention Lifeline at www.suicidepreventionlifeline.org  or 405-686-UDCW (250-902-0177)    National [place] on Mental Illness at www.gabrielle.org  or 428-663-KBAG (464-071-5311)    Mental Health [place] at www.nmha.org  or 410-857-5577    National [place] of Mental Health at www.nimh.nih.gov  or 894-062-2150    Adrian last reviewed this educational content on 7/1/2022 2000-2022 [employer]. All rights reserved. This information is  not intended as a substitute for professional medical care. Always follow your healthcare professional's instructions.

## 2023-04-11 NOTE — RESULT ENCOUNTER NOTE
Ellie,  Your lab results were normal/stable. Please feel free to my chart or call the office with questions. iRma Agarwal M.D.

## 2023-04-25 ENCOUNTER — THERAPY VISIT (OUTPATIENT)
Dept: PHYSICAL THERAPY | Facility: CLINIC | Age: 73
End: 2023-04-25
Attending: FAMILY MEDICINE
Payer: COMMERCIAL

## 2023-04-25 DIAGNOSIS — R32 URINARY INCONTINENCE: ICD-10-CM

## 2023-04-25 DIAGNOSIS — N39.42 URINARY INCONTINENCE WITHOUT SENSORY AWARENESS: ICD-10-CM

## 2023-04-25 DIAGNOSIS — R27.8 MUSCULAR INCOORDINATION: ICD-10-CM

## 2023-04-25 PROCEDURE — 97161 PT EVAL LOW COMPLEX 20 MIN: CPT | Mod: GP | Performed by: PHYSICAL THERAPIST

## 2023-04-25 PROCEDURE — 97535 SELF CARE MNGMENT TRAINING: CPT | Mod: GP | Performed by: PHYSICAL THERAPIST

## 2023-04-25 PROCEDURE — 97110 THERAPEUTIC EXERCISES: CPT | Mod: GP | Performed by: PHYSICAL THERAPIST

## 2023-04-25 NOTE — PROGRESS NOTES
Physical Therapy Initial Evaluation  Subjective:  The history is provided by the patient and medical records. No  was used.   Patient Health History  Ellie Lewis being seen for pelvic evaluation.     Problem began: 4/4/2023 (MD order).   Problem occurred: over time   Pain is reported as 3/10 on pain scale.  General health as reported by patient is fair.  Pertinent medical history includes: changes in bowel/bladder.   Red flags:  None as reported by patient.  Medical allergies: other. Other medical allergies details: Sulfa.   Surgeries include:  Orthopedic surgery and other. Other surgery history details: hysterectomy.    Current medications:  Anti-depressants and thyroid medication.    Current occupation is retired.   Primary job tasks include:  Driving and repetitive tasks.                  Therapist Generated HPI Evaluation  Problem details: Months of urinary incontinence, gradually worsening. Notes especially with getting up out the car after driving long distances, or sitting for long periods of time feels as though has no control.  History B NATASHA, chronic LBP.         Type of problem:  Incontinence.    This is a chronic condition.  Condition occurred with:  Insidious onset.  Where condition occurred: for unknown reasons.                           SUBJECTIVE:    Urination:  Do you leak on the way to the bathroom or with a strong urge to void? Yes   Do you leak with cough,sneeze, jumping, running?sometimes   Any other activities that cause leaking? Yes - getting up from seated   Do you have triggers that make you feel you can't wait to go to the bathroom? Yes what are they: unlocking the door.  Type of pad and number used per day? High absorbency pad, 2-3  When you leak what is the amount? large  How long can you delay the need to urinate? Not at all.   How many times do you get up to urinate at night? 2-3   Can you stop the flow of urine when on the toilet? Not attempted  Is the volume  of urine passed usually: average. (8sec rule= 250ml with average bladder storing 400-600ml)  Do you feel empty when you are done? mostly  Do you strain to pass urine? No  Do you have a slow or hesitant urinary stream? No  Do you have difficulty initiating the urine stream? No  Is urination painful? No  How many bladder infections have you had in last 12 months? 0  Fluid intake(one glass is 8oz or one cup) 3-4 glasses/day, 3 caffinated glasses/day  0 alcohol glasses/day.    Bowel habits:  Frequency of bowel movements? 5-7 times a week  Consistancy of stool? soft formed  Do you ignore the urge to defecate? Not asked  Do you strain to pass stool? Not asked    Aggrevating factors:  Is loss of stool associated with an activity (lifting, coughing, running) or a food)  No  Are there any foods that increase or decrease your symptoms?  Not asked  Do you have any food allergies?  Not asked          Do you have abdominal pain?  No - arthritis in lower back  Describe the quality of the pain: na  What makes your abdominal pain worse? na  What makes your abdominal pain better? na  Do you ever have pain that wakes you at night? na    Do you have rectal pain, pressure, or burning?  Not asked     Pelvic Pain:  Do you have any pelvic pain with intercourse, exams, use of tampons? No  Is initial penetration during intercourse painful? Not asked  Is deeper penetration painful? Not asked  Do you use lubricant? Not asked What kind? na  ?  Given birth? Yes Any complications? 1 miscarriage  # of vaginal delieveries? 3  # of C-sections? 0  # of episiotomies? 1.  Are you sexually active?No  Have you ever been worried for your physical safety? No  Do you have any depression, anxiety, panic attacks, excessive stress?  Occasionally stress  Any abdominal or pelvic surgeries? Hysterectomy, bladder surgery as a child. (chart notes genitourinary surgery 15 years ago but pt unsure what this was)   Are you having any regular exercise? no  Have you  practiced the PF(kegel) exercises for 4 or more weeks? Yes has tried  Thyroid checked? Yes (related to hair loss, flu-like symptoms, wt gain/loss, fatigue, menopause)  Changed diet lately? Less red meat.    OBSERVATION  Lumbar Posture: Negative  Pelvic symmetry: Negative  Introitus: unremarkable  Trendelenberg Sign:Negative    ROM  Single leg standing unilateral hip flexion PSIS:Negative  Standing forward flexion PSIS:Negative  Passive Hip ROM:Negative (BTHA)  SHIRA:Not Tested:  SHIRA with OP:Not Tested:     MUSCLE PERFORMANCE  Active SLR:Negative  Baseline PF tone:compensation strategies - glutes, adductors - improves w/ education  PF Tone with cough: bulge  Valsalva: bulge  PF Response quality: normal  PF Power: Center: 2 Stronger on right/left na.  Endurance: Maximum contraction in seconds: 6  # of endurance contractions before fatigue: 4  Quick contraction repetitions prior to fatigue: 8.  Specificity/accessory muscles: WNL/WFL, Synergy with abdominals: WNL/WFL.  Prolapse: Cyctocele/Rectocele/Uterine stgstrstastdstest:st st1st PALPATION: Pain: none                  Objective:  System                                 Pelvic Dysfunction Evaluation:        Flexibility:    Tightness present at:Hamstrings      Pelvic Clock Exam:    Ischiocavernosis pain:  -  Bulbocavernosis pain:  -  Transverse Perineal:  -  Levator ANI:  -  Perineal Body:  -      External Assessment:    Skin Condition:  Normal  Scars:  Episiotomy midline and well healed  Bearing Down/Coughing:  Normal      Muscle Contraction/Perineal Mobility:  Elevation and urogential triangle descent  Internal Assessment:      Contraction/Grade:  Weak squeeze, 2 second hold (2)                               General     ROS    Assessment/Plan:    Patient is a 73 year old female with pelvic complaints.    Patient has the following significant findings with corresponding treatment plan.                Diagnosis 1:  Urinary incontinence  Decreased strength - therapeutic exercise  and therapeutic activities  Impaired muscle performance - biofeedback and neuro re-education  Decreased function - therapeutic activities and self care/education    Therapy Evaluation Codes:   1) History comprised of:   Personal factors that impact the plan of care:      Time since onset of symptoms.    Comorbidity factors that impact the plan of care are:      Bowel/bladder changes.     Medications impacting care: Anti-depressant.  2) Examination of Body Systems comprised of:   Body structures and functions that impact the plan of care:      Pelvis.   Activity limitations that impact the plan of care are:      Frequency, Stress incontinence, Urgency, Urge incontinence and Urinary incontinence.  3) Clinical presentation characteristics are:   Stable/Uncomplicated.  4) Decision-Making    Low complexity using standardized patient assessment instrument and/or measureable assessment of functional outcome.  Cumulative Therapy Evaluation is: Low complexity.    Previous and current functional limitations:  (See Goal Flow Sheet for this information)    Short term and Long term goals: (See Goal Flow Sheet for this information)     Communication ability:  Patient appears to be able to clearly communicate and understand verbal and written communication and follow directions correctly.  Treatment Explanation - The following has been discussed with the patient:   RX ordered/plan of care  Anticipated outcomes  Possible risks and side effects  This patient would benefit from PT intervention to resume normal activities.   Rehab potential is good.    Frequency:  1 X week, once daily  Duration:  for 6 weeks, decreasing to 1x/2 weeks for 6 weeks  Discharge Plan:  Achieve all LTG.  Independent in home treatment program.  Reach maximal therapeutic benefit.    Please refer to the daily flowsheet for treatment today, total treatment time and time spent performing 1:1 timed codes.

## 2023-04-25 NOTE — PROGRESS NOTES
[facility]    OUTPATIENT Physical Therapy ORTHOPEDIC EVALUATION  PLAN OF TREATMENT FOR OUTPATIENT REHABILITATION  (COMPLETE FOR INITIAL CLAIMS ONLY)  Patient's Last Name, First Name, M.I.  YOB: 1950  Ellie Lewis    Provider s Name:  [facility]   Medical Record No.  [id number]   Start of Care Date:  04/25/23   Onset Date:   04/04/23 (MD order)   Treatment Diagnosis:  Urinary incontinence Medical Diagnosis:     Urinary incontinence without sensory awareness  Urinary incontinence  Muscular incoordination       Goals:     04/25/23 0700   Urinary Leakage   Previous Functional Level No problems   Current Functional Level Leakage with cough or sneeze;Leaking with urge;Leakage with sit to stand   Performance Level greatly   STG Target Performance Decrease leakage with   Performance Level sit to stand to moderate   Rationale continence throughout the day;continence throughout the night;for healthy hygiene and to prevent skin breakdown   Due Date 05/16/23   LTG Target Performance Decrease leakage with   Performance Level sit to stand to minimal   Rationale continence throughout the day;continence throughout the night;for healthy hygiene and to prevent skin breakdown   Due Date 07/18/23         Therapy Frequency:  1x/week  Predicted Duration of Therapy Intervention:  for 6 weeks, decreasing to 1x/2 weeks for 6 weeks    Leydi Davis, PT                 I CERTIFY THE NEED FOR THESE SERVICES FURNISHED UNDER        THIS PLAN OF TREATMENT AND WHILE UNDER MY CARE     (Physician co-signature of this document indicates review and certification of the therapy plan).                     Certification Date From:  04/25/23   Certification Date To:  07/18/23    Referring Provider:  Rima Agarwal    Initial Assessment        See Epic Evaluation SOC Date: 04/25/23

## 2023-05-02 ENCOUNTER — THERAPY VISIT (OUTPATIENT)
Dept: PHYSICAL THERAPY | Facility: CLINIC | Age: 73
End: 2023-05-02
Payer: COMMERCIAL

## 2023-05-02 DIAGNOSIS — R27.8 MUSCULAR INCOORDINATION: ICD-10-CM

## 2023-05-02 DIAGNOSIS — R32 URINARY INCONTINENCE: Primary | ICD-10-CM

## 2023-05-02 PROCEDURE — 97110 THERAPEUTIC EXERCISES: CPT | Mod: GP | Performed by: PHYSICAL THERAPIST

## 2023-05-02 PROCEDURE — 97112 NEUROMUSCULAR REEDUCATION: CPT | Mod: GP | Performed by: PHYSICAL THERAPIST

## 2023-05-02 PROCEDURE — 97535 SELF CARE MNGMENT TRAINING: CPT | Mod: GP | Performed by: PHYSICAL THERAPIST

## 2023-05-09 ENCOUNTER — THERAPY VISIT (OUTPATIENT)
Dept: PHYSICAL THERAPY | Facility: CLINIC | Age: 73
End: 2023-05-09
Attending: FAMILY MEDICINE
Payer: COMMERCIAL

## 2023-05-09 DIAGNOSIS — R32 URINARY INCONTINENCE: Primary | ICD-10-CM

## 2023-05-09 DIAGNOSIS — R27.8 MUSCULAR INCOORDINATION: ICD-10-CM

## 2023-05-09 PROCEDURE — 97535 SELF CARE MNGMENT TRAINING: CPT | Mod: GP | Performed by: PHYSICAL THERAPIST

## 2023-05-09 PROCEDURE — 97110 THERAPEUTIC EXERCISES: CPT | Mod: GP | Performed by: PHYSICAL THERAPIST

## 2023-06-01 ENCOUNTER — THERAPY VISIT (OUTPATIENT)
Dept: PHYSICAL THERAPY | Facility: CLINIC | Age: 73
End: 2023-06-01
Payer: COMMERCIAL

## 2023-06-01 DIAGNOSIS — R32 URINARY INCONTINENCE: Primary | ICD-10-CM

## 2023-06-01 DIAGNOSIS — R27.8 MUSCULAR INCOORDINATION: ICD-10-CM

## 2023-06-01 PROCEDURE — 97535 SELF CARE MNGMENT TRAINING: CPT | Mod: GP | Performed by: PHYSICAL THERAPIST

## 2023-06-01 PROCEDURE — 97112 NEUROMUSCULAR REEDUCATION: CPT | Mod: GP | Performed by: PHYSICAL THERAPIST

## 2023-07-26 DIAGNOSIS — F33.41 RECURRENT MAJOR DEPRESSIVE DISORDER, IN PARTIAL REMISSION (H): ICD-10-CM

## 2023-07-26 RX ORDER — BUPROPION HYDROCHLORIDE 100 MG/1
TABLET, EXTENDED RELEASE ORAL
Qty: 180 TABLET | Refills: 2 | Status: SHIPPED | OUTPATIENT
Start: 2023-07-26 | End: 2024-04-18

## 2023-07-26 NOTE — TELEPHONE ENCOUNTER
"Routing refill request to provider for review/approval because:  PHQ9: 13 on 4/2023    Requested Prescriptions   Pending Prescriptions Disp Refills    buPROPion (WELLBUTRIN SR) 100 MG 12 hr tablet [Pharmacy Med Name: buPROPion HCl ER (SR) 100 MG Oral Tablet Extended Release 12 Hour] 180 tablet 3     Sig: TAKE 1 TABLET BY MOUTH TWICE  DAILY       SSRIs Protocol Failed - 7/26/2023  5:43 AM        Failed - PHQ-9 score less than 5 in past 6 months     Please review last PHQ-9 score.           Passed - Medication is Bupropion     If the medication is Bupropion (Wellbutrin), and the patient is taking for smoking cessation; OK to refill.          Passed - Medication is active on med list        Passed - Patient is age 18 or older        Passed - No active pregnancy on record        Passed - No positive pregnancy test in last 12 months        Passed - Recent (6 mo) or future (30 days) visit within the authorizing provider's specialty     Patient had office visit in the last 6 months or has a visit in the next 30 days with authorizing provider or within the authorizing provider's specialty.  See \"Patient Info\" tab in inbasket, or \"Choose Columns\" in Meds & Orders section of the refill encounter.                     Kevin Cole RN 07/26/23 7:31 AM   "

## 2023-08-09 ENCOUNTER — OFFICE VISIT (OUTPATIENT)
Dept: UROLOGY | Facility: CLINIC | Age: 73
End: 2023-08-09
Attending: OBSTETRICS & GYNECOLOGY
Payer: COMMERCIAL

## 2023-08-09 VITALS
SYSTOLIC BLOOD PRESSURE: 127 MMHG | BODY MASS INDEX: 28.71 KG/M2 | HEART RATE: 60 BPM | DIASTOLIC BLOOD PRESSURE: 79 MMHG | WEIGHT: 172.5 LBS

## 2023-08-09 DIAGNOSIS — N95.2 VAGINAL ATROPHY: ICD-10-CM

## 2023-08-09 DIAGNOSIS — N39.46 MIXED INCONTINENCE: Primary | ICD-10-CM

## 2023-08-09 PROCEDURE — 99204 OFFICE O/P NEW MOD 45 MIN: CPT | Performed by: OBSTETRICS & GYNECOLOGY

## 2023-08-09 PROCEDURE — G0463 HOSPITAL OUTPT CLINIC VISIT: HCPCS | Performed by: OBSTETRICS & GYNECOLOGY

## 2023-08-09 RX ORDER — ESTRADIOL 0.1 MG/G
1 CREAM VAGINAL
Qty: 42.5 G | Refills: 3 | Status: SHIPPED | OUTPATIENT
Start: 2023-08-09 | End: 2024-04-18

## 2023-08-09 ASSESSMENT — PAIN SCALES - GENERAL: PAINLEVEL: NO PAIN (0)

## 2023-08-09 NOTE — PROGRESS NOTES
"2023    Referring Provider: No referring provider defined for this encounter.    Primary Care Provider: Rima Agarwal    CC: urinary incontinence    HPI:  Ellie Lewis is a 74 yo  who presents for evaluation of urgency predominant mixed incontinence.   She has had this problem for many years but says it has gotten really worse over the last few years. .      Urinary Symptoms/Voiding function  Leaks primarily with urgency or with standing from a sitting position. Large leaks. Occasionally with coughing, laughing etc.Wears 3 or so mod size pads a day. Voids about 3-4 times during the day and 2-3 times at night. Denies dysuria, gross hematuria, or rUTIs. No hx of kidney stones.     Drinks about 3 glasses of water, two 10oz cups of coffee and two 10oz of black tea a day    Has tried pelvic PT a couple of months ago and says it only improved her symptom slightly but she is no longer doing the exercises. Says she is not motivated and doesn't remember. Was put on some \"bladder medication\" but stopped due to dry mouth side effects.  Has a hx of hysterectomy (?laparascopic) and mesh bladder suspension ( denies incontinence or prolapse symptoms at that time) in her early 50s for fibroids.     Pelvic Organ Prolapse Symptoms  Denies vaginal bulge, pressure sensation or protrusion.    Gastrointestinal Symptoms:  Denies chronic diarrhea, constipation. Denies bothersome fecal or flatal incontinence. Denies defecatory difficulties.       Sexual function/Pelvic floor pain/GYN:   Not sexually active. Denies any pelvic pain      Relevant Medical History:    Diabetes? no  High Blood pressure? no     Recurrent UTIs? n  Sleep Apnea? no  Obesity? Body mass index is 28.71 kg/m .  History of Blood clots? no  Other medical problems: hypothyroidism    Surgical History:      Past Surgical History:   Procedure Laterality Date    BIOPSY      COLONOSCOPY  5 years ago    COLONOSCOPY N/A 2022    Procedure: COLONOSCOPY;  " Surgeon: Tommy Ayala MD;  Location: [facility]    ESOPHAGOSCOPY, GASTROSCOPY, DUODENOSCOPY (EGD), COMBINED N/A 2022    Procedure: ESOPHAGOGASTRODUODENOSCOPY, WITH BIOPSY;  Surgeon: Tommy Ayala MD;  Location: [facility]    EYE SURGERY  15 years ago    Cataracts    GENITOURINARY SURGERY  15 years ago    GYN SURGERY      Hysterectomy    left hip total arthroplasty Left 2019    ORTHOPEDIC SURGERY  2020    hip replacement       OB/Gyn History:  OB History    Para Term  AB Living   4 0 0 0 1 3   SAB IAB Ectopic Multiple Live Births   1 0 0 0 0      # Outcome Date GA Lbr Melchor/2nd Weight Sex Delivery Anes PTL Lv   4             3             2             1 SAB                Medications/Vitamins/Supplements:   Current Outpatient Medications   Medication    buPROPion (WELLBUTRIN SR) 100 MG 12 hr tablet    FLUoxetine (PROZAC) 20 MG capsule    levothyroxine (SYNTHROID/LEVOTHROID) 100 MCG tablet    amoxicillin (AMOXIL) 500 MG tablet     Current Facility-Administered Medications   Medication    ropivacaine (NAROPIN) injection 3 mL    triamcinolone (KENALOG-40) injection 40 mg         Medical History:      Past Medical History:   Diagnosis Date    Arthritis     Depressive disorder 35 years ago    Thyroid disease 25 years ago     ROS  Social History    Social History     Socioeconomic History    Marital status:      Spouse name: Not on file    Number of children: Not on file    Years of education: Not on file    Highest education level: Not on file   Occupational History    Not on file   Tobacco Use    Smoking status: Never    Smokeless tobacco: Never   Vaping Use    Vaping Use: Never used   Substance and Sexual Activity    Alcohol use: No    Drug use: No    Sexual activity: Not Currently     Partners: Male     Birth control/protection: Female Surgical     Comment: hysterectomy   Other Topics Concern    Parent/sibling w/ CABG, MI or angioplasty before 65F 55M?  No   Social History Narrative    Not on file     Social Determinants of Health     Financial Resource Strain: Not on file   Food Insecurity: Not on file   Transportation Needs: Not on file   Physical Activity: Not on file   Stress: Not on file   Social Connections: Not on file   Intimate Partner Violence: Not on file   Housing Stability: Not on file       Family History  Family History   Problem Relation Age of Onset    Heart Murmur Mother     Hypertension Mother     Hyperlipidemia Mother     Anxiety Disorder Mother     Alzheimer Disease Father     Other Cancer Father         Skin    Asthma Father     Hyperlipidemia Brother     Mental Illness Brother         OCD    Other Cancer Maternal Grandmother         Bone    Depression Other     Depression Cousin     Anxiety Disorder Son     Mental Illness Other         OCD    Mental Illness Other         OCD    Substance Abuse Daughter         Vaping use of Marijuana       Allergy    Allergies   Allergen Reactions    Sulfa Antibiotics Rash       Current Outpatient Medications   Medication    buPROPion (WELLBUTRIN SR) 100 MG 12 hr tablet    FLUoxetine (PROZAC) 20 MG capsule    levothyroxine (SYNTHROID/LEVOTHROID) 100 MCG tablet    amoxicillin (AMOXIL) 500 MG tablet     Current Facility-Administered Medications   Medication    ropivacaine (NAROPIN) injection 3 mL    triamcinolone (KENALOG-40) injection 40 mg       Physical Exam:   /79   Pulse 60   Wt 78.2 kg (172 lb 8 oz)   BMI 28.71 kg/m   No LMP recorded. Patient is postmenopausal. Body mass index is 28.71 kg/m .    Gen:  is alert, comfortable in no acute distress,  Abdomen: Abdomen is soft, non-tender, non-distended,   Lungs: non-labored breathing.     Pelvic Exam:   Normal external female genitalia. The urethra was normal.    Vagina: mod to severe atrophy, normal support, no abn discharge  Uterus: surgically absent  Ovaries: no palpable mass  Vulva: no lesions, no pain  Rectal: deferred    Pelvic floor strength:  2/5 kegels.    Pelvic floor muscles: no myalgia, normal tone    POPQ EXAM FOR PROLAPSE SEVERITY    No prolapse     Voiding trial:    VOID not measured  PVR 66 mL by Bladder ultrasound  Leak with Cough stress test : no, Prolapse reduced : no need    Labs:   Color Urine (no units)   Date Value   04/04/2023 Yellow     Appearance Urine (no units)   Date Value   04/04/2023 Clear     Glucose Urine (mg/dL)   Date Value   04/04/2023 Negative     Bilirubin Urine (no units)   Date Value   04/04/2023 Negative     Ketones Urine (mg/dL)   Date Value   04/04/2023 Negative     Specific Gravity Urine (no units)   Date Value   04/04/2023 >=1.030     pH Urine (no units)   Date Value   04/04/2023 5.5     Protein Albumin Urine (mg/dL)   Date Value   04/04/2023 Negative     Urobilinogen Urine (E.U./dL)   Date Value   04/04/2023 0.2     Nitrite Urine (no units)   Date Value   04/04/2023 Negative     Leukocyte Esterase Urine (no units)   Date Value   04/04/2023 Small (A)     CBC RESULTS:   Recent Labs   Lab Test 09/30/21  1523   WBC 6.7   RBC 4.43   HGB 14.1   HCT 41.8   MCV 94   MCH 31.8   MCHC 33.7   RDW 12.7            A/P: Ellie Lewis is a 73 year old F with   Ellie was seen today for consult.    Diagnoses and all orders for this visit:    Mixed incontinence    Vaginal atrophy  -     estradiol (ESTRACE) 0.1 MG/GM vaginal cream; Place 1 g vaginally three times a week Ok to use your fingers or the applicator        Today we discussed the following    Bladder retraining   Reducing caffeinated beverages to no more than 1 cup a day  Kegel exercises twice a day ( pair it with her medication intake so she can remember)  Reduce evening fluids to improve night time symptoms  Follow up with me in 3 months virtually. If not improving, then we will consider other options (medications/procedures etc)       I spent a total of 45 minutes with  Ellie Lewis  on the date of the encounter in chart review, face to face patient visit,  review of tests, documentation and/or discussion with other providers about the issues documented above.     Gali Christianson MD, [facility]  , Department of OBGYN  Female Pelvic Medicine and Reconstructive Surgery ( Urogynecology)  CC  Patient Care Team:  Rima Agarwal MD as PCP - General (Family Practice)  Rima Agarwal MD as Assigned PCP  Gali Christianson MD as MD (OB/Gyn)

## 2023-08-09 NOTE — LETTER
"2023       RE: Ellie Lewis  [address]  [facility] [address]     Dear Colleague,    Thank you for referring your patient, Ellie Lewis, to the [facility] [place] at [facility]. Please see a copy of my visit note below.    2023    Referring Provider: No referring provider defined for this encounter.    Primary Care Provider: Rima Agarwal    CC: urinary incontinence    HPI:  Ellie Lewis is a 74 yo  who presents for evaluation of urgency predominant mixed incontinence.   She has had this problem for many years but says it has gotten really worse over the last few years. .      Urinary Symptoms/Voiding function  Leaks primarily with urgency or with standing from a sitting position. Large leaks. Occasionally with coughing, laughing etc.Wears 3 or so mod size pads a day. Voids about 3-4 times during the day and 2-3 times at night. Denies dysuria, gross hematuria, or rUTIs. No hx of kidney stones.     Drinks about 3 glasses of water, two 10oz cups of coffee and two 10oz of black tea a day    Has tried pelvic PT a couple of months ago and says it only improved her symptom slightly but she is no longer doing the exercises. Says she is not motivated and doesn't remember. Was put on some \"bladder medication\" but stopped due to dry mouth side effects.  Has a hx of hysterectomy (?laparascopic) and mesh bladder suspension ( denies incontinence or prolapse symptoms at that time) in her early 50s for fibroids.     Pelvic Organ Prolapse Symptoms  Denies vaginal bulge, pressure sensation or protrusion.    Gastrointestinal Symptoms:  Denies chronic diarrhea, constipation. Denies bothersome fecal or flatal incontinence. Denies defecatory difficulties.       Sexual function/Pelvic floor pain/GYN:   Not sexually active. Denies any pelvic pain      Relevant Medical History:    Diabetes? no  High Blood pressure? no   "   Recurrent UTIs? n  Sleep Apnea? no  Obesity? Body mass index is 28.71 kg/m .  History of Blood clots? no  Other medical problems: hypothyroidism    Surgical History:      Past Surgical History:   Procedure Laterality Date    BIOPSY      COLONOSCOPY  5 years ago    COLONOSCOPY N/A 2022    Procedure: COLONOSCOPY;  Surgeon: Tommy Ayala MD;  Location: [facility]    ESOPHAGOSCOPY, GASTROSCOPY, DUODENOSCOPY (EGD), COMBINED N/A 2022    Procedure: ESOPHAGOGASTRODUODENOSCOPY, WITH BIOPSY;  Surgeon: Tommy Ayala MD;  Location: [facility]    EYE SURGERY  15 years ago    Cataracts    GENITOURINARY SURGERY  15 years ago    GYN SURGERY      Hysterectomy    left hip total arthroplasty Left 2019    ORTHOPEDIC SURGERY  2020    hip replacement       OB/Gyn History:  OB History    Para Term  AB Living   4 0 0 0 1 3   SAB IAB Ectopic Multiple Live Births   1 0 0 0 0      # Outcome Date GA Lbr Melchor/2nd Weight Sex Delivery Anes PTL Lv   4             3             2             1 SAB                Medications/Vitamins/Supplements:   Current Outpatient Medications   Medication    buPROPion (WELLBUTRIN SR) 100 MG 12 hr tablet    FLUoxetine (PROZAC) 20 MG capsule    levothyroxine (SYNTHROID/LEVOTHROID) 100 MCG tablet    amoxicillin (AMOXIL) 500 MG tablet     Current Facility-Administered Medications   Medication    ropivacaine (NAROPIN) injection 3 mL    triamcinolone (KENALOG-40) injection 40 mg         Medical History:      Past Medical History:   Diagnosis Date    Arthritis     Depressive disorder 35 years ago    Thyroid disease 25 years ago     ROS  Social History    Social History     Socioeconomic History    Marital status:      Spouse name: Not on file    Number of children: Not on file    Years of education: Not on file    Highest education level: Not on file   Occupational History    Not on file   Tobacco Use    Smoking status: Never    Smokeless tobacco:  Never   Vaping Use    Vaping Use: Never used   Substance and Sexual Activity    Alcohol use: No    Drug use: No    Sexual activity: Not Currently     Partners: Male     Birth control/protection: Female Surgical     Comment: hysterectomy   Other Topics Concern    Parent/sibling w/ CABG, MI or angioplasty before 65F 55M? No   Social History Narrative    Not on file     Social Determinants of Health     Financial Resource Strain: Not on file   Food Insecurity: Not on file   Transportation Needs: Not on file   Physical Activity: Not on file   Stress: Not on file   Social Connections: Not on file   Intimate Partner Violence: Not on file   Housing Stability: Not on file       Family History  Family History   Problem Relation Age of Onset    Heart Murmur Mother     Hypertension Mother     Hyperlipidemia Mother     Anxiety Disorder Mother     Alzheimer Disease Father     Other Cancer Father         Skin    Asthma Father     Hyperlipidemia Brother     Mental Illness Brother         OCD    Other Cancer Maternal Grandmother         Bone    Depression Other     Depression Cousin     Anxiety Disorder Son     Mental Illness Other         OCD    Mental Illness Other         OCD    Substance Abuse Daughter         Vaping use of Marijuana       Allergy    Allergies   Allergen Reactions    Sulfa Antibiotics Rash       Current Outpatient Medications   Medication    buPROPion (WELLBUTRIN SR) 100 MG 12 hr tablet    FLUoxetine (PROZAC) 20 MG capsule    levothyroxine (SYNTHROID/LEVOTHROID) 100 MCG tablet    amoxicillin (AMOXIL) 500 MG tablet     Current Facility-Administered Medications   Medication    ropivacaine (NAROPIN) injection 3 mL    triamcinolone (KENALOG-40) injection 40 mg       Physical Exam:   /79   Pulse 60   Wt 78.2 kg (172 lb 8 oz)   BMI 28.71 kg/m   No LMP recorded. Patient is postmenopausal. Body mass index is 28.71 kg/m .    Gen:  is alert, comfortable in no acute distress,  Abdomen: Abdomen is soft,  non-tender, non-distended,   Lungs: non-labored breathing.     Pelvic Exam:   Normal external female genitalia. The urethra was normal.    Vagina: mod to severe atrophy, normal support, no abn discharge  Uterus: surgically absent  Ovaries: no palpable mass  Vulva: no lesions, no pain  Rectal: deferred    Pelvic floor strength: 2/5 kegels.    Pelvic floor muscles: no myalgia, normal tone    POPQ EXAM FOR PROLAPSE SEVERITY    No prolapse     Voiding trial:    VOID not measured  PVR 66 mL by Bladder ultrasound  Leak with Cough stress test : no, Prolapse reduced : no need    Labs:   Color Urine (no units)   Date Value   04/04/2023 Yellow     Appearance Urine (no units)   Date Value   04/04/2023 Clear     Glucose Urine (mg/dL)   Date Value   04/04/2023 Negative     Bilirubin Urine (no units)   Date Value   04/04/2023 Negative     Ketones Urine (mg/dL)   Date Value   04/04/2023 Negative     Specific Gravity Urine (no units)   Date Value   04/04/2023 >=1.030     pH Urine (no units)   Date Value   04/04/2023 5.5     Protein Albumin Urine (mg/dL)   Date Value   04/04/2023 Negative     Urobilinogen Urine (E.U./dL)   Date Value   04/04/2023 0.2     Nitrite Urine (no units)   Date Value   04/04/2023 Negative     Leukocyte Esterase Urine (no units)   Date Value   04/04/2023 Small (A)     CBC RESULTS:   Recent Labs   Lab Test 09/30/21  1523   WBC 6.7   RBC 4.43   HGB 14.1   HCT 41.8   MCV 94   MCH 31.8   MCHC 33.7   RDW 12.7            A/P: Ellie Lewis is a 73 year old F with   Ellie was seen today for consult.    Diagnoses and all orders for this visit:    Mixed incontinence    Vaginal atrophy  -     estradiol (ESTRACE) 0.1 MG/GM vaginal cream; Place 1 g vaginally three times a week Ok to use your fingers or the applicator        Today we discussed the following    Bladder retraining   Reducing caffeinated beverages to no more than 1 cup a day  Kegel exercises twice a day ( pair it with her medication intake so she  can remember)  Reduce evening fluids to improve night time symptoms  Follow up with me in 3 months virtually. If not improving, then we will consider other options (medications/procedures etc)       I spent a total of 45 minutes with  Ellie Lewis  on the date of the encounter in chart review, face to face patient visit, review of tests, documentation and/or discussion with other providers about the issues documented above.     Gali Christianson MD, [facility]  , Department of OBGYN  Female Pelvic Medicine and Reconstructive Surgery ( Urogynecology)  CC  Patient Care Team:  Rima Agarwal MD as PCP - General (Family Practice)  Rima Agarwal MD as Assigned PCP  Gali Christianson MD as MD (OB/Gyn)

## 2023-08-09 NOTE — PATIENT INSTRUCTIONS
"Aisha Singh great meeting you today    As we discussed  You have  overactive bladder/ urge incontinence but also some stress incontinence. See below for more information on these conditions.     Today we discussed the following options  - behavioral retraining with urge suppression where you squeeze your kegel muscles a few times when you have a strong urge to suppress the urge a little bit before gently walking to the bathroom .  -kegel exercises daily ( 20 in the morning and 20 at night) . Pair it with something you do everyday so you don't forget. Relax your pelvic floor muscles when emptying your bladder or your bowel.   --Minimizing  acidic (citrus fruits especially) , caffeinated , alcoholic or carbonated drinks or artificial sweetners as these are bladder irritants -Limiting fluid intake to half of your body weight in ounces  -avoiding drinking fluids after 6 pm to avoid urge incontinence during the night .   -You can also try timed voiding in which you empty your bladder every  Hour  and a half during the day ( by setting an alarm) . DO this for one week and increase by 15 minutes every week until you are emptying every 2-2.5 hrs or so without leaking. Once you get to this point, you can stop setting your alarm .   -Work on relaxation and minimizing stress and anxiety as these will ampilify urgency and make your symptoms worse.   - Vaginal dryness/atrophy can increase urgency symptoms. This will get better with vaginal estrogen cream.  This will help with urinary urgency, frequency, and also decrease risk of urinary tract infection. Use it 2-3 times a week in the evening.     -I would encourage you to also consider uploading an ELINA to help you with managing your overactive bladder symptoms    [employer]: SUFU OAB CCP Elina My Bladder (iPhone)  Android: SUFU OAB CCP Elina My Bladder (Android)    The SUFU OAB CCP Smartphone Elina will serve as a \"digital navigator\".  The Elina provides patients with easy access to "     Inputting of OAB medications with dose/time to aid in medication compliance.  Automatic reminders for medications.  Automatic reminders for behavioral therapies and pelvic floor exercises.  Links to patient educational materials (dietary modifications, pelvic floor exercises and behavioral strategies, improving bladder habits).  Bladder diary with urge severity scale.  Reminders at specified time points for self-assessments (uses the PGII validated questionnaire).  Reminders for cutting back on excessive fluids.  Reminders for timed voiding.  Reminders for future provider appointments.    If Behavioral interventions don't work, we have other options such as   Medications:  Posterior tibial nerve stimulation therapy . This involves a needle electric stimulation of your tibial nerve that travels up your legs to help with your urgency incontinence symptoms. This requires commitment to 12 weeks of therapy in which you come once a week for 30 minutes to receive therapy.   -Implantable nerve stimulation procedure for the bladder  -Botox injection in the bladder to be done on a recurrent basis    If you have any questions, you may contact me by email or phone    Gali Christianson MD, MCR      Bladder Control Problems    If you or someone you know is affected by loss of bladder control, you are not alone. Urinary incontinence affects 30 to 50% of women; although the rates go up with age, incontinence among young women is quite common. This condition affects men and women, although it is nearly twice as common in women. The prevalence of urinary incontinence does increase with age, but it is not considered normal at any age. Doctors should routinely question women over age 65 about bladder problems, including overactive bladder.  Certain events or conditions may make a woman more likely to experience urinary incontinence. Sometimes, very clear-cut events such as pregnancy, vaginal delivery, surgery, radiation or accidental  injury can lead to these kinds of problems; other times, causes may be much less well-defined. Some other causes include:  Chronic constipation, which causes excessive bearing down.   Some lung conditions, where pressure from breathing disorders can increase the pressure in the abdomen and pelvis.   Neurological conditions, like multiple sclerosis or spina bifida, where nerves and/or muscles may not function correctly.   Certain occupations (usually those that involve heavy lifting or exertion) may also increase the risk.   Some kinds of urinary incontinence can be related to medications taken for other health conditions (such as diuretics), or smoking and caffeine use, and obesity certainly has an effect.   Uncommonly, certain other health conditions such as kidney or bladder stones, or even some forms of cancer can cause the bladder to lose urine.   And, in many cases, there is no obvious underlying reason for why bladder control problems occur.   Many women who have these kinds of bladder control problems are reluctant to discuss them with anyone, or are embarrassed to acknowledge that they have a problem, even to themselves. Sometimes women are made to feel that these conditions are  normal,  especially as they get older, and that, since bladder control problems like this are rarely life-threatening, they are not really a problem. These points of view are often shared among family and friends, or even among some healthcare providers.  But the truth of the matter is that urinary incontinence can have a very significant impact. We know that it can undermine your sense of well-being and self-worth, and your ability to live your life the way you want. Scientific studies indicate that quality of life measures significantly decrease when a woman experiences these kinds of bladder control problems. After experiencing these problems, women may begin to stop exercising or participating in physical or social activities,  which can further reduce health and quality of life. Work activities, travel and intimacy also may suffer as a result.  But there is no reason to allow this to continue. The good news is that 80 to 90 % of women who seek treatment will experience significant improvement. A wide array of treatment options, ranging from behavioral and diet changes all the way to surgical options exist, and are used every day to help women recover parts of their lives they may have let go. Get evaluated and review treatment options appropriate for your urinary incontinence. The more you know, the more confident you will be in choosing the direction of treatment.  Types of Bladder Control Problems  Female urinary incontinence can be grouped in several distinct categories, although women often have symptoms found in more than one category (i.e., mixed incontinence).  Stress Incontinence: Urine leakage occurs with increases in abdominal pressure (hence, mechanical  stress ).        Stress urinary incontinence is loss of urine that occurs at the same time as physical activities that increase abdominal pressure (such as sneezing, coughing, laughing, and exercising). These activities can increase the pressure within the bladder, which behaves like a balloon filled with liquid. The rise in pressure can push urine out through the urethra, especially when the support to the urethra has been weakened; this is what we call stress urinary incontinence.      Pregnancy and delivery can have significant effects on the mechanisms of continence. Obstetricians are becoming more and more aware of the risks of injury to the pelvic floor caused by vaginal delivery. Excessive stretching of the supportive tissues, muscles and nerves, can cause permanent defects even after post-pregnancy healing. This may lead to various pelvic floor support problems for the surrounding organs. Although the urinary incontinence often resolves in the first few months after  "delivery, its initial presentation may signal the development of more troublesome incontinence in the future.      Some women with stress incontinence may note only occasional leaks, only with aggressive exercise, colds or allergies, or at times when the bladder is especially full. Other women have a great deal of leakage with simple activities such as getting up out of a chair, or simple walking. Although the severity may vary, many women find that these symptoms begin to limit their physical or social activities, and can have a serious impact on quality of life.  Urge Incontinence: Often referred to as  overactive bladder.  Inability to hold urine long enough to reach restroom.       The term  overactive bladder  is sometimes used to refer to any of the following conditions:  Frequency (more than 8 voids in each 24 hours)   Urgency (a powerful urge to urinate, that is difficult to put off)   Nocturia (waking up twice or more at night to urinate)   Urge incontinence (leakage of urine associated with an urge to urinate, or not making it to the bathroom in time)       When leakage of urine is accompanied by a sensation of the need to urinate, or the impending sense that a large leak is going to happen, this is often what is known as urge incontinence. Unlike stress incontinence, this usually represents a bladder \"squeeze\" or contraction, occurring at an unwelcome time. Often, people with urge incontinence also have increased urinary frequency, have to rush to the bathroom frequently, or wake up more than once or twice at night to urinate. You may also notice severe urgency and leakage when driving into the driveway, placing the key in the front door, running water or with temperature changes.      These are very common conditions, and their impact can vary widely. Their causes are less well understood than in stress incontinence. Occasionally, there may be an underlying reason, like neurologic or inflammatory " conditions; in most cases, no particular cause can be identified. Whether or not an underlying cause is identified, the effects of overactive bladder and urge incontinence can be significant.  Mixed Incontinence: When two or more causes contribute to urinary incontinence. Often refers to the presence of both stress and urge incontinence. For example, someone has the combination of stress incontinence (leaking with coughing, sneezing, exercise, etc.) and urge incontinence (leaking along with a need to get to the bathroom), this is known as mixed urinary incontinence. Often, a woman may first experience one kind of leaking, and finds that the other begins to occur later.  Overflow Incontinence: Leakage or  spill-over  of urine when the quantity of urine exceeds the bladder's capacity to hold it. This generally happens when there is some blockage or obstruction to the bladder's emptying; the bladder is unable to empty well, and small amounts of leakage happen frequently. This kind of leakage is less common among women, unless they have had bladder surgery, vaginal prolapse, or some less common condition.  Functional Incontinence: Leakage (usually resulting from one or more causes) due to factors impairing your ability to reach the restroom in time because of physical conditions (e.g., arthritis). This may or may not represent a problem of the pelvic floor, but should certainly be addressed with a health-care provider.  Fistula or Diverticulum: When urine collects in a pouch within the urethra, or flows directly through an abnormal tract into the vagina. They usually happen after some kind of surgery, trauma or radiation to the area and are relatively uncommon.

## 2023-08-09 NOTE — NURSING NOTE
Patient dumped urine before I could measure output-  Francisco stated it was about an inch of urine in hat.  PVR was 66 ml

## 2023-10-18 ENCOUNTER — TELEPHONE (OUTPATIENT)
Dept: FAMILY MEDICINE | Facility: CLINIC | Age: 73
End: 2023-10-18
Payer: COMMERCIAL

## 2023-10-18 ASSESSMENT — ANXIETY QUESTIONNAIRES
2. NOT BEING ABLE TO STOP OR CONTROL WORRYING: NOT AT ALL
GAD7 TOTAL SCORE: 2
6. BECOMING EASILY ANNOYED OR IRRITABLE: SEVERAL DAYS
IF YOU CHECKED OFF ANY PROBLEMS ON THIS QUESTIONNAIRE, HOW DIFFICULT HAVE THESE PROBLEMS MADE IT FOR YOU TO DO YOUR WORK, TAKE CARE OF THINGS AT HOME, OR GET ALONG WITH OTHER PEOPLE: NOT DIFFICULT AT ALL
5. BEING SO RESTLESS THAT IT IS HARD TO SIT STILL: NOT AT ALL
7. FEELING AFRAID AS IF SOMETHING AWFUL MIGHT HAPPEN: NOT AT ALL
1. FEELING NERVOUS, ANXIOUS, OR ON EDGE: NOT AT ALL
GAD7 TOTAL SCORE: 2
3. WORRYING TOO MUCH ABOUT DIFFERENT THINGS: SEVERAL DAYS

## 2023-10-18 ASSESSMENT — PATIENT HEALTH QUESTIONNAIRE - PHQ9
SUM OF ALL RESPONSES TO PHQ QUESTIONS 1-9: 7
5. POOR APPETITE OR OVEREATING: NOT AT ALL

## 2023-10-18 NOTE — TELEPHONE ENCOUNTER
Patient calling due to being informed she needs an updated shingles shot.    Per review of her chart patient appears to have the vaccine up to date and received both vaccines in 2020.    Patient was due for PHQ-9 and OZ-7.    Patient completed these screenings over the phone.    Patient denies concerns related to mental health and denies side effects from medications.    Routing to Dr. Agarwal with screening updates.    Reyna Mac RN on 10/18/2023 at 4:23 PM

## 2023-10-26 PROBLEM — R27.8 MUSCULAR INCOORDINATION: Status: RESOLVED | Noted: 2023-04-25 | Resolved: 2023-10-26

## 2023-10-26 PROBLEM — N39.46 MIXED INCONTINENCE: Status: RESOLVED | Noted: 2023-04-25 | Resolved: 2023-10-26

## 2023-10-26 NOTE — PROGRESS NOTES
06/01/23 0500   Appointment Info   Signing clinician's name / credentials Leydi Davis, PT DPT   Total/Authorized Visits 9   Visits Used 4   Medical Diagnosis Urinary incontinence without sensory awareness; Urinary incontinence  Muscular incoordination   PT Tx Diagnosis Urinary incontinence   Quick Adds Certification   Progress Note/Certification   Start of Care Date 04/25/23   Onset of illness/injury or Date of Surgery 04/04/23  (MD order)   Therapy Frequency for 6 weeks, decreasing to 1x/2 weeks for 6 weeks   Predicted Duration 12 weeks total   Certification date from 04/25/23   Certification date to 07/18/23   Subjective Report   Subjective Report Feels that while initially saw some improvement, efels the last week has been more difficult. Wondering if the heat is playing a role. Still having alot of trouble getting out of the car after car rides of 30+ minutes. Notes during day going to bathroom 4-5 times a day, and ate night 2-3 times. Urge is so significant getting out of car and cant control it, has difficult time decreasing so when stands up then leaks.   Objective Measures   Objective Measures Objective Measure 1   Objective Measure 1   Objective Measure assessment   Details mild anterior wall weakness noted. grade 2-3/5   Treatment Interventions (PT)   Interventions Neuromuscular Re-education;Self Care/Home Management   Neuromuscular Re-education   Neuromuscular re-ed of mvmt, balance, coord, kinesthetic sense, posture, proprioception minutes (04585) 15   Neuro Re-ed 1 PF engagement   Neuro Re-ed 1 - Details with verbal and tactile cueing to improve strength of contraction. working on coordination w/ breath and pinch/squeeze/lift from all directions   Skilled Intervention nmr to improve muscle engagemnet and activity   Patient Response/Progress in understanding   Self Care/home Management   ADL/Home Mgmt Training (65086) 25   Self Care 1 pt education   Self Care 1 - Details extensive amount of time  spent with patient with review of bladder irritants, effect on symptoms. discuss urge supression techniques, role of bladder, urge, anatomy in terms of supression and retraining techniques due to urge incontinence in specific situations resulting in most of symptoms   Skilled Intervention pt education to improve self management   Patient Response/Progress asks appropraite questions demonstrating learning   Plan   Home program see above/PTRX   Plan for next session recheck strength, cont to work on retrianing techniques   Total Session Time   Timed Code Treatment Minutes 40   Total Treatment Time (sum of timed and untimed services) 40         DISCHARGE  Reason for Discharge: Patient chooses to discontinue therapy.    Equipment Issued: na    Discharge Plan: Patient to continue home program.    Referring Provider:  Rima Agarwal

## 2023-12-14 DIAGNOSIS — F33.41 RECURRENT MAJOR DEPRESSIVE DISORDER, IN PARTIAL REMISSION (H): ICD-10-CM

## 2023-12-14 NOTE — TELEPHONE ENCOUNTER
Routing refill request to provider for review/approval because:  Labs out of range:        2/28/2023     8:34 PM 4/3/2023     9:16 AM 10/18/2023     4:15 PM   PHQ   PHQ-9 Total Score 5 13 7   Q9: Thoughts of better off dead/self-harm past 2 weeks Not at all Not at all Not at all         1/24/2022     8:58 AM 2/28/2023     8:35 PM 10/18/2023     4:15 PM   OZ-7 SCORE   Total Score  3 (minimal anxiety)    Total Score 3 3 2       Last Written Prescription Date:  4/4/23  Last Fill Quantity: 270,  # refills: 1   Last office visit: 4/4/2023 ; last virtual visit: 3/29/2022 with prescribing provider:     Future Office Visit:      Julie Behrendt RN

## 2024-02-27 ENCOUNTER — TRANSCRIBE ORDERS (OUTPATIENT)
Dept: PHYSICAL MEDICINE AND REHAB | Facility: CLINIC | Age: 74
End: 2024-02-27
Payer: COMMERCIAL

## 2024-02-27 DIAGNOSIS — M54.16 LUMBAR RADICULOPATHY: Primary | ICD-10-CM

## 2024-02-27 DIAGNOSIS — M54.50 LUMBAR PAIN: ICD-10-CM

## 2024-03-11 ENCOUNTER — THERAPY VISIT (OUTPATIENT)
Dept: PHYSICAL THERAPY | Facility: CLINIC | Age: 74
End: 2024-03-11
Attending: PHYSICIAN ASSISTANT
Payer: COMMERCIAL

## 2024-03-11 DIAGNOSIS — G89.29 CHRONIC LOW BACK PAIN: Primary | ICD-10-CM

## 2024-03-11 DIAGNOSIS — M54.50 CHRONIC LOW BACK PAIN: Primary | ICD-10-CM

## 2024-03-11 PROCEDURE — 97110 THERAPEUTIC EXERCISES: CPT | Mod: GP | Performed by: PHYSICAL THERAPIST

## 2024-03-11 PROCEDURE — 97140 MANUAL THERAPY 1/> REGIONS: CPT | Mod: GP | Performed by: PHYSICAL THERAPIST

## 2024-03-11 PROCEDURE — 97161 PT EVAL LOW COMPLEX 20 MIN: CPT | Mod: GP | Performed by: PHYSICAL THERAPIST

## 2024-03-11 NOTE — PROGRESS NOTES
PHYSICAL THERAPY EVALUATION  Type of Visit: Evaluation    See electronic medical record for Abuse and Falls Screening details.    Subjective       Presenting condition or subjective complaint: Lower back pain  Lower back pain, had XRay and MRI.   Intermittent shooting left leg pain.   Heat, rest helps. Note symptoms more with standing/walking.   Date of onset: 02/26/24    Relevant medical history: Arthritis; Bladder or bowel problems; Depression; Incontinence; Osteoarthritis; Overweight; Thyroid problems   Dates & types of surgery: Hysterectomy left and right hip replacements    Prior diagnostic imaging/testing results: MRI; X-ray   Severe multilevel spondylosis extending from L1 to S1   Prior therapy history for the same diagnosis, illness or injury: Yes Dont remember    Prior Level of Function  Transfers:   Ambulation:   ADL:   IADL:     Living Environment  Social support: With a significant other or spouse   Type of home: [facility]   Stairs to enter the home: Yes 2 Is there a railing: No   Ramp: No   Stairs inside the home: Yes 21 Is there a railing: Yes   Help at home: None  Equipment owned:       Employment: No    Hobbies/Interests: Cooking and reading    Patient goals for therapy: Be normally active    Pain assessment: See objective evaluation for additional pain details     Objective   LUMBAR SPINE EVALUATION  PAIN: Pain Location: lumbar spine and L hip  INTEGUMENTARY (edema, incisions):   POSTURE:   GAIT:   Weightbearing Status:   Assistive Device(s):   Gait Deviations:   BALANCE/PROPRIOCEPTION:   WEIGHTBEARING ALIGNMENT:   NON-WEIGHTBEARING ALIGNMENT:    ROM:   Flexion to ankle, extension to neutral  R SB w/ mild limtation, L WFL  Quadrant +  PELVIC/SI SCREEN:   STRENGTH:   TrA engagement demonstrated  Hip abduction and extension 4/5 B      MYOTOMES: WNL  DTR S:   CORD SIGNS:   DERMATOMES: WNL  NEURAL TENSION: Lumbar WNL  FLEXIBILITY:   LUMBAR/HIP Special Tests:    PELVIS/SI SPECIAL TESTS:   FUNCTIONAL  TESTS:   PALPATION:  tender B paraspinals L2-L4   SPINAL SEGMENTAL CONCLUSIONS:       Assessment & Plan   CLINICAL IMPRESSIONS  Medical Diagnosis: Lumbar radiculopathy, Lumbar pain    Treatment Diagnosis: Chronic LBP   Impression/Assessment: Patient is a 74 year old female with chronic low back pain complaints.  The following significant findings have been identified: Pain, Decreased ROM/flexibility, Decreased joint mobility, Decreased strength, Impaired muscle performance, and Decreased activity tolerance. These impairments interfere with their ability to perform self care tasks, work tasks, household mobility, and community mobility as compared to previous level of function.     Clinical Decision Making (Complexity):  Clinical Presentation: Stable/Uncomplicated  Clinical Presentation Rationale: based on medical and personal factors listed in PT evaluation  Clinical Decision Making (Complexity): Low complexity     PLAN OF CARE  Treatment Interventions:  Interventions: Manual Therapy, Neuromuscular Re-education, Therapeutic Activity, Therapeutic Exercise, Self-Care/Home Management    Long Term Goals     PT Goal 1  Goal Identifier: 1  Goal Description: Pt will be able to walk 10 minutse without increased symptoms  Rationale: to maximize safety and independence with performance of ADLs and functional tasks;to maximize safety and independence within the home;to maximize safety and independence within the community  Target Date: 05/06/24      Frequency of Treatment: 1x/week  Duration of Treatment: 8 weeks    Recommended Referrals to Other Professionals:   Education Assessment:        Risks and benefits of evaluation/treatment have been explained.   Patient/Family/caregiver agrees with Plan of Care.     Evaluation Time:     PT Eval, Low Complexity Minutes (19446): 10       Signing Clinician: Leydi Davis PT      [facility]                                                                                    OUTPATIENT PHYSICAL THERAPY      PLAN OF TREATMENT FOR OUTPATIENT REHABILITATION   Patient's Last Name, First Name, Ellie Sanchez YOB: 1950   Provider's Name   [facility]   Medical Record No.  [id number]     Onset Date: 02/26/24  Start of Care Date: 03/11/24     Medical Diagnosis:  Lumbar radiculopathy, Lumbar pain      PT Treatment Diagnosis:  Chronic LBP Plan of Treatment  Frequency/Duration: 1x/week/ 8 weeks    Certification date from 03/11/24 to 05/06/24         See note for plan of treatment details and functional goals     Leydi Davis, PT                         I CERTIFY THE NEED FOR THESE SERVICES FURNISHED UNDER        THIS PLAN OF TREATMENT AND WHILE UNDER MY CARE .             Physician Signature               Date    X_____________________________________________________                  Referring Provider:  Byron Aquino    Initial Assessment  See Epic Evaluation- Start of Care Date: 03/11/24

## 2024-03-25 ENCOUNTER — THERAPY VISIT (OUTPATIENT)
Dept: PHYSICAL THERAPY | Facility: CLINIC | Age: 74
End: 2024-03-25
Payer: COMMERCIAL

## 2024-03-25 DIAGNOSIS — M54.50 CHRONIC LOW BACK PAIN: Primary | ICD-10-CM

## 2024-03-25 DIAGNOSIS — G89.29 CHRONIC LOW BACK PAIN: Primary | ICD-10-CM

## 2024-03-25 PROCEDURE — 97110 THERAPEUTIC EXERCISES: CPT | Mod: GP | Performed by: PHYSICAL THERAPIST

## 2024-04-04 DIAGNOSIS — E03.9 ACQUIRED HYPOTHYROIDISM: ICD-10-CM

## 2024-04-07 RX ORDER — LEVOTHYROXINE SODIUM 100 UG/1
100 TABLET ORAL DAILY
Qty: 30 TABLET | Refills: 0 | Status: SHIPPED | OUTPATIENT
Start: 2024-04-07 | End: 2024-04-18

## 2024-04-08 ENCOUNTER — THERAPY VISIT (OUTPATIENT)
Dept: PHYSICAL THERAPY | Facility: CLINIC | Age: 74
End: 2024-04-08
Payer: COMMERCIAL

## 2024-04-08 DIAGNOSIS — G89.29 CHRONIC LOW BACK PAIN: Primary | ICD-10-CM

## 2024-04-08 DIAGNOSIS — M54.50 CHRONIC LOW BACK PAIN: Primary | ICD-10-CM

## 2024-04-08 PROCEDURE — 97140 MANUAL THERAPY 1/> REGIONS: CPT | Mod: GP | Performed by: PHYSICAL THERAPIST

## 2024-04-08 PROCEDURE — 97110 THERAPEUTIC EXERCISES: CPT | Mod: GP | Performed by: PHYSICAL THERAPIST

## 2024-04-08 NOTE — PROGRESS NOTES
04/08/24 0500   Appointment Info   Signing clinician's name / credentials Leydi Davis PT DPT   Total/Authorized Visits 8   Visits Used 3   Medical Diagnosis Lumbar radiculopathy, Lumbar pain   PT Tx Diagnosis Chronic LBP   Quick Adds Certification   Progress Note/Certification   Start of Care Date 03/11/24   Onset of illness/injury or Date of Surgery 02/26/24   Therapy Frequency 1x/week   Predicted Duration 8 weeks   Certification date from 03/11/24   Certification date to 05/06/24   PT Goal 1   Goal Identifier 1   Goal Description Pt will be able to walk 10 minutes without increased symptoms   Rationale to maximize safety and independence with performance of ADLs and functional tasks;to maximize safety and independence within the home;to maximize safety and independence within the community   Goal Progress continues to improve - 10 minutes   Target Date 05/06/24   Date Met 04/08/24   Subjective Report   Subjective Report Francisco reports that walking has gotten better. Back a bit sore intermittently with new exercises but stretching has been effective.   Treatment Interventions (PT)   Interventions Therapeutic Procedure/Exercise;Manual Therapy   Therapeutic Procedure/Exercise   Therapeutic Procedures: strength, endurance, ROM, flexibility minutes (53198) 33   PTRx Ther Proc 1 Supine Lumbar Hip Roll   PTRx Ther Proc 1 - Details x 5 B mobility   PTRx Ther Proc 2 Single Knee to Chest   PTRx Ther Proc 2 - Details x 20s holds   PTRx Ther Proc 3 Piriformis Stretch Above 90 Degress Supine   PTRx Ther Proc 3 - Details x 20s holds   PTRx Ther Proc 4 Pretzel Stretch   PTRx Ther Proc 4 - Details hold from HEP   PTRx Ther Proc 5 Supine Abdominal Exercise #3 (Marching)   PTRx Ther Proc 5 - Details w/ TRA engagement   PTRx Ther Proc 6 Clamshell Feet together   PTRx Ther Proc 6 - Details review, adjust as needed   PTRx Ther Proc 7 Hip Abduction Straight Leg Raise   PTRx Ther Proc 7 - Details review, adjust   Skilled  Intervention mobility and strength, guided exercises to address impairments   Patient Response/Progress cont strength/mobility and in understanding of exercises   PTRx Ther Proc 8 Standing Hip Abduction   PTRx Ther Proc 8 - Details x 10 B   PTRx Ther Proc 9 Standing Fire Hydrant   PTRx Ther Proc 9 - Details review, no resistance x 10   PTRx Ther Proc 10 Shoulder Theraband Extension   PTRx Ther Proc 10 - Details red TB, TrA precontract   Neuromuscular Re-education   PTRx Neuro Re-ed 1 Abdominal Brace Transverse Abdominis   PTRx Neuro Re-ed 1 - Details review, engagement, breathing   PTRx Neuro Re-ed 2 Bridging Pelvic Floor    PTRx Neuro Re-ed 2 - Details w/ TrA pre-contract   Manual Therapy   Manual Therapy: Mobilization, MFR, MLD, friction massage minutes (12589) 5   Manual Therapy 1 LLE   Manual Therapy 1 - Details Long axis distraction/traction . performance and edu for pt to complete at home   Skilled Intervention MT, improve mobility   Patient Response/Progress comfortable stretch   Plan   Home program printed ptrx   Updates to plan of care dc to HEP   Total Session Time   Timed Code Treatment Minutes 38   Total Treatment Time (sum of timed and untimed services) 38         DISCHARGE  Reason for Discharge: Patient has met all goals.    Equipment Issued: na     Discharge Plan: Patient to continue home program.    Referring Provider:  Byron Aquino

## 2024-04-11 SDOH — HEALTH STABILITY: PHYSICAL HEALTH: ON AVERAGE, HOW MANY MINUTES DO YOU ENGAGE IN EXERCISE AT THIS LEVEL?: 10 MIN

## 2024-04-11 SDOH — HEALTH STABILITY: PHYSICAL HEALTH: ON AVERAGE, HOW MANY DAYS PER WEEK DO YOU ENGAGE IN MODERATE TO STRENUOUS EXERCISE (LIKE A BRISK WALK)?: 3 DAYS

## 2024-04-11 ASSESSMENT — SOCIAL DETERMINANTS OF HEALTH (SDOH): HOW OFTEN DO YOU GET TOGETHER WITH FRIENDS OR RELATIVES?: ONCE A WEEK

## 2024-04-17 ASSESSMENT — PATIENT HEALTH QUESTIONNAIRE - PHQ9
SUM OF ALL RESPONSES TO PHQ QUESTIONS 1-9: 6
SUM OF ALL RESPONSES TO PHQ QUESTIONS 1-9: 6
10. IF YOU CHECKED OFF ANY PROBLEMS, HOW DIFFICULT HAVE THESE PROBLEMS MADE IT FOR YOU TO DO YOUR WORK, TAKE CARE OF THINGS AT HOME, OR GET ALONG WITH OTHER PEOPLE: NOT DIFFICULT AT ALL

## 2024-04-18 ENCOUNTER — OFFICE VISIT (OUTPATIENT)
Dept: FAMILY MEDICINE | Facility: CLINIC | Age: 74
End: 2024-04-18
Attending: FAMILY MEDICINE
Payer: COMMERCIAL

## 2024-04-18 VITALS
BODY MASS INDEX: 29.02 KG/M2 | SYSTOLIC BLOOD PRESSURE: 102 MMHG | WEIGHT: 170 LBS | TEMPERATURE: 98.1 F | HEART RATE: 68 BPM | OXYGEN SATURATION: 95 % | HEIGHT: 64 IN | DIASTOLIC BLOOD PRESSURE: 64 MMHG

## 2024-04-18 DIAGNOSIS — Z78.0 POSTMENOPAUSAL STATUS: ICD-10-CM

## 2024-04-18 DIAGNOSIS — Z00.00 ENCOUNTER FOR MEDICARE ANNUAL WELLNESS EXAM: Primary | ICD-10-CM

## 2024-04-18 DIAGNOSIS — E03.4 HYPOTHYROIDISM DUE TO ACQUIRED ATROPHY OF THYROID: ICD-10-CM

## 2024-04-18 DIAGNOSIS — F33.41 RECURRENT MAJOR DEPRESSIVE DISORDER, IN PARTIAL REMISSION (H): ICD-10-CM

## 2024-04-18 DIAGNOSIS — Z96.641 STATUS POST TOTAL REPLACEMENT OF RIGHT HIP: ICD-10-CM

## 2024-04-18 DIAGNOSIS — E03.9 ACQUIRED HYPOTHYROIDISM: ICD-10-CM

## 2024-04-18 LAB — TSH SERPL DL<=0.005 MIU/L-ACNC: 1.57 UIU/ML (ref 0.3–4.2)

## 2024-04-18 PROCEDURE — 84443 ASSAY THYROID STIM HORMONE: CPT | Performed by: FAMILY MEDICINE

## 2024-04-18 PROCEDURE — 36415 COLL VENOUS BLD VENIPUNCTURE: CPT | Performed by: FAMILY MEDICINE

## 2024-04-18 PROCEDURE — 99213 OFFICE O/P EST LOW 20 MIN: CPT | Mod: 25 | Performed by: FAMILY MEDICINE

## 2024-04-18 PROCEDURE — G0439 PPPS, SUBSEQ VISIT: HCPCS | Performed by: FAMILY MEDICINE

## 2024-04-18 RX ORDER — BUPROPION HYDROCHLORIDE 100 MG/1
TABLET, EXTENDED RELEASE ORAL
Qty: 180 TABLET | Refills: 2 | Status: SHIPPED | OUTPATIENT
Start: 2024-04-18

## 2024-04-18 RX ORDER — LEVOTHYROXINE SODIUM 100 UG/1
100 TABLET ORAL DAILY
Qty: 90 TABLET | Refills: 3 | Status: SHIPPED | OUTPATIENT
Start: 2024-04-18

## 2024-04-18 RX ORDER — AMOXICILLIN 500 MG/1
TABLET, FILM COATED ORAL
COMMUNITY
Start: 2023-11-30 | End: 2024-04-18

## 2024-04-18 RX ORDER — AMOXICILLIN 500 MG/1
500 TABLET, FILM COATED ORAL ONCE
Qty: 4 TABLET | Refills: 3 | Status: SHIPPED | OUTPATIENT
Start: 2024-04-18 | End: 2024-04-18

## 2024-04-18 ASSESSMENT — ANXIETY QUESTIONNAIRES
2. NOT BEING ABLE TO STOP OR CONTROL WORRYING: SEVERAL DAYS
GAD7 TOTAL SCORE: 3
IF YOU CHECKED OFF ANY PROBLEMS ON THIS QUESTIONNAIRE, HOW DIFFICULT HAVE THESE PROBLEMS MADE IT FOR YOU TO DO YOUR WORK, TAKE CARE OF THINGS AT HOME, OR GET ALONG WITH OTHER PEOPLE: NOT DIFFICULT AT ALL
3. WORRYING TOO MUCH ABOUT DIFFERENT THINGS: SEVERAL DAYS
1. FEELING NERVOUS, ANXIOUS, OR ON EDGE: SEVERAL DAYS
GAD7 TOTAL SCORE: 3
5. BEING SO RESTLESS THAT IT IS HARD TO SIT STILL: NOT AT ALL
4. TROUBLE RELAXING: NOT AT ALL
7. FEELING AFRAID AS IF SOMETHING AWFUL MIGHT HAPPEN: NOT AT ALL
7. FEELING AFRAID AS IF SOMETHING AWFUL MIGHT HAPPEN: NOT AT ALL
8. IF YOU CHECKED OFF ANY PROBLEMS, HOW DIFFICULT HAVE THESE MADE IT FOR YOU TO DO YOUR WORK, TAKE CARE OF THINGS AT HOME, OR GET ALONG WITH OTHER PEOPLE?: NOT DIFFICULT AT ALL
6. BECOMING EASILY ANNOYED OR IRRITABLE: NOT AT ALL

## 2024-04-18 NOTE — PATIENT INSTRUCTIONS
Preventive Care Advice   This is general advice given by our system to help you stay healthy. However, your care team may have specific advice just for you. Please talk to your care team about your preventive care needs.  Nutrition  Eat 5 or more servings of fruits and vegetables each day.  Try wheat bread, brown rice and whole grain pasta (instead of white bread, rice, and pasta).  Get enough calcium and vitamin D. Check the label on foods and aim for 100% of the RDA (recommended daily allowance).  Lifestyle  Exercise at least 150 minutes each week   (30 minutes a day, 5 days a week).  Do muscle strengthening activities 2 days a week. These help control your weight and prevent disease.  No smoking.  Wear sunscreen to prevent skin cancer.  Have a dental exam and cleaning every 6 months.  Yearly exams  See your health care team every year to talk about:  Any changes in your health.  Any medicines your care team has prescribed.  Preventive care, family planning, and ways to prevent chronic diseases.  Shots (vaccines)   HPV shots (up to age 26), if you've never had them before.  Hepatitis B shots (up to age 59), if you've never had them before.  COVID-19 shot: Get this shot when it's due.  Flu shot: Get a flu shot every year.  Tetanus shot: Get a tetanus shot every 10 years.  Pneumococcal, hepatitis A, and RSV shots: Ask your care team if you need these based on your risk.  Shingles shot (for age 50 and up).  General health tests  Diabetes screening:  Starting at age 35, Get screened for diabetes at least every 3 years.  If you are younger than age 35, ask your care team if you should be screened for diabetes.  Cholesterol test: At age 39, start having a cholesterol test every 5 years, or more often if advised.  Bone density scan (DEXA): At age 50, ask your care team if you should have this scan for osteoporosis (brittle bones).  Hepatitis C: Get tested at least once in your life.  STIs (sexually transmitted  infections)  Before age 24: Ask your care team if you should be screened for STIs.  After age 24: Get screened for STIs if you're at risk. You are at risk for STIs (including HIV) if:  You are sexually active with more than one person.  You don't use condoms every time.  You or a partner was diagnosed with a sexually transmitted infection.  If you are at risk for HIV, ask about PrEP medicine to prevent HIV.  Get tested for HIV at least once in your life, whether you are at risk for HIV or not.  Cancer screening tests  Cervical cancer screening: If you have a cervix, begin getting regular cervical cancer screening tests at age 21. Most people who have regular screenings with normal results can stop after age 65. Talk about this with your provider.  Breast cancer scan (mammogram): If you've ever had breasts, begin having regular mammograms starting at age 40. This is a scan to check for breast cancer.  Colon cancer screening: It is important to start screening for colon cancer at age 45.  Have a colonoscopy test every 10 years (or more often if you're at risk) Or, ask your provider about stool tests like a FIT test every year or Cologuard test every 3 years.  To learn more about your testing options, visit: [email or web address]/[email or web address].  For help making a decision, visit: https://bit.ly/xf83237.  Prostate cancer screening test: If you have a prostate and are age 55 to 69, ask your provider if you would benefit from a yearly prostate cancer screening test.  Lung cancer screening: If you are a current or former smoker age 50 to 80, ask your care team if ongoing lung cancer screenings are right for you.  For informational purposes only. Not to replace the advice of your health care provider. Copyright   2023 [employer]. All rights reserved. Clinically reviewed by the [facility]. [employer] [id number] - REV 01/24.    Preventing Falls: Care Instructions  Injuries and health  problems such as trouble walking or poor eyesight can increase your risk of falling. So can some medicines. But there are things you can do to help prevent falls. You can exercise to get stronger. You can also arrange your home to make it safer.    Talk to your doctor about the medicines you take. Ask if any of them increase the risk of falls and whether they can be changed or stopped.   Try to exercise regularly. It can help improve your strength and balance. This can help lower your risk of falling.     Practice fall safety and prevention.    Wear low-heeled shoes that fit well and give your feet good support. Talk to your doctor if you have foot problems that make this hard.  Carry a cellphone or wear a medical alert device that you can use to call for help.  Use stepladders instead of chairs to reach high objects. Don't climb if you're at risk for falls. Ask for help, if needed.  Wear the correct eyeglasses, if you need them.    Make your home safer.    Remove rugs, cords, clutter, and furniture from walkways.  Keep your house well lit. Use night-lights in hallways and bathrooms.  Install and use sturdy handrails on stairways.  Wear nonskid footwear, even inside. Don't walk barefoot or in socks without shoes.    Be safe outside.    Use handrails, curb cuts, and ramps whenever possible.  Keep your hands free by using a shoulder bag or backpack.  Try to walk in well-lit areas. Watch out for uneven ground, changes in pavement, and debris.  Be careful in the winter. Walk on the grass or gravel when sidewalks are slippery. Use de-icer on steps and walkways. Add non-slip devices to shoes.    Put grab bars and nonskid mats in your shower or tub and near the toilet. Try to use a shower chair or bath bench when bathing.   Get into a tub or shower by putting in your weaker leg first. Get out with your strong side first. Have a phone or medical alert device in the bathroom with you.   Where can you learn more?  Go to  "https://www.nxtControl.net/patiented  Enter G117 in the search box to learn more about \"Preventing Falls: Care Instructions.\"  Current as of: July 17, 2023               Content Version: 14.0    2232-1878 [employer].   Care instructions adapted under license by your healthcare professional. If you have questions about a medical condition or this instruction, always ask your healthcare professional. [employer] disclaims any warranty or liability for your use of this information.      Hearing Loss: Care Instructions  Overview     Hearing loss is a sudden or slow decrease in how well you hear. It can range from slight to profound. Permanent hearing loss can occur with aging. It also can happen when you are exposed long-term to loud noise. Examples include listening to loud music, riding motorcycles, or being around other loud machines.  Hearing loss can affect your work and home life. It can make you feel lonely or depressed. You may feel that you have lost your independence. But hearing aids and other devices can help you hear better and feel connected to others.  Follow-up care is a key part of your treatment and safety. Be sure to make and go to all appointments, and call your doctor if you are having problems. It's also a good idea to know your test results and keep a list of the medicines you take.  How can you care for yourself at home?  Avoid loud noises whenever possible. This helps keep your hearing from getting worse.  Always wear hearing protection around loud noises.  Wear a hearing aid as directed.  A professional can help you pick a hearing aid that will work best for you.  You can also get hearing aids over the counter for mild to moderate hearing loss.  Have hearing tests as your doctor suggests. They can show whether your hearing has changed. Your hearing aid may need to be adjusted.  Use other devices as needed. These may include:  Telephone amplifiers and hearing aids that " "can connect to a television, stereo, radio, or microphone.  Devices that use lights or vibrations. These alert you to the doorbell, a ringing telephone, or a baby monitor.  Television closed-captioning. This shows the words at the bottom of the screen. Most new TVs can do this.  TTY (text telephone). This lets you type messages back and forth on the telephone instead of talking or listening. These devices are also called TDD. When messages are typed on the keyboard, they are sent over the phone line to a receiving TTY. The message is shown on a monitor.  Use text messaging, social media, and email if it is hard for you to communicate by telephone.  Try to learn a listening technique called speechreading. It is not lipreading. You pay attention to people's gestures, expressions, posture, and tone of voice. These clues can help you understand what a person is saying. Face the person you are talking to, and have them face you. Make sure the lighting is good. You need to see the other person's face clearly.  Think about counseling if you need help to adjust to your hearing loss.  When should you call for help?  Watch closely for changes in your health, and be sure to contact your doctor if:    You think your hearing is getting worse.     You have new symptoms, such as dizziness or nausea.   Where can you learn more?  Go to https://www.TIO Networks.net/patiented  Enter R798 in the search box to learn more about \"Hearing Loss: Care Instructions.\"  Current as of: September 27, 2023               Content Version: 14.0    5091-4096 [employer].   Care instructions adapted under license by your healthcare professional. If you have questions about a medical condition or this instruction, always ask your healthcare professional. [employer] disclaims any warranty or liability for your use of this information.      Learning About Stress  What is stress?     Stress is your body's response to a hard " situation. Your body can have a physical, emotional, or mental response. Stress is a fact of life for most people, and it affects everyone differently. What causes stress for you may not be stressful for someone else.  A lot of things can cause stress. You may feel stress when you go on a job interview, take a test, or run a race. This kind of short-term stress is normal and even useful. It can help you if you need to work hard or react quickly. For example, stress can help you finish an important job on time.  Long-term stress is caused by ongoing stressful situations or events. Examples of long-term stress include long-term health problems, ongoing problems at work, or conflicts in your family. Long-term stress can harm your health.  How does stress affect your health?  When you are stressed, your body responds as though you are in danger. It makes hormones that speed up your heart, make you breathe faster, and give you a burst of energy. This is called the fight-or-flight stress response. If the stress is over quickly, your body goes back to normal and no harm is done.  But if stress happens too often or lasts too long, it can have bad effects. Long-term stress can make you more likely to get sick, and it can make symptoms of some diseases worse. If you tense up when you are stressed, you may develop neck, shoulder, or low back pain. Stress is linked to high blood pressure and heart disease.  Stress also harms your emotional health. It can make you keith, tense, or depressed. Your relationships may suffer, and you may not do well at work or school.  What can you do to manage stress?  You can try these things to help manage stress:   Do something active. Exercise or activity can help reduce stress. Walking is a great way to get started. Even everyday activities such as housecleaning or yard work can help.  Try yoga or nisha chi. These techniques combine exercise and meditation. You may need some training at first to  learn them.  Do something you enjoy. For example, listen to music or go to a movie. Practice your hobby or do volunteer work.  Meditate. This can help you relax, because you are not worrying about what happened before or what may happen in the future.  Do guided imagery. Imagine yourself in any setting that helps you feel calm. You can use online videos, books, or a teacher to guide you.  Do breathing exercises. For example:  From a standing position, bend forward from the waist with your knees slightly bent. Let your arms dangle close to the floor.  Breathe in slowly and deeply as you return to a standing position. Roll up slowly and lift your head last.  Hold your breath for just a few seconds in the standing position.  Breathe out slowly and bend forward from the waist.  Let your feelings out. Talk, laugh, cry, and express anger when you need to. Talking with supportive friends or family, a counselor, or a enrrique leader about your feelings is a healthy way to relieve stress. Avoid discussing your feelings with people who make you feel worse.  Write. It may help to write about things that are bothering you. This helps you find out how much stress you feel and what is causing it. When you know this, you can find better ways to cope.  What can you do to prevent stress?  You might try some of these things to help prevent stress:  Manage your time. This helps you find time to do the things you want and need to do.  Get enough sleep. Your body recovers from the stresses of the day while you are sleeping.  Get support. Your family, friends, and community can make a difference in how you experience stress.  Limit your news feed. Avoid or limit time on social media or news that may make you feel stressed.  Do something active. Exercise or activity can help reduce stress. Walking is a great way to get started.  Where can you learn more?  Go to https://www.healthwise.net/patiented  Enter N032 in the search box to learn more  "about \"Learning About Stress.\"  Current as of: October 24, 2023               Content Version: 14.0    0976-0084 [employer] system.   Care instructions adapted under license by your healthcare professional. If you have questions about a medical condition or this instruction, always ask your healthcare professional. [employer] system disclaims any warranty or liability for your use of this information.      Learning About Sleeping Well  What does sleeping well mean?     Sleeping well means getting enough sleep to feel good and stay healthy. How much sleep is enough varies among people.  The number of hours you sleep and how you feel when you wake up are both important. If you do not feel refreshed, you probably need more sleep. Another sign of not getting enough sleep is feeling tired during the day.  Experts recommend that adults get at least 7 or more hours of sleep per day. Children and older adults need more sleep.  Why is getting enough sleep important?  Getting enough quality sleep is a basic part of good health. When your sleep suffers, your physical health, mood, and your thoughts can suffer too. You may find yourself feeling more grumpy or stressed. Not getting enough sleep also can lead to serious problems, including injury, accidents, anxiety, and depression.  What might cause poor sleeping?  Many things can cause sleep problems, including:  Changes to your sleep schedule.  Stress. Stress can be caused by fear about a single event, such as giving a speech. Or you may have ongoing stress, such as worry about work or school.  Depression, anxiety, and other mental or emotional conditions.  Changes in your sleep habits or surroundings. This includes changes that happen where you sleep, such as noise, light, or sleeping in a different bed. It also includes changes in your sleep pattern, such as having jet lag or working a late shift.  Health problems, such as pain, breathing problems, and restless " "legs syndrome.  Lack of regular exercise.  Using alcohol, nicotine, or caffeine before bed.  How can you help yourself?  Here are some tips that may help you sleep more soundly and wake up feeling more refreshed.  Your sleeping area   Use your bedroom only for sleeping and sex. A bit of light reading may help you fall asleep. But if it doesn't, do your reading elsewhere in the house. Try not to use your TV, computer, smartphone, or tablet while you are in bed.  Be sure your bed is big enough to stretch out comfortably, especially if you have a sleep partner.  Keep your bedroom quiet, dark, and cool. Use curtains, blinds, or a sleep mask to block out light. To block out noise, use earplugs, soothing music, or a \"white noise\" machine.  Your evening and bedtime routine   Create a relaxing bedtime routine. You might want to take a warm shower or bath, or listen to soothing music.  Go to bed at the same time every night. And get up at the same time every morning, even if you feel tired.  What to avoid   Limit caffeine (coffee, tea, caffeinated sodas) during the day, and don't have any for at least 6 hours before bedtime.  Avoid drinking alcohol before bedtime. Alcohol can cause you to wake up more often during the night.  Try not to smoke or use tobacco, especially in the evening. Nicotine can keep you awake.  Limit naps during the day, especially close to bedtime.  Avoid lying in bed awake for too long. If you can't fall asleep or if you wake up in the middle of the night and can't get back to sleep within about 20 minutes, get out of bed and go to another room until you feel sleepy.  Avoid taking medicine right before bed that may keep you awake or make you feel hyper or energized. Your doctor can tell you if your medicine may do this and if you can take it earlier in the day.  If you can't sleep   Imagine yourself in a peaceful, pleasant scene. Focus on the details and feelings of being in a place that is " "relaxing.  Get up and do a quiet or boring activity until you feel sleepy.  Avoid drinking any liquids before going to bed to help prevent waking up often to use the bathroom.  Where can you learn more?  Go to https://www.Poken.net/patiented  Enter J942 in the search box to learn more about \"Learning About Sleeping Well.\"  Current as of: July 10, 2023               Content Version: 14.0    6286-4148 [employer].   Care instructions adapted under license by your healthcare professional. If you have questions about a medical condition or this instruction, always ask your healthcare professional. [employer] disclaims any warranty or liability for your use of this information.      Bladder Training: Care Instructions  Your Care Instructions     Bladder training is used to treat urge incontinence and stress incontinence. Urge incontinence means that the need to urinate comes on so fast that you can't get to a toilet in time. Stress incontinence means that you leak urine because of pressure on your bladder. For example, it may happen when you laugh, cough, or lift something heavy.  Bladder training can increase how long you can wait before you have to urinate. It can also help your bladder hold more urine. And it can give you better control over the urge to urinate.  It is important to remember that bladder training takes a few weeks to a few months to make a difference. You may not see results right away, but don't give up.  Follow-up care is a key part of your treatment and safety. Be sure to make and go to all appointments, and call your doctor if you are having problems. It's also a good idea to know your test results and keep a list of the medicines you take.  How can you care for yourself at home?  Work with your doctor to come up with a bladder training program that is right for you. You may use one or more of the following methods.  Delayed urination  In the beginning, try to keep " "from urinating for 5 minutes after you first feel the need to go.  While you wait, take deep, slow breaths to relax. Kegel exercises can also help you delay the need to go to the bathroom.  After some practice, when you can easily wait 5 minutes to urinate, try to wait 10 minutes before you urinate.  Slowly increase the waiting period until you are able to control when you have to urinate.  Scheduled urination  Empty your bladder when you first wake up in the morning.  Schedule times throughout the day when you will urinate.  Start by going to the bathroom every hour, even if you don't need to go.  Slowly increase the time between trips to the bathroom.  When you have found a schedule that works well for you, keep doing it.  If you wake up during the night and have to urinate, do it. Apply your schedule to waking hours only.  Kegel exercises  These tighten and strengthen pelvic muscles, which can help you control the flow of urine. (If doing these exercises causes pain, stop doing them and talk with your doctor.) To do Kegel exercises:  Squeeze your muscles as if you were trying not to pass gas. Or squeeze your muscles as if you were stopping the flow of urine. Your belly, legs, and buttocks shouldn't move.  Hold the squeeze for 3 seconds, then relax for 5 to 10 seconds.  Start with 3 seconds, then add 1 second each week until you are able to squeeze for 10 seconds.  Repeat the exercise 10 times a session. Do 3 to 8 sessions a day.  When should you call for help?  Watch closely for changes in your health, and be sure to contact your doctor if:    Your incontinence is getting worse.     You do not get better as expected.   Where can you learn more?  Go to https://www.healthBatzu Media.net/patiented  Enter V684 in the search box to learn more about \"Bladder Training: Care Instructions.\"  Current as of: November 15, 2023               Content Version: 14.0    9692-3438 [employer].   Care instructions adapted " under license by your healthcare professional. If you have questions about a medical condition or this instruction, always ask your healthcare professional. [employer] disclaims any warranty or liability for your use of this information.      Learning About Depression Screening  What is depression screening?  Depression screening is a way to see if you have depression symptoms. It may be done by a doctor or counselor. It's often part of a routine checkup. That's because your mental health is just as important as your physical health.  Depression is a mental health condition that affects how you feel, think, and act. You may:  Have less energy.  Lose interest in your daily activities.  Feel sad and grouchy for a long time.  Depression is very common. It affects people of all ages.  Many things can lead to depression. Some people become depressed after they have a stroke or find out they have a major illness like cancer or heart disease. The death of a loved one or a breakup may lead to depression. It can run in families. Most experts believe that a combination of inherited genes and stressful life events can cause it.  What happens during screening?  You may be asked to fill out a form about your depression symptoms. You and the doctor will discuss your answers. The doctor may ask you more questions to learn more about how you think, act, and feel.  What happens after screening?  If you have symptoms of depression, your doctor will talk to you about your options.  Doctors usually treat depression with medicines or counseling. Often, combining the two works best. Many people don't get help because they think that they'll get over the depression on their own. But people with depression may not get better unless they get treatment.  The cause of depression is not well understood. There may be many factors involved. But if you have depression, it's not your fault.  A serious symptom of depression is  "thinking about death or suicide. If you or someone you care about talks about this or about feeling hopeless, get help right away.  It's important to know that depression can be treated. Medicine, counseling, and self-care may help.  Where can you learn more?  Go to https://www.GetSocial.net/patiented  Enter T185 in the search box to learn more about \"Learning About Depression Screening.\"  Current as of: June 24, 2023               Content Version: 14.0    3383-7224 [employer].   Care instructions adapted under license by your healthcare professional. If you have questions about a medical condition or this instruction, always ask your healthcare professional. [employer] disclaims any warranty or liability for your use of this information.      "

## 2024-04-18 NOTE — PROGRESS NOTES
"Preventive Care Visit  [facility] GEOFF Agarwal MD, Family Medicine  Apr 18, 2024      Assessment & Plan     Encounter for Medicare annual wellness exam      Hypothyroidism due to acquired atrophy of thyroid  Check today   - TSH WITH FREE T4 REFLEX; Future    Recurrent major depressive disorder, in partial remission (H24)  Good   - buPROPion (WELLBUTRIN SR) 100 MG 12 hr tablet; TAKE 1 TABLET BY MOUTH  TWICE DAILY    Acquired hypothyroidism  Check   - levothyroxine (SYNTHROID/LEVOTHROID) 100 MCG tablet; Take 1 tablet (100 mcg) by mouth daily    Postmenopausal status  Due   - DEXA HIP/PELVIS/SPINE - Future; Future    Status post total replacement of right hip  Needs for prophylaxis   - amoxicillin (AMOXIL) 500 MG tablet; Take 1 tablet (500 mg) by mouth once for 1 dose TAKE 4 TABLETS 1 HOUR BEOFRE DENTAL APPOINTMENT    Patient has been advised of split billing requirements and indicates understanding: Yes    Results for orders placed or performed in visit on 04/18/24   TSH WITH FREE T4 REFLEX     Status: Normal   Result Value Ref Range    TSH 1.57 0.30 - 4.20 uIU/mL             BMI  Estimated body mass index is 28.95 kg/m  as calculated from the following:    Height as of this encounter: 1.632 m (5' 4.25\").    Weight as of this encounter: 77.1 kg (170 lb).       Counseling  Appropriate preventive services were discussed with this patient, including applicable screening as appropriate for fall prevention, nutrition, physical activity, Tobacco-use cessation, weight loss and cognition.  Checklist reviewing preventive services available has been given to the patient.  Reviewed patient's diet, addressing concerns and/or questions.   She is at risk for lack of exercise and has been provided with information to increase physical activity for the benefit of her well-being.   She is at risk for psychosocial distress and has been provided with information to reduce risk.   Discussed possible causes of " fatigue. The patient was provided with written information regarding signs of hearing loss.   Information on urinary incontinence and treatment options given to patient.   The patient's PHQ-9 score is consistent with mild depression. She was provided with information regarding depression.       FUTURE APPOINTMENTS:       - Follow-up for annual visit or as needed    Bonita Singh is a 74 year old, presenting for the following:  Medicare Visit        4/18/2024     2:15 PM   Additional Questions   Roomed by Carmen WOODS CMA         Health Care Directive  Patient does not have a Health Care Directive or Living Will: Discussed advance care planning with patient; however, patient declined at this time.    HPI      She is doing well on her current medications             4/11/2024   General Health   How would you rate your overall physical health? Good   Feel stress (tense, anxious, or unable to sleep) Only a little   (!) STRESS CONCERN      4/11/2024   Nutrition   Diet: Regular (no restrictions)         4/11/2024   Exercise   Days per week of moderate/strenous exercise 3 days   Average minutes spent exercising at this level 10 min         4/11/2024   Social Factors   Frequency of gathering with friends or relatives Once a week   Worry food won't last until get money to buy more No   Food not last or not have enough money for food? No   Do you have housing?  Yes   Are you worried about losing your housing? No   Lack of transportation? No   Unable to get utilities (heat,electricity)? No         4/30/2024   Fall Risk   Reason Gait Speed Test Not Completed Patient declines   Reason for decline not needed          4/11/2024   Activities of Daily Living- Home Safety   Needs help with the following daily activites None of the above   Safety concerns in the home None of the above         4/11/2024   Dental   Dentist two times every year? Yes         4/11/2024   Hearing Screening   Hearing concerns? (!) TROUBLE FOLLOWING  DIALOGUE IN THE THEATHER.         4/11/2024   Driving Risk Screening   Patient/family members have concerns about driving No         4/11/2024   General Alertness/Fatigue Screening   Have you been more tired than usual lately? (!) YES         4/11/2024   Urinary Incontinence Screening   Bothered by leaking urine in past 6 months Yes         4/11/2024   TB Screening   Were you born outside of the [place]? Yes       Today's PHQ-9 Score:       4/17/2024     3:28 PM   PHQ-9 SCORE   PHQ-9 Total Score MyChart 6 (Mild depression)   PHQ-9 Total Score 6         4/11/2024   Substance Use   Alcohol more than 3/day or more than 7/wk Not Applicable   Do you have a current opioid prescription? No   How severe/bad is pain from 1 to 10? 3/10   Do you use any other substances recreationally? No     Social History     Tobacco Use    Smoking status: Never    Smokeless tobacco: Never   Vaping Use    Vaping status: Never Used   Substance Use Topics    Alcohol use: No    Drug use: No          Mammogram Screening - Mammography discussed and declined    ASCVD Risk   The 10-year ASCVD risk score (Farzaneh PEREZ, et al., 2019) is: 11%    Values used to calculate the score:      Age: 74 years      Sex: Female      Is Non- : No      Diabetic: No      Tobacco smoker: No      Systolic Blood Pressure: 102 mmHg      Is BP treated: No      HDL Cholesterol: 75 mg/dL      Total Cholesterol: 276 mg/dL            Reviewed and updated as needed this visit by Provider                    Past Medical History:   Diagnosis Date    Arthritis     Depressive disorder 35 years ago    Thyroid disease 25 years ago     Current providers sharing in care for this patient include:  Patient Care Team:  Rima Agarwal MD as PCP - General (Family Practice)  Rima Agarwal MD as Assigned PCP  Gail Christianson MD as MD (OB/Gyn)  Gali Christianson MD as Assigned OBGYN Provider    The following health maintenance items are reviewed in Epic and  "correct as of today:  Health Maintenance   Topic Date Due    DEXA  Never done    RSV VACCINE (Pregnancy & 60+) (1 - 1-dose 60+ series) Never done    INFLUENZA VACCINE (1) 09/01/2023    COVID-19 Vaccine (3 - 2023-24 season) 09/01/2023    DEPRESSION 12 MO INDEX REPEAT PHQ-9  05/02/2024    OZ ASSESSMENT  10/18/2024    PHQ-9  10/18/2024    MEDICARE ANNUAL WELLNESS VISIT  04/18/2025    TSH W/FREE T4 REFLEX  04/18/2025    ANNUAL REVIEW OF HM ORDERS  04/18/2025    FALL RISK ASSESSMENT  04/18/2025    ADVANCE CARE PLANNING  03/03/2026    GLUCOSE  04/04/2026    LIPID  03/31/2027    DTAP/TDAP/TD IMMUNIZATION (2 - Tdap) 11/21/2027    COLORECTAL CANCER SCREENING  04/25/2032    HEPATITIS C SCREENING  Completed    DEPRESSION ACTION PLAN  Completed    Pneumococcal Vaccine: 65+ Years  Completed    ZOSTER IMMUNIZATION  Completed    IPV IMMUNIZATION  Aged Out    HPV IMMUNIZATION  Aged Out    MENINGITIS IMMUNIZATION  Aged Out    RSV MONOCLONAL ANTIBODY  Aged Out    MAMMO SCREENING  Discontinued         Review of Systems  Constitutional, HEENT, cardiovascular, pulmonary, gi and gu systems are negative, except as otherwise noted.     Objective    Exam  /64 (BP Location: Right arm, Patient Position: Sitting, Cuff Size: Adult Regular)   Pulse 68   Temp 98.1  F (36.7  C) (Tympanic)   Ht 1.632 m (5' 4.25\")   Wt 77.1 kg (170 lb)   SpO2 95%   BMI 28.95 kg/m     Estimated body mass index is 28.95 kg/m  as calculated from the following:    Height as of this encounter: 1.632 m (5' 4.25\").    Weight as of this encounter: 77.1 kg (170 lb).    Physical Exam  GENERAL: alert and no distress  NECK: no adenopathy, no asymmetry, masses, or scars  RESP: lungs clear to auscultation - no rales, rhonchi or wheezes  CV: regular rate and rhythm, normal S1 S2, no S3 or S4, no murmur, click or rub, no peripheral edema  MS: no gross musculoskeletal defects noted, no edema         No data to display                       Signed Electronically by: " Rima Agarwal MD

## 2024-04-21 NOTE — RESULT ENCOUNTER NOTE
Ellie,  Your lab results were normal/stable. Please feel free to my chart or call the office with questions. Rima Agarwal M.D.

## 2024-11-08 ENCOUNTER — TRANSFERRED RECORDS (OUTPATIENT)
Dept: HEALTH INFORMATION MANAGEMENT | Facility: CLINIC | Age: 74
End: 2024-11-08
Payer: COMMERCIAL

## 2024-12-28 DIAGNOSIS — F33.41 RECURRENT MAJOR DEPRESSIVE DISORDER, IN PARTIAL REMISSION (H): ICD-10-CM

## 2025-01-17 DIAGNOSIS — E03.9 ACQUIRED HYPOTHYROIDISM: ICD-10-CM

## 2025-01-19 RX ORDER — LEVOTHYROXINE SODIUM 100 UG/1
100 TABLET ORAL DAILY
Qty: 100 TABLET | Refills: 0 | Status: SHIPPED | OUTPATIENT
Start: 2025-01-19

## 2025-01-19 NOTE — TELEPHONE ENCOUNTER
Please call patient. They are due for an office visit /follow up and possibly labs.  Refilled [id number] you . Rima Agarwal M.D.

## 2025-03-01 DIAGNOSIS — F33.41 RECURRENT MAJOR DEPRESSIVE DISORDER, IN PARTIAL REMISSION: ICD-10-CM

## 2025-03-23 DIAGNOSIS — F33.41 RECURRENT MAJOR DEPRESSIVE DISORDER, IN PARTIAL REMISSION: ICD-10-CM

## 2025-03-24 RX ORDER — BUPROPION HYDROCHLORIDE 100 MG/1
100 TABLET, EXTENDED RELEASE ORAL
Qty: 180 TABLET | Refills: 0 | Status: SHIPPED | OUTPATIENT
Start: 2025-03-24

## 2025-04-30 ENCOUNTER — DOCUMENTATION ONLY (OUTPATIENT)
Dept: FAMILY MEDICINE | Facility: CLINIC | Age: 75
End: 2025-04-30
Payer: COMMERCIAL

## 2025-04-30 DIAGNOSIS — E03.4 HYPOTHYROIDISM DUE TO ACQUIRED ATROPHY OF THYROID: Primary | ICD-10-CM

## 2025-04-30 DIAGNOSIS — Z13.220 SCREENING FOR CHOLESTEROL LEVEL: ICD-10-CM

## 2025-04-30 DIAGNOSIS — F42.2 MIXED OBSESSIONAL THOUGHTS AND ACTS: ICD-10-CM

## 2025-04-30 NOTE — PROGRESS NOTES
Ellie Lewis has an upcoming lab appointment:    Future Appointments   Date Time Provider Department Center   5/12/2025 11:15 AM [facility]   5/12/2025 11:30 AM Mesha Headley APRN CNP [facility]     There is no order available. Please review and place either future orders or HMPO (Review of Health Maintenance Protocol Orders), as appropriate.    Health Maintenance Due   Topic    ANNUAL REVIEW OF HM ORDERS     TSH W/FREE T4 REFLEX      Xiomy Childs

## 2025-05-03 DIAGNOSIS — F33.41 RECURRENT MAJOR DEPRESSIVE DISORDER, IN PARTIAL REMISSION: ICD-10-CM

## 2025-05-03 DIAGNOSIS — E03.9 ACQUIRED HYPOTHYROIDISM: ICD-10-CM

## 2025-05-05 RX ORDER — LEVOTHYROXINE SODIUM 100 UG/1
100 TABLET ORAL DAILY
Qty: 100 TABLET | Refills: 2 | Status: SHIPPED | OUTPATIENT
Start: 2025-05-05

## 2025-05-07 SDOH — HEALTH STABILITY: PHYSICAL HEALTH: ON AVERAGE, HOW MANY MINUTES DO YOU ENGAGE IN EXERCISE AT THIS LEVEL?: 10 MIN

## 2025-05-07 SDOH — HEALTH STABILITY: PHYSICAL HEALTH: ON AVERAGE, HOW MANY DAYS PER WEEK DO YOU ENGAGE IN MODERATE TO STRENUOUS EXERCISE (LIKE A BRISK WALK)?: 2 DAYS

## 2025-05-07 ASSESSMENT — SOCIAL DETERMINANTS OF HEALTH (SDOH): HOW OFTEN DO YOU GET TOGETHER WITH FRIENDS OR RELATIVES?: ONCE A WEEK

## 2025-05-12 ENCOUNTER — RESULTS FOLLOW-UP (OUTPATIENT)
Dept: FAMILY MEDICINE | Facility: CLINIC | Age: 75
End: 2025-05-12

## 2025-05-12 ENCOUNTER — OFFICE VISIT (OUTPATIENT)
Dept: FAMILY MEDICINE | Facility: CLINIC | Age: 75
End: 2025-05-12
Payer: COMMERCIAL

## 2025-05-12 VITALS
SYSTOLIC BLOOD PRESSURE: 120 MMHG | BODY MASS INDEX: 28.66 KG/M2 | RESPIRATION RATE: 16 BRPM | DIASTOLIC BLOOD PRESSURE: 68 MMHG | WEIGHT: 172 LBS | TEMPERATURE: 98 F | HEIGHT: 65 IN | OXYGEN SATURATION: 96 % | HEART RATE: 62 BPM

## 2025-05-12 DIAGNOSIS — Z13.220 LIPID SCREENING: ICD-10-CM

## 2025-05-12 DIAGNOSIS — E03.4 HYPOTHYROIDISM DUE TO ACQUIRED ATROPHY OF THYROID: ICD-10-CM

## 2025-05-12 DIAGNOSIS — Z00.00 ENCOUNTER FOR MEDICARE ANNUAL WELLNESS EXAM: Primary | ICD-10-CM

## 2025-05-12 DIAGNOSIS — Z78.0 ASYMPTOMATIC POSTMENOPAUSAL STATUS: ICD-10-CM

## 2025-05-12 DIAGNOSIS — Z23 NEED FOR VACCINATION: ICD-10-CM

## 2025-05-12 DIAGNOSIS — E03.9 ACQUIRED HYPOTHYROIDISM: ICD-10-CM

## 2025-05-12 DIAGNOSIS — E78.2 MIXED HYPERLIPIDEMIA: Primary | ICD-10-CM

## 2025-05-12 DIAGNOSIS — F33.41 RECURRENT MAJOR DEPRESSIVE DISORDER, IN PARTIAL REMISSION: ICD-10-CM

## 2025-05-12 LAB
ANION GAP SERPL CALCULATED.3IONS-SCNC: 9 MMOL/L (ref 7–15)
BUN SERPL-MCNC: 14.7 MG/DL (ref 8–23)
CALCIUM SERPL-MCNC: 9.7 MG/DL (ref 8.8–10.4)
CHLORIDE SERPL-SCNC: 101 MMOL/L (ref 98–107)
CHOLEST SERPL-MCNC: 283 MG/DL
CREAT SERPL-MCNC: 0.93 MG/DL (ref 0.51–0.95)
EGFRCR SERPLBLD CKD-EPI 2021: 63 ML/MIN/1.73M2
FASTING STATUS PATIENT QL REPORTED: NO
FASTING STATUS PATIENT QL REPORTED: NO
GLUCOSE SERPL-MCNC: 85 MG/DL (ref 70–99)
HCO3 SERPL-SCNC: 28 MMOL/L (ref 22–29)
HDLC SERPL-MCNC: 75 MG/DL
LDLC SERPL CALC-MCNC: 186 MG/DL
NONHDLC SERPL-MCNC: 208 MG/DL
POTASSIUM SERPL-SCNC: 4.6 MMOL/L (ref 3.4–5.3)
SODIUM SERPL-SCNC: 138 MMOL/L (ref 135–145)
TRIGL SERPL-MCNC: 108 MG/DL
TSH SERPL DL<=0.005 MIU/L-ACNC: 1.21 UIU/ML (ref 0.3–4.2)

## 2025-05-12 PROCEDURE — G0439 PPPS, SUBSEQ VISIT: HCPCS

## 2025-05-12 PROCEDURE — 80048 BASIC METABOLIC PNL TOTAL CA: CPT

## 2025-05-12 PROCEDURE — 3078F DIAST BP <80 MM HG: CPT

## 2025-05-12 PROCEDURE — G2211 COMPLEX E/M VISIT ADD ON: HCPCS

## 2025-05-12 PROCEDURE — 80061 LIPID PANEL: CPT

## 2025-05-12 PROCEDURE — 3074F SYST BP LT 130 MM HG: CPT

## 2025-05-12 PROCEDURE — 1126F AMNT PAIN NOTED NONE PRSNT: CPT

## 2025-05-12 PROCEDURE — 84443 ASSAY THYROID STIM HORMONE: CPT

## 2025-05-12 PROCEDURE — 99213 OFFICE O/P EST LOW 20 MIN: CPT | Mod: 25

## 2025-05-12 PROCEDURE — 36415 COLL VENOUS BLD VENIPUNCTURE: CPT

## 2025-05-12 RX ORDER — LEVOTHYROXINE SODIUM 100 UG/1
100 TABLET ORAL DAILY
Qty: 90 TABLET | Refills: 3 | Status: SHIPPED | OUTPATIENT
Start: 2025-05-12

## 2025-05-12 RX ORDER — BUPROPION HYDROCHLORIDE 100 MG/1
100 TABLET, EXTENDED RELEASE ORAL
Qty: 180 TABLET | Refills: 3 | Status: SHIPPED | OUTPATIENT
Start: 2025-05-12

## 2025-05-12 RX ORDER — ATORVASTATIN CALCIUM 10 MG/1
10 TABLET, FILM COATED ORAL DAILY
Qty: 90 TABLET | Refills: 0 | Status: SHIPPED | OUTPATIENT
Start: 2025-05-12 | End: 2025-07-14

## 2025-05-12 RX ORDER — LEVOTHYROXINE SODIUM 100 UG/1
100 TABLET ORAL DAILY
Qty: 100 TABLET | Refills: 2 | Status: SHIPPED | OUTPATIENT
Start: 2025-05-12 | End: 2025-05-12

## 2025-05-12 ASSESSMENT — ANXIETY QUESTIONNAIRES
4. TROUBLE RELAXING: NOT AT ALL
7. FEELING AFRAID AS IF SOMETHING AWFUL MIGHT HAPPEN: NOT AT ALL
IF YOU CHECKED OFF ANY PROBLEMS ON THIS QUESTIONNAIRE, HOW DIFFICULT HAVE THESE PROBLEMS MADE IT FOR YOU TO DO YOUR WORK, TAKE CARE OF THINGS AT HOME, OR GET ALONG WITH OTHER PEOPLE: SOMEWHAT DIFFICULT
1. FEELING NERVOUS, ANXIOUS, OR ON EDGE: SEVERAL DAYS
8. IF YOU CHECKED OFF ANY PROBLEMS, HOW DIFFICULT HAVE THESE MADE IT FOR YOU TO DO YOUR WORK, TAKE CARE OF THINGS AT HOME, OR GET ALONG WITH OTHER PEOPLE?: SOMEWHAT DIFFICULT
GAD7 TOTAL SCORE: 4
3. WORRYING TOO MUCH ABOUT DIFFERENT THINGS: SEVERAL DAYS
GAD7 TOTAL SCORE: 4
2. NOT BEING ABLE TO STOP OR CONTROL WORRYING: SEVERAL DAYS
7. FEELING AFRAID AS IF SOMETHING AWFUL MIGHT HAPPEN: NOT AT ALL
GAD7 TOTAL SCORE: 4
6. BECOMING EASILY ANNOYED OR IRRITABLE: SEVERAL DAYS
5. BEING SO RESTLESS THAT IT IS HARD TO SIT STILL: NOT AT ALL

## 2025-05-12 ASSESSMENT — PATIENT HEALTH QUESTIONNAIRE - PHQ9
10. IF YOU CHECKED OFF ANY PROBLEMS, HOW DIFFICULT HAVE THESE PROBLEMS MADE IT FOR YOU TO DO YOUR WORK, TAKE CARE OF THINGS AT HOME, OR GET ALONG WITH OTHER PEOPLE: SOMEWHAT DIFFICULT
SUM OF ALL RESPONSES TO PHQ QUESTIONS 1-9: 6
SUM OF ALL RESPONSES TO PHQ QUESTIONS 1-9: 6

## 2025-05-12 ASSESSMENT — PAIN SCALES - GENERAL: PAINLEVEL_OUTOF10: NO PAIN (0)

## 2025-05-12 NOTE — PROGRESS NOTES
"Preventive Care Visit  [facility] MARIANN Howell CNP, Family Medicine  May 12, 2025      Assessment & Plan     Encounter for Medicare annual wellness exam  Declines covid vaccine. Discussed diet/exercise and calcium intake 1200 mg  - Basic metabolic panel  (Ca, Cl, CO2, Creat, Gluc, K, Na, BUN)  - Basic metabolic panel  (Ca, Cl, CO2, Creat, Gluc, K, Na, BUN)    Need for vaccination  Will do rsv at pharmacy    Asymptomatic postmenopausal status  Dexa ordered  - DEXA HIP/PELVIS/SPINE - Future    Hypothyroidism due to acquired atrophy of thyroid  Check labs today, refills provided  - TSH with free T4 reflex  - TSH with free T4 reflex  - levothyroxine (SYNTHROID/LEVOTHROID) 100 MCG tablet  Dispense: 90 tablet; Refill: 3    Lipid screening  Labs today  - Lipid panel reflex to direct LDL Fasting  - Lipid panel reflex to direct LDL Fasting    Recurrent major depressive disorder, in partial remission  Well controlled, refills provided  - buPROPion (WELLBUTRIN SR) 100 MG 12 hr tablet  Dispense: 180 tablet; Refill: 3  - FLUoxetine (PROZAC) 20 MG capsule  Dispense: 270 capsule; Refill: 3            BMI  Estimated body mass index is 29.07 kg/m  as calculated from the following:    Height as of this encounter: 1.638 m (5' 4.5\").    Weight as of this encounter: 78 kg (172 lb).       Counseling  Appropriate preventive services were addressed with this patient via screening, questionnaire, or discussion as appropriate for fall prevention, nutrition, physical activity, Tobacco-use cessation, social engagement, weight loss and cognition.  Checklist reviewing preventive services available has been given to the patient.  Reviewed patient's diet, addressing concerns and/or questions.   She is at risk for lack of exercise and has been provided with information to increase physical activity for the benefit of her well-being.   Patient reported safety concerns were addressed today.The patient was provided with written " information regarding signs of hearing loss.   Information on urinary incontinence and treatment options given to patient.   The patient's PHQ-9 score is consistent with mild depression. She was provided with information regarding depression.     The longitudinal plan of care for the diagnosis(es)/condition(s) as documented were addressed during this visit. Due to the added complexity in care, I will continue to support Francisco in the subsequent management and with ongoing continuity of care.    Bonita Singh is a 75 year old, presenting for the following:  Wellness Visit            HPI      Diet: well balanced. Gets enough calcium  Exercise: no  Alcohol: never  Nicotine: none  Drugs: none        Answers submitted by the patient for this visit:  Patient Health Questionnaire (Submitted on 5/12/2025)  If you checked off any problems, how difficult have these problems made it for you to do your work, take care of things at home, or get along with other people?: Somewhat difficult  PHQ9 TOTAL SCORE: 6  Patient Health Questionnaire (G7) (Submitted on 5/12/2025)  OZ 7 TOTAL SCORE: 4    Mood has been stable          Advance Care Planning    Discussed advance care planning with patient; however, patient declined at this time.        5/7/2025   General Health   How would you rate your overall physical health? (!) FAIR   Feel stress (tense, anxious, or unable to sleep) Only a little   (!) STRESS CONCERN      5/7/2025   Nutrition   Diet: Regular (no restrictions)         5/7/2025   Exercise   Days per week of moderate/strenous exercise 2 days   Average minutes spent exercising at this level 10 min   (!) EXERCISE CONCERN      5/7/2025   Social Factors   Frequency of gathering with friends or relatives Once a week   Worry food won't last until get money to buy more No   Food not last or not have enough money for food? No   Do you have housing? (Housing is defined as stable permanent housing and does not include staying  outside in a car, in a tent, in an abandoned building, in an overnight shelter, or couch-surfing.) Yes   Are you worried about losing your housing? No   Lack of transportation? No   Unable to get utilities (heat,electricity)? No         5/7/2025   Fall Risk   Fallen 2 or more times in the past year? No   Trouble with walking or balance? Yes           5/7/2025   Activities of Daily Living- Home Safety   Needs help with the following daily activites None of the above   Safety concerns in the home Poor lighting         5/7/2025   Dental   Dentist two times every year? Yes         5/7/2025   Hearing Screening   Hearing concerns? (!) I NEED TO ASK PEOPLE TO SPEAK UP OR REPEAT THEMSELVES.    (!) TROUBLE FOLLOWING DIALOGUE IN THE THEATHER.       Multiple values from one day are sorted in reverse-chronological order         5/7/2025   Driving Risk Screening   Patient/family members have concerns about driving No         5/7/2025   General Alertness/Fatigue Screening   Have you been more tired than usual lately? No         5/7/2025   Urinary Incontinence Screening   Bothered by leaking urine in past 6 months Yes       Today's PHQ-9 Score:       5/12/2025    10:49 AM   PHQ-9 SCORE   PHQ-9 Total Score MyChart 6 (Mild depression)   PHQ-9 Total Score 6        Patient-reported         5/7/2025   Substance Use   Alcohol more than 3/day or more than 7/wk Not Applicable   Do you have a current opioid prescription? No   How severe/bad is pain from 1 to 10? 5/10   Do you use any other substances recreationally? No     Social History     Tobacco Use    Smoking status: Never    Smokeless tobacco: Never   Vaping Use    Vaping status: Never Used   Substance Use Topics    Alcohol use: No    Drug use: No          Mammogram Screening - After age 74- determine frequency with patient based on health status, life expectancy and patient goals    ASCVD Risk   The ASCVD Risk score (Farzaneh DK, et al., 2019) failed to calculate for the  "following reasons:    The systolic blood pressure is missing        4/17/2024     3:28 PM 4/22/2025    11:36 AM 5/12/2025    10:49 AM   PHQ   PHQ-9 Total Score 6 8  6    Q9: Thoughts of better off dead/self-harm past 2 weeks Not at all Not at all Not at all       Patient-reported         10/18/2023     4:15 PM 4/18/2024     2:14 PM 5/12/2025    10:50 AM   OZ-7 SCORE   Total Score  3 (minimal anxiety) 4 (minimal anxiety)   Total Score 2 3 4        Patient-reported               Reviewed and updated as needed this visit by Provider                    Current providers sharing in care for this patient include:  Patient Care Team:  Rima Agarwal MD as PCP - General (Family Practice)  Rima Agarwal MD as Assigned PCP  Gali Christianson MD as MD (OB/Gyn)    The following health maintenance items are reviewed in Epic and correct as of today:  Health Maintenance   Topic Date Due    DEXA  Never done    RSV VACCINE (1 - 1-dose 75+ series) Never done    COVID-19 Vaccine (3 - 2024-25 season) 09/01/2024    ANNUAL REVIEW OF HM ORDERS  04/18/2025    MEDICARE ANNUAL WELLNESS VISIT  04/18/2025    TSH W/FREE T4 REFLEX  04/18/2025    INFLUENZA VACCINE (Season Ended) 09/01/2025    OZ ASSESSMENT  11/12/2025    PHQ-9  11/12/2025    DIABETES SCREENING  04/04/2026    FALL RISK ASSESSMENT  05/12/2026    LIPID  03/31/2027    DTAP/TDAP/TD IMMUNIZATION (2 - Tdap) 11/21/2027    ADVANCE CARE PLANNING  04/18/2029    HEPATITIS C SCREENING  Completed    DEPRESSION ACTION PLAN  Completed    Pneumococcal Vaccine: 50+ Years  Completed    ZOSTER IMMUNIZATION  Completed    HPV IMMUNIZATION  Aged Out    MENINGITIS IMMUNIZATION  Aged Out    MAMMO SCREENING  Discontinued    COLORECTAL CANCER SCREENING  Discontinued            Objective    Exam  /68 (BP Location: Right arm, Patient Position: Sitting, Cuff Size: Adult Regular)   Pulse 62   Temp 98  F (36.7  C) (Tympanic)   Resp 16   Ht 1.638 m (5' 4.5\")   Wt 78 kg (172 lb)   SpO2 96%   " "BMI 29.07 kg/m     Estimated body mass index is 29.07 kg/m  as calculated from the following:    Height as of this encounter: 1.638 m (5' 4.5\").    Weight as of this encounter: 78 kg (172 lb).    Physical Exam  GENERAL: alert and no distress  EYES: Eyes grossly normal to inspection, PERRL and conjunctivae and sclerae normal  HENT: ear canals and TM's normal, nose and mouth without ulcers or lesions  NECK: no adenopathy, no asymmetry, masses, or scars  RESP: lungs clear to auscultation - no rales, rhonchi or wheezes  CV: regular rate and rhythm, normal S1 S2, no S3 or S4, no murmur, click or rub, no peripheral edema  ABDOMEN: soft, nontender, no hepatosplenomegaly, no masses and bowel sounds normal  MS: no gross musculoskeletal defects noted, no edema  SKIN: no suspicious lesions or rashes  NEURO: Normal strength and tone, mentation intact and speech normal  PSYCH: mentation appears normal, affect normal/bright        4/30/2024   Mini Cog   Clock Draw Score 2 Normal   3 Item Recall 3 objects recalled   Mini Cog Total Score 5              Signed Electronically by: MARIANN Fajardo CNP    "

## 2025-05-13 ENCOUNTER — PATIENT OUTREACH (OUTPATIENT)
Dept: CARE COORDINATION | Facility: CLINIC | Age: 75
End: 2025-05-13
Payer: COMMERCIAL

## 2025-07-13 DIAGNOSIS — E78.2 MIXED HYPERLIPIDEMIA: ICD-10-CM

## 2025-07-14 RX ORDER — ATORVASTATIN CALCIUM 10 MG/1
10 TABLET, FILM COATED ORAL DAILY
Qty: 90 TABLET | Refills: 2 | Status: SHIPPED | OUTPATIENT
Start: 2025-07-14